# Patient Record
Sex: FEMALE | Race: WHITE | NOT HISPANIC OR LATINO | Employment: PART TIME | URBAN - METROPOLITAN AREA
[De-identification: names, ages, dates, MRNs, and addresses within clinical notes are randomized per-mention and may not be internally consistent; named-entity substitution may affect disease eponyms.]

---

## 2017-02-22 ENCOUNTER — NON-PROVIDER VISIT (OUTPATIENT)
Dept: URGENT CARE | Facility: PHYSICIAN GROUP | Age: 77
End: 2017-02-22

## 2017-02-22 DIAGNOSIS — Z11.1 PPD SCREENING TEST: ICD-10-CM

## 2017-02-22 PROCEDURE — 86580 TB INTRADERMAL TEST: CPT | Performed by: FAMILY MEDICINE

## 2017-02-22 NOTE — NON-PROVIDER
Elle Barksdale is a 77 y.o. female here for a non-provider visit for PPD placement -- Step 1 of 2    Reason for PPD:  work requirement    TB evaluation questionnaire completed? Yes   If any answers marked yes did you contact a provider prior to placing? Not Indicated  Pt notified to return to clinic for reading on: 2/24/17 or 2/25/17  Tuberculosis evaluation questionnaire documentation completed? Yes  LocationTuberculosis evaluation questionnaire filed: yes

## 2017-02-22 NOTE — MR AVS SNAPSHOT
Elle Barksdale   2017 1:30 PM   Non-Provider Visit   MRN: [id number]    Department:  [facility]   Dept Phone:  [phone]    Description:  Female : 1940   Provider:  [facility]           Reason for Visit     PPD Placement PPd placement      Allergies as of 2017     No Known Allergies      You were diagnosed with     PPD screening test   [[id number]]         Vital Signs     Smoking Status                   Former Smoker           Basic Information     Date Of Birth Sex Race Ethnicity Preferred Language    1940 Female White Non- English      Problem List              ICD-10-CM Priority Class Noted - Resolved    Right knee injury S89.91XA   2013 - Present    Family history of colon cancer Z80.0   2013 - Present    Routine adult health maintenance Z00.00   2013 - Present      Health Maintenance        Date Due Completion Dates    IMM DTaP/Tdap/Td Vaccine (1 - Tdap) 1959 ---    PAP SMEAR 1961 ---    MAMMOGRAM 1980 ---    COLONOSCOPY 1990 ---    IMM ZOSTER VACCINE 2000 ---    BONE DENSITY 2005 ---    IMM PNEUMOCOCCAL 65+ (ADULT) LOW/MEDIUM RISK SERIES (1 of 2 - PCV13) 2005 ---    IMM INFLUENZA (1) 2016 ---            Current Immunizations     Tuberculin Skin Test 2017      Below and/or attached are the medications your provider expects you to take. Review all of your home medications and newly ordered medications with your provider and/or pharmacist. Follow medication instructions as directed by your provider and/or pharmacist. Please keep your medication list with you and share with your provider. Update the information when medications are discontinued, doses are changed, or new medications (including over-the-counter products) are added; and carry medication information at all times in the event of emergency situations     Allergies:  No Known Allergies          Medications  Valid as of: 2017  -  2:33 PM    Generic Name Brand Name Tablet Size Instructions for use    .                 Medicines prescribed today were sent to:     None      Medication refill instructions:       If your prescription bottle indicates you have medication refills left, it is not necessary to call your provider’s office. Please contact your pharmacy and they will refill your medication.    If your prescription bottle indicates you do not have any refills left, you may request refills at any time through one of the following ways: The online [employer] system (except Urgent Care), by calling your provider’s office, or by asking your pharmacy to contact your provider’s office with a refill request. Medication refills are processed only during regular business hours and may not be available until the next business day. Your provider may request additional information or to have a follow-up visit with you prior to refilling your medication.   *Please Note: Medication refills are assigned a new Rx number when refilled electronically. Your pharmacy may indicate that no refills were authorized even though a new prescription for the same medication is available at the pharmacy. Please request the medicine by name with the pharmacy before contacting your provider for a refill.           [employer] Status: Patient Declined

## 2017-02-24 ENCOUNTER — NON-PROVIDER VISIT (OUTPATIENT)
Dept: URGENT CARE | Facility: PHYSICIAN GROUP | Age: 77
End: 2017-02-24

## 2017-02-24 LAB — TB WHEAL 3D P 5 TU DIAM: NORMAL MM

## 2017-02-24 NOTE — MR AVS SNAPSHOT
Elle Barksdale   2017 2:00 PM   Non-Provider Visit   MRN: [id number]    Department:  [facility]   Dept Phone:  [phone]    Description:  Female : 1940   Provider:  [facility]           Reason for Visit     PPD Reading           Allergies as of 2017     No Known Allergies      Vital Signs     Smoking Status                   Former Smoker           Basic Information     Date Of Birth Sex Race Ethnicity Preferred Language    1940 Female White Non- English      Problem List              ICD-10-CM Priority Class Noted - Resolved    Right knee injury S89.91XA   2013 - Present    Family history of colon cancer Z80.0   2013 - Present    Routine adult health maintenance Z00.00   2013 - Present      Health Maintenance        Date Due Completion Dates    IMM DTaP/Tdap/Td Vaccine (1 - Tdap) 1959 ---    PAP SMEAR 1961 ---    MAMMOGRAM 1980 ---    COLONOSCOPY 1990 ---    IMM ZOSTER VACCINE 2000 ---    BONE DENSITY 2005 ---    IMM PNEUMOCOCCAL 65+ (ADULT) LOW/MEDIUM RISK SERIES (1 of 2 - PCV13) 2005 ---    IMM INFLUENZA (1) 2016 ---            Current Immunizations     Tuberculin Skin Test 2017      Below and/or attached are the medications your provider expects you to take. Review all of your home medications and newly ordered medications with your provider and/or pharmacist. Follow medication instructions as directed by your provider and/or pharmacist. Please keep your medication list with you and share with your provider. Update the information when medications are discontinued, doses are changed, or new medications (including over-the-counter products) are added; and carry medication information at all times in the event of emergency situations     Allergies:  No Known Allergies          Medications  Valid as of: 2017 -  2:14 PM    Generic Name Brand Name Tablet Size Instructions for use    .                  Medicines prescribed today were sent to:     None      Medication refill instructions:       If your prescription bottle indicates you have medication refills left, it is not necessary to call your provider’s office. Please contact your pharmacy and they will refill your medication.    If your prescription bottle indicates you do not have any refills left, you may request refills at any time through one of the following ways: The online [employer] system (except Urgent Care), by calling your provider’s office, or by asking your pharmacy to contact your provider’s office with a refill request. Medication refills are processed only during regular business hours and may not be available until the next business day. Your provider may request additional information or to have a follow-up visit with you prior to refilling your medication.   *Please Note: Medication refills are assigned a new Rx number when refilled electronically. Your pharmacy may indicate that no refills were authorized even though a new prescription for the same medication is available at the pharmacy. Please request the medicine by name with the pharmacy before contacting your provider for a refill.           MyChart Status: Patient Declined

## 2017-02-24 NOTE — PROGRESS NOTES
Elle Barksdale is a 77 y.o. female here for a non-provider visit for PPD reading -- Step 1 of 2.      Resulted in Epic under original non-provider visit? Yes   TB evaluation questionnaire scanned into chart and original given to pt?No, patient needs two step. Copy given to patient, original packet up front in folder.   If greater than 0 mm, result verified by  (indicate provider who verified) NA    If Step 1 of 2, when is pt returning for second step (delete if N/A): WED 3/1/17    Routed to PCP? No

## 2017-03-01 ENCOUNTER — NON-PROVIDER VISIT (OUTPATIENT)
Dept: URGENT CARE | Facility: PHYSICIAN GROUP | Age: 77
End: 2017-03-01

## 2017-03-01 DIAGNOSIS — Z11.1 PPD SCREENING TEST: ICD-10-CM

## 2017-03-01 PROCEDURE — 86580 TB INTRADERMAL TEST: CPT | Performed by: FAMILY MEDICINE

## 2017-03-04 ENCOUNTER — APPOINTMENT (OUTPATIENT)
Dept: URGENT CARE | Facility: PHYSICIAN GROUP | Age: 77
End: 2017-03-04
Payer: MEDICARE

## 2017-10-26 ENCOUNTER — OFFICE VISIT (OUTPATIENT)
Dept: MEDICAL GROUP | Facility: PHYSICIAN GROUP | Age: 77
End: 2017-10-26
Payer: MEDICARE

## 2017-10-26 ENCOUNTER — HOSPITAL ENCOUNTER (OUTPATIENT)
Dept: RADIOLOGY | Facility: MEDICAL CENTER | Age: 77
End: 2017-10-26
Attending: INTERNAL MEDICINE
Payer: MEDICARE

## 2017-10-26 VITALS
TEMPERATURE: 98.6 F | OXYGEN SATURATION: 97 % | BODY MASS INDEX: 29.77 KG/M2 | SYSTOLIC BLOOD PRESSURE: 114 MMHG | RESPIRATION RATE: 12 BRPM | DIASTOLIC BLOOD PRESSURE: 80 MMHG | HEIGHT: 63 IN | WEIGHT: 168 LBS | HEART RATE: 80 BPM

## 2017-10-26 DIAGNOSIS — M25.562 ACUTE PAIN OF LEFT KNEE: ICD-10-CM

## 2017-10-26 DIAGNOSIS — Z00.00 HEALTHCARE MAINTENANCE: ICD-10-CM

## 2017-10-26 DIAGNOSIS — Z23 NEED FOR VACCINATION WITH 13-POLYVALENT PNEUMOCOCCAL CONJUGATE VACCINE: ICD-10-CM

## 2017-10-26 DIAGNOSIS — M79.89 LEG SWELLING: ICD-10-CM

## 2017-10-26 PROBLEM — M25.569 KNEE PAIN: Status: ACTIVE | Noted: 2017-10-26

## 2017-10-26 PROBLEM — M25.569 KNEE PAIN: Status: RESOLVED | Noted: 2017-10-26 | Resolved: 2017-10-26

## 2017-10-26 PROCEDURE — 73562 X-RAY EXAM OF KNEE 3: CPT | Mod: LT

## 2017-10-26 PROCEDURE — 99204 OFFICE O/P NEW MOD 45 MIN: CPT | Performed by: INTERNAL MEDICINE

## 2017-10-26 PROCEDURE — 90670 PCV13 VACCINE IM: CPT | Performed by: INTERNAL MEDICINE

## 2017-10-26 PROCEDURE — G0009 ADMIN PNEUMOCOCCAL VACCINE: HCPCS | Performed by: INTERNAL MEDICINE

## 2017-10-26 ASSESSMENT — PATIENT HEALTH QUESTIONNAIRE - PHQ9: CLINICAL INTERPRETATION OF PHQ2 SCORE: 0

## 2017-10-26 NOTE — ASSESSMENT & PLAN NOTE
Has been having intermittent left knee pain for the past several months increasing in frequency. Pain is a burning sensation and improves with advil. Climbing stairs make it harder. Denies trauma. Denies tingling/numbness of left lower extremity. Exacerbated by sitting a lot, like plane travel. Likes to travel. Didn't have as much trouble last year on her trip to [place] and with walking but wouldn't be able to attempt such a trip this year.

## 2017-10-26 NOTE — ASSESSMENT & PLAN NOTE
Has bilateral pedal edema in the summers usually but this year it's persisting. Usually elevating her feet helps but that isn't helping as much anymore. In the evenings she will sit on the couch and rest her feet on an ottoman or couch. Notes leg swelling even in the mornings now. Denies chest pain, shortness of breath, palpitations, frothy urine

## 2017-10-26 NOTE — PROGRESS NOTES
PRIMARY CARE CLINIC NEW PATIENT H&P  Chief Complaint   Patient presents with   • Knee Pain     L knee xmonths   • Leg Swelling     with ankle swelling      History of Present Illness     Acute pain of left knee  Has been having intermittent left knee pain for the past several months increasing in frequency. Pain is a burning sensation and improves with advil. Climbing stairs make it harder. Denies trauma. Denies tingling/numbness of left lower extremity. Exacerbated by sitting a lot, like plane travel. Likes to travel. Didn't have as much trouble last year on her trip to [place] and with walking but wouldn't be able to attempt such a trip this year.     Leg swelling  Has bilateral pedal edema in the summers usually but this year it's persisting. Usually elevating her feet helps but that isn't helping as much anymore. In the evenings she will sit on the couch and rest her feet on an ottoman or couch. Notes leg swelling even in the mornings now. Denies chest pain, shortness of breath, palpitations, frothy urine    No current outpatient prescriptions on file.     No current facility-administered medications for this visit.        Past Medical History:   Diagnosis Date   • Acute pain of left knee 10/26/2017   • Leg swelling 10/26/2017   • Right knee injury 4/4/2013     Past Surgical History:   Procedure Laterality Date   • CATARACT PHACO WITH IOL  1/13/2014    Performed by Samuel Vega M.D. at [facility]   • CATARACT PHACO WITH IOL  12/16/2013    Performed by Samuel Vega M.D. at [facility]   • APPENDECTOMY     • TONSILLECTOMY       Social History   Substance Use Topics   • Smoking status: Former Smoker     Packs/day: 1.00     Years: 30.00     Types: Cigarettes     Quit date: 1990   • Smokeless tobacco: Never Used   • Alcohol use Yes      Comment: rarely; 2 glasses of wine/month      Social History     Social History Narrative    Caregiver for clients at [employer]  "History   Problem Relation Age of Onset   • Hypertension Mother    • Cancer Father      colon   • No Known Problems Brother    • Diabetes Neg Hx    • Heart Disease Neg Hx    • Stroke Neg Hx      Family Status   Relation Status   • Mother    • Father    • Brother Alive, age 76y   • Neg Hx      Allergies: Review of patient's allergies indicates no known allergies.    ROS  Constitutional: Negative for fatigue/generalized weakness.   HEENT: Negative for  vision changes, hearing changes    Respiratory: Negative for shortness of breath  Cardiovascular: Negative for chest pain, palpitations  Gastrointestinal: Negative for blood in stool, constipation, diarrhea  Genitourinary: Negative for dysuria, polyuria  Musculoskeletal: positive for left knee pain and bilateral ankle edema   Skin: Negative for rash  Neurological: Negative for numbness, tingling  Psychiatric/Behavioral: Negative for depression, suicidal/homicidal ideation      Objective   Blood pressure 114/80, pulse 80, temperature 37 °C (98.6 °F), resp. rate 12, height 1.6 m (5' 3\"), weight 76.2 kg (168 lb), SpO2 97 %. Body mass index is 29.76 kg/m².    General: Alert, oriented. In no acute distress   HEET: EOMI, PERRL, conjunctiva non-injected, sclera non-icteric.  Nares patent with no significant congestion or drainage.  Cynthia pinnae, external auditory canals, TM pearly gray with normal light reflex bilaterally.Oral mucous membranes pink and moist with no lesions.  Neck: supple with no cervical, subclavicular lymphadenopathy, JVD, palpable thyroid nodules   Lungs: clear to auscultation bilaterally with good excursion.  CV: regular rate and rhythm.  Abdomen soft, non-distended, non-tender with normal bowel sounds. No hepatosplenomegaly, no masses palpated  Skin: no lesions. Warm, dry   Psychiatric: appropriate mood and affect   MSK: crepitus of bilateral knees. Mild swelling of lateral aspect of left knee. Left knee with full extension and flexion. "   Extremities: bilateral non-pitting ankle edema    Assessment and Plan   The following treatment plan was discussed     1. Acute pain of left knee  Most likely OA given symptoms worse with ambulation, improve with NSAIDs. Will get imaging and then most likely plan for possible cortisone injection to avoid systemic side effects of NSAIDs. Also provided a handout on quadriceps strengthening exercises.   - DX-KNEE 3 VIEWS LEFT; Future    2. Leg swelling  Unlike cardiac or renal etiology given negative ROS however will check basic labs. Recommended she elevate legs above level of heart especially while sleeping. Hopefully that will reduce edema enough for her to be able to fit compression stockings on during the day while she's working.     3. Need for vaccination with 13-polyvalent pneumococcal conjugate vaccine  Given today, plan for Pneumovax in 12 months.     4. Healthcare maintenance  Obtain fasting labs. Will call patient with results.   - COMP METABOLIC PANEL; Future  - CBC WITH DIFFERENTIAL; Future  - VITAMIN D,25 HYDROXY; Future  - LIPID PROFILE; Future      Return in about 6 months (around 4/26/2018) for annual wellness .    Health Maintenance      Health Maintenance Due   Topic Date Due   • Annual Wellness Visit  1940   • IMM DTaP/Tdap/Td Vaccine (1 - Tdap) 01/19/1959   • PAP SMEAR  01/19/1961   • MAMMOGRAM  01/19/1980   • IMM ZOSTER VACCINE  01/19/2000   • IMM PNEUMOCOCCAL 65+ (ADULT) LOW/MEDIUM RISK SERIES (1 of 2 - PCV13) 01/19/2005       Laron Gaming MD  Internal Medicine  [facility]

## 2017-10-30 ENCOUNTER — HOSPITAL ENCOUNTER (OUTPATIENT)
Dept: LAB | Facility: MEDICAL CENTER | Age: 77
End: 2017-10-30
Attending: INTERNAL MEDICINE
Payer: MEDICARE

## 2017-10-30 DIAGNOSIS — Z00.00 HEALTHCARE MAINTENANCE: ICD-10-CM

## 2017-10-30 LAB
25(OH)D3 SERPL-MCNC: 14 NG/ML (ref 30–100)
ALBUMIN SERPL BCP-MCNC: 3.8 G/DL (ref 3.2–4.9)
ALBUMIN/GLOB SERPL: 1.5 G/DL
ALP SERPL-CCNC: 61 U/L (ref 30–99)
ALT SERPL-CCNC: 9 U/L (ref 2–50)
ANION GAP SERPL CALC-SCNC: 6 MMOL/L (ref 0–11.9)
AST SERPL-CCNC: 17 U/L (ref 12–45)
BASOPHILS # BLD AUTO: 0.9 % (ref 0–1.8)
BASOPHILS # BLD: 0.05 K/UL (ref 0–0.12)
BILIRUB SERPL-MCNC: 0.8 MG/DL (ref 0.1–1.5)
BUN SERPL-MCNC: 21 MG/DL (ref 8–22)
CALCIUM SERPL-MCNC: 9.7 MG/DL (ref 8.5–10.5)
CHLORIDE SERPL-SCNC: 107 MMOL/L (ref 96–112)
CHOLEST SERPL-MCNC: 202 MG/DL (ref 100–199)
CO2 SERPL-SCNC: 27 MMOL/L (ref 20–33)
CREAT SERPL-MCNC: 0.57 MG/DL (ref 0.5–1.4)
EOSINOPHIL # BLD AUTO: 0.33 K/UL (ref 0–0.51)
EOSINOPHIL NFR BLD: 6.1 % (ref 0–6.9)
ERYTHROCYTE [DISTWIDTH] IN BLOOD BY AUTOMATED COUNT: 41.8 FL (ref 35.9–50)
GFR SERPL CREATININE-BSD FRML MDRD: >60 ML/MIN/1.73 M 2
GLOBULIN SER CALC-MCNC: 2.6 G/DL (ref 1.9–3.5)
GLUCOSE SERPL-MCNC: 97 MG/DL (ref 65–99)
HCT VFR BLD AUTO: 41.8 % (ref 37–47)
HDLC SERPL-MCNC: 46 MG/DL
HGB BLD-MCNC: 13.9 G/DL (ref 12–16)
IMM GRANULOCYTES # BLD AUTO: 0.04 K/UL (ref 0–0.11)
IMM GRANULOCYTES NFR BLD AUTO: 0.7 % (ref 0–0.9)
LDLC SERPL CALC-MCNC: 128 MG/DL
LYMPHOCYTES # BLD AUTO: 1.65 K/UL (ref 1–4.8)
LYMPHOCYTES NFR BLD: 30.6 % (ref 22–41)
MCH RBC QN AUTO: 30.7 PG (ref 27–33)
MCHC RBC AUTO-ENTMCNC: 33.3 G/DL (ref 33.6–35)
MCV RBC AUTO: 92.3 FL (ref 81.4–97.8)
MONOCYTES # BLD AUTO: 0.35 K/UL (ref 0–0.85)
MONOCYTES NFR BLD AUTO: 6.5 % (ref 0–13.4)
NEUTROPHILS # BLD AUTO: 2.98 K/UL (ref 2–7.15)
NEUTROPHILS NFR BLD: 55.2 % (ref 44–72)
NRBC # BLD AUTO: 0 K/UL
NRBC BLD AUTO-RTO: 0 /100 WBC
PLATELET # BLD AUTO: 161 K/UL (ref 164–446)
PMV BLD AUTO: 12.7 FL (ref 9–12.9)
POTASSIUM SERPL-SCNC: 4.8 MMOL/L (ref 3.6–5.5)
PROT SERPL-MCNC: 6.4 G/DL (ref 6–8.2)
RBC # BLD AUTO: 4.53 M/UL (ref 4.2–5.4)
SODIUM SERPL-SCNC: 140 MMOL/L (ref 135–145)
TRIGL SERPL-MCNC: 141 MG/DL (ref 0–149)
WBC # BLD AUTO: 5.4 K/UL (ref 4.8–10.8)

## 2017-10-30 PROCEDURE — 85025 COMPLETE CBC W/AUTO DIFF WBC: CPT

## 2017-10-30 PROCEDURE — 82306 VITAMIN D 25 HYDROXY: CPT

## 2017-10-30 PROCEDURE — 80061 LIPID PANEL: CPT

## 2017-10-30 PROCEDURE — 80053 COMPREHEN METABOLIC PANEL: CPT

## 2017-10-30 PROCEDURE — 36415 COLL VENOUS BLD VENIPUNCTURE: CPT

## 2017-10-31 ENCOUNTER — TELEPHONE (OUTPATIENT)
Dept: MEDICAL GROUP | Facility: PHYSICIAN GROUP | Age: 77
End: 2017-10-31

## 2017-10-31 DIAGNOSIS — M25.562 ACUTE PAIN OF LEFT KNEE: ICD-10-CM

## 2017-10-31 NOTE — TELEPHONE ENCOUNTER
----- Message from Laron Gaming M.D. sent at 10/31/2017  8:15 AM PDT -----  Please let Elle know that her vitamin D is low. She needs to start taking 5000 units of vitamin D3 for at least 3 months and then 1000 - 2000 units daily after that.     Thanks,   Laron

## 2017-10-31 NOTE — TELEPHONE ENCOUNTER
----- Message from Laron Gaming M.D. sent at 10/31/2017  7:58 AM PDT -----  Please let Elle know that her knee x-ray shows arthritis. She has been given a sports medicine referral for further therapy, which may include cortisone injection at their discretion.

## 2017-11-06 ENCOUNTER — OFFICE VISIT (OUTPATIENT)
Dept: MEDICAL GROUP | Facility: CLINIC | Age: 77
End: 2017-11-06
Payer: MEDICARE

## 2017-11-06 ENCOUNTER — APPOINTMENT (OUTPATIENT)
Dept: RADIOLOGY | Facility: IMAGING CENTER | Age: 77
End: 2017-11-06
Attending: FAMILY MEDICINE
Payer: MEDICARE

## 2017-11-06 VITALS
TEMPERATURE: 97.8 F | WEIGHT: 168 LBS | DIASTOLIC BLOOD PRESSURE: 80 MMHG | HEART RATE: 68 BPM | HEIGHT: 63 IN | SYSTOLIC BLOOD PRESSURE: 122 MMHG | OXYGEN SATURATION: 95 % | BODY MASS INDEX: 29.77 KG/M2 | RESPIRATION RATE: 16 BRPM

## 2017-11-06 DIAGNOSIS — M17.12 LOCALIZED OSTEOARTHRITIS OF LEFT KNEE: ICD-10-CM

## 2017-11-06 DIAGNOSIS — M25.552 LEFT HIP PAIN: ICD-10-CM

## 2017-11-06 DIAGNOSIS — M16.12 PRIMARY LOCALIZED OSTEOARTHROSIS OF THE HIP, LEFT: ICD-10-CM

## 2017-11-06 DIAGNOSIS — M76.32 ILIOTIBIAL BAND SYNDROME AFFECTING LEFT LOWER LEG: ICD-10-CM

## 2017-11-06 PROCEDURE — 73502 X-RAY EXAM HIP UNI 2-3 VIEWS: CPT | Mod: TC,LT | Performed by: FAMILY MEDICINE

## 2017-11-06 PROCEDURE — 99203 OFFICE O/P NEW LOW 30 MIN: CPT | Performed by: FAMILY MEDICINE

## 2017-11-06 PROCEDURE — 72170 X-RAY EXAM OF PELVIS: CPT | Mod: TC | Performed by: FAMILY MEDICINE

## 2017-11-06 NOTE — PROGRESS NOTES
"CHIEF COMPLAINT:  Elle Barksdale female presenting at the request of Laron Gaming M.D.  for evaluation of knee pain.     Elle Barksdale is complaining of LEFT knee pain  Began about a year ago without incident after a trip to [place]  Worse for the past 6 months  Pain is at the anterolateral knee  Quality is burning  Pain is radiating to occasional pain along the lateral hip in the distribution of the ITB   Improved with resting, and elevating the leg  Aggravated by stairs, sitting for long periods and getting up after  no prior problems with this area in the past   Prior Treatments: NONE  Prior studies: X-Ray   Medications tried for pain include: ibuprofen (OTC) which is helping  Mechanical Symptom history: No Locking    REVIEW OF SYSTEMS  No Nausea, No Vomiting, No Chest Pain, No Shortness of Breath, No Dizziness, No Headache      PAST MEDICAL HISTORY:   History reviewed. No pertinent past medical history.    PMH:  has a past medical history of Acute pain of left knee (10/26/2017); Leg swelling (10/26/2017); and Right knee injury (4/4/2013).  MEDS: No current outpatient prescriptions on file.  ALLERGIES: No Known Allergies  SURGHX:   Past Surgical History:   Procedure Laterality Date   • CATARACT PHACO WITH IOL  1/13/2014    Performed by Samuel Vega M.D. at [facility]   • CATARACT PHACO WITH IOL  12/16/2013    Performed by Samuel Vega M.D. at [facility]   • APPENDECTOMY     • TONSILLECTOMY       SOCHX:  reports that she quit smoking about 27 years ago. Her smoking use included Cigarettes. She has a 30.00 pack-year smoking history. She has never used smokeless tobacco. She reports that she drinks alcohol. She reports that she does not use drugs.  FH: Family history was reviewed, no pertinent findings to report     PHYSICAL EXAM:  /80   Pulse 68   Temp 36.6 °C (97.8 °F)   Resp 16   Ht 1.6 m (5' 3\")   Wt 76.2 kg (168 lb)   SpO2 95%   BMI 29.76 kg/m²       " slightly overweight in no apparent distress, alert and oriented x 3.  Gait: antalgic     RIGHT Knee:  Slight Varus and No Swelling  Range of Motion Intact  Trace effusion  Patellar No tenderness and no apprehension  Medial Joint Line Non-tender and NEGATIVE Dionna  Lateral Joint Line Non-tender and NEGATIVE Dionna  Trace Laxity with Varus stress  Trace Laxity with Valgus stress  Lachman's testing is Trace  Posterior Drawer Testing is Trace  The leg is otherwise neurovascularly intact    LEFT Knee:  Slight Varus and No Swelling   Range of Motion Intact  Trace effusion  Patellar Medial facet tenderness and Extensor mechanism intact  Medial Joint Line Non-tender and NEGATIVE Dionna  Lateral Joint Line Non-tender and NEGATIVE Dionna  Trace Laxity with Varus stress  Trace Laxity with Valgus stress  Lachman's testing is Trace  Posterior Drawer Testing is Trace  The leg is otherwise neurovascularly intact    HIP EXAM:    Right hip: Range of motion is slightly limited with internal rotation  NEGATIVE pain with internal rotation  jaycob's test is NEGATIVE    Left hip: Range of motion is markedly limited with passive internal rotation  POSITIVE pain with passive internal rotation  jaycob's test is NEGATIVE    Additional Findings: None      1. Primary localized osteoarthrosis of the hip, left  REFERRAL TO PHYSICAL THERAPY Reason for Therapy: Eval/Treat/Report   2. Localized osteoarthritis of left knee  REFERRAL TO PHYSICAL THERAPY Reason for Therapy: Eval/Treat/Report   3. Iliotibial band syndrome affecting left lower leg  REFERRAL TO PHYSICAL THERAPY Reason for Therapy: Eval/Treat/Report   4. Left hip pain  DX-PELVIS-1 OR 2 VIEWS    DX-HIP-COMPLETE - UNILATERAL 2+ LEFT     LEFT hip severe osteoarthritis  LEFT iliotibial band friction syndrome, distal and proximal  Discussed options of treatment of her left hip osteoarthritis  Patient not interested in corticosteroid injection at this time  Referred for formal physical  therapy    Return in about 4 weeks (around 12/4/2017).    11/6/2017 4:33 PM    HISTORY/REASON FOR EXAM:  Atraumatic Pelvic/Hip Pain.  Pain from LEFT groin to knee    TECHNIQUE/EXAM DESCRIPTION AND NUMBER OF VIEWS:  2 views of the LEFT hip.    COMPARISON: None    FINDINGS:  Degenerative change of lower lumbar spine.  Pelvis and sacrum are intact.  Loss of joint space and osteophytes aeration of both hips, more extensive on the LEFT.  Probable subchondral cyst formation in the LEFT femoral head.  LEFT proximal femur is intact and normally located.  No gross soft tissue abnormality.   Impression       1.  No LEFT hip or pelvic fracture.  2.  Severe LEFT and moderate RIGHT hip degenerative changes.     11/6/2017 4:16 PM    HISTORY/REASON FOR EXAM:  Atraumatic Pelvic/Hip Pain.  Left groin and knee pain    TECHNIQUE/EXAM DESCRIPTION AND NUMBER OF VIEWS:  1 view(s) of the pelvis.    COMPARISON:  None.    FINDINGS:  The bony pelvis is intact.    There is severe left hip osteoarthritis.    There is moderate to severe right hip osteoarthritis.    There are no fracture or malalignment.    There is bilateral sacroiliac joint osteoarthritis    There is facet arthropathy of the lower lumbar spine.    The visualized portions of the abdomen are within normal limits.   Impression       1.  Severe left and moderate-severe right hip joint osteoarthritis    2.  Also, osteoarthritis of both sacroiliac joints and multiple lumbar spine facet joint     taken here and reviewed by me    Thank you Laron Gaming M.D. for allowing me to participate in caring for your patient.

## 2017-12-19 ENCOUNTER — PATIENT OUTREACH (OUTPATIENT)
Dept: HEALTH INFORMATION MANAGEMENT | Facility: OTHER | Age: 77
End: 2017-12-19

## 2017-12-19 NOTE — PROGRESS NOTES
Elle called and declined AWV, because she saw her new PCP recently. Pt agree to be contacted next yr to schedule the W[place].  No more info provided.

## 2018-02-14 ENCOUNTER — NON-PROVIDER VISIT (OUTPATIENT)
Dept: URGENT CARE | Facility: PHYSICIAN GROUP | Age: 78
End: 2018-02-14

## 2018-02-14 DIAGNOSIS — Z11.1 PPD SCREENING TEST: ICD-10-CM

## 2018-02-14 PROCEDURE — 86580 TB INTRADERMAL TEST: CPT | Performed by: FAMILY MEDICINE

## 2018-02-15 NOTE — NON-PROVIDER
Elle Barksdale is a 78 y.o. female here for a non-provider visit for PPD placement -- Step 1 of 1    Reason for PPD:  work requirement    1. TB evaluation questionnaire completed by patient? Yes      -  If any answers marked yes did you contact a provider prior to placing?Not indicated  2.  Patient notified to return to clinic for reading on: 2/16 after 1306 or 2/17 before 1306  3.  PPD Placement documentation completed on TB evaluation questionnaire? Yes  4.  Location of TB evaluation questionnaire filed:  file

## 2018-02-16 ENCOUNTER — NON-PROVIDER VISIT (OUTPATIENT)
Dept: URGENT CARE | Facility: PHYSICIAN GROUP | Age: 78
End: 2018-02-16
Payer: MEDICARE

## 2018-02-16 LAB — TB WHEAL 3D P 5 TU DIAM: NORMAL MM

## 2018-02-16 NOTE — NON-PROVIDER
Elle Barksdale is a 78 y.o. female here for a non-provider visit for PPD reading -- Step 1 of 1.      1.  Resulted in Epic under enter/edit results? Yes   2.  TB evaluation questionnaire scanned into chart and original given to patient?Yes      3. Was induration greater than 0 mm? No.      Routed to PCP? No

## 2018-04-20 NOTE — PROGRESS NOTES
Outcome: Left Message    Please transfer to Patient Outreach Team at [phone] when patient returns call.    WebIZ Checked & Epic Updated:  no    HealthConnect Verified: no    Attempt # FINAL/DECLINED LIST

## 2018-05-30 NOTE — PROGRESS NOTES
1. Attempt #: 1    2. HealthConnect Verified: yes    3. Verify PCP: yes    4. Care Team Updated:       •   DME Company (gait device, O2, CPAP, etc.): YES       •   Other Specialists (eye doctor, derm, GYN, cardiology, endo, etc): YES    5.  Reviewed/Updated the following with patient:       •   Communication Preference Obtained? YES       •   Preferred Pharmacy? YES       •   Preferred Lab? YES       •   Family History (document living status of immediate family members and if + hx of cancer, diabetes, hypertension, hyperlipidemia, heart attack, stroke) YES. Was Abstract Encounter opened and chart updated? YES    6. [employer] Activation: declined    7. [employer] Surya: no    8. Annual Wellness Visit Scheduling  Scheduling Status:Scheduled      9. Care Gap Scheduling (Attempt to Schedule EACH Overdue Care Gap!)     There are no preventive care reminders to display for this patient.     Scheduled patient for Annual Wellness Visit    10. Patient was advised: “This is a free wellness visit. The provider will screen for medical conditions to help you stay healthy. If you have other concerns to address you may be asked to discuss these at a separate visit or there may be an additional fee.”     11. Patient was informed to arrive 15 min prior to their scheduled appointment and bring in their medication bottles.

## 2018-06-05 ENCOUNTER — TELEPHONE (OUTPATIENT)
Dept: MEDICAL GROUP | Facility: PHYSICIAN GROUP | Age: 78
End: 2018-06-05

## 2018-06-05 NOTE — TELEPHONE ENCOUNTER
Future Appointments       Provider Department Center    6/11/2018 3:15 PM Laron Gaming M.D.; [facility]        ANNUAL WELLNESS VISIT PRE-VISIT PLANNING WITH OUTREACH    1.  Immunizations were updated in Epic using WebIZ?:Yes       •  WebIZ Recommendations: FLU, TDAP and SHINGRIX (Shingles)       •  Is patient due for Tdap? YES. Patient was notified of copay/out of pocket cost.       •  Is patient due for Shingles?YES. Patient was notified of copay/out of pocket cost.    2.  MDX printed for Provider? YES     3.  Reviewed note from last office visit with PCP: YES 10/26/2017    4.  If any orders were placed at last visit, do we have Results/Consult Notes?        •  Labs - Labs ordered, completed on 10/30/2017 and results are in chart.       •  Imaging - Imaging ordered, completed and results are in chart.       •  Referrals - Referral ordered, patient was seen and consult notes are in chart. Care Teams updated  YES.      5.  Patient is due for the following Health Maintenance Topics:   There are no preventive care reminders to display for this patient.      7.  Patient has the following Care Path diagnoses on Problem List:  NONE

## 2018-06-11 ENCOUNTER — OFFICE VISIT (OUTPATIENT)
Dept: MEDICAL GROUP | Facility: PHYSICIAN GROUP | Age: 78
End: 2018-06-11
Payer: MEDICARE

## 2018-06-11 VITALS
RESPIRATION RATE: 16 BRPM | HEART RATE: 75 BPM | SYSTOLIC BLOOD PRESSURE: 132 MMHG | DIASTOLIC BLOOD PRESSURE: 88 MMHG | TEMPERATURE: 97.9 F | BODY MASS INDEX: 29.77 KG/M2 | OXYGEN SATURATION: 95 % | HEIGHT: 63 IN | WEIGHT: 168 LBS

## 2018-06-11 DIAGNOSIS — M19.90 OSTEOARTHRITIS, UNSPECIFIED OSTEOARTHRITIS TYPE, UNSPECIFIED SITE: ICD-10-CM

## 2018-06-11 PROCEDURE — G0439 PPPS, SUBSEQ VISIT: HCPCS | Performed by: INTERNAL MEDICINE

## 2018-06-11 RX ORDER — CHOLECALCIFEROL (VITAMIN D3) 125 MCG
CAPSULE ORAL
COMMUNITY
End: 2020-01-01

## 2018-06-11 ASSESSMENT — ACTIVITIES OF DAILY LIVING (ADL): BATHING_REQUIRES_ASSISTANCE: 0

## 2018-06-11 ASSESSMENT — PATIENT HEALTH QUESTIONNAIRE - PHQ9: CLINICAL INTERPRETATION OF PHQ2 SCORE: 0

## 2018-06-11 ASSESSMENT — ENCOUNTER SYMPTOMS: GENERAL WELL-BEING: GOOD

## 2018-06-11 NOTE — ASSESSMENT & PLAN NOTE
She completed 12 sessions of formal physical therapy. She is also keeping up with the home exercises but it hasn't helped much. Her chiropractor has helped more since he is manipulating the muscles which helps more.

## 2018-06-11 NOTE — PROGRESS NOTES
Chief Complaint   Patient presents with   • Annual Wellness Visit         HPI:  Elle is a 78 y.o. here for Medicare Annual Wellness Visit        Patient Active Problem List    Diagnosis Date Noted   • Osteoarthritis 06/11/2018       Current Outpatient Prescriptions   Medication Sig Dispense Refill   • Cholecalciferol (VITAMIN D3) 2000 units Tab Take  by mouth.       No current facility-administered medications for this visit.         Patient is taking medications as noted in medication list.  Current supplements as per medication list.     Allergies: Patient has no known allergies.    Current social contact/activities: Pt is currently working as a caregiver.       Is patient current with immunizations? Yes.    She  reports that she quit smoking about 28 years ago. Her smoking use included Cigarettes. She has a 30.00 pack-year smoking history. She has never used smokeless tobacco. She reports that she drinks alcohol. She reports that she does not use drugs.  Counseling given: Not Answered        DPA/Advanced directive: Patient does not have an Advanced Directive.  A packet and workshop information was given on Advanced Directives.    ROS:    Gait: Uses no assistive device   Ostomy: no   Other tubes: no   Amputations: no   Chronic oxygen use no   Last eye exam 2018   Wears hearing aids: no   : Denies any urinary leakage during the last 6 months      Screening:        Depression Screening    Little interest or pleasure in doing things?  0 - not at all  Feeling down, depressed, or hopeless? 0 - not at all  Patient Health Questionnaire Score: 0    If depressive symptoms identified deferred to follow up visit unless specifically addressed in assessment and plan.    Interpretation of PHQ-9 Total Score   Score Severity   1-4 No Depression   5-9 Mild Depression   10-14 Moderate Depression   15-19 Moderately Severe Depression   20-27 Severe Depression    Screening for Cognitive Impairment    Three Minute Recall (leader,  season, table)  3/3    Yariel clock face with all 12 numbers and set the hands to show 10 past 11.  Yes    If cognitive concerns identified, deferred for follow up unless specifically addressed in assessment and plan.    Fall Risk Assessment    Has the patient had two or more falls in the last year or any fall with injury in the last year?  No  If fall risk identified, deferred for follow up unless specifically addressed in assessment and plan.    Safety Assessment    Throw rugs on floor.  No  Handrails on all stairs.  Yes  Good lighting in all hallways.  Yes  Difficulty hearing.  No  Patient counseled about all safety risks that were identified.    Functional Assessment ADLs    Are there any barriers preventing you from cooking for yourself or meeting nutritional needs?  No.    Are there any barriers preventing you from driving safely or obtaining transportation?  No.    Are there any barriers preventing you from using a telephone or calling for help?  No.    Are there any barriers preventing you from shopping?  No.    Are there any barriers preventing you from taking care of your own finances?  No.    Are there any barriers preventing you from managing your medications?  No.    Are there any barriers preventing you from showering, bathing or dressing yourself?  No.    Are you currently engaging in any exercise or physical activity?   .  Working as a caregiver.   What is your perception of your health?  Good.    Health Maintenance Summary                MAMMOGRAM Postponed 10/26/2018 Originally 1/19/1980. Patient Refused    Annual Wellness Visit Postponed 10/26/2018 Originally 1940. Patient Refused    COLONOSCOPY Postponed 10/26/2018 Originally 1/19/1990. Patient Refused    IMM INFLUENZA Postponed 10/26/2018 Originally 9/1/2018. Patient Refused    IMM DTaP/Tdap/Td Vaccine Postponed 10/26/2018 Originally 1/19/1959. Patient Refused    IMM PNEUMOCOCCAL 65+ (ADULT) LOW/MEDIUM RISK SERIES Next Due 10/26/2018       "Done 10/26/2017 Imm Admin: Pneumococcal Conjugate Vaccine (Prevnar/PCV-13)    BONE DENSITY Next Due 10/26/2022      Done 10/26/2017           Patient Care Team:  Laron Gaming M.D. as PCP - General (Internal Medicine)    Social History   Substance Use Topics   • Smoking status: Former Smoker     Packs/day: 1.00     Years: 30.00     Types: Cigarettes     Quit date: 1990   • Smokeless tobacco: Never Used   • Alcohol use Yes      Comment: rarely; 2 glasses of wine/month      Family History   Problem Relation Age of Onset   • Hypertension Mother    • Cancer Father      colon   • No Known Problems Brother    • Diabetes Neg Hx    • Heart Disease Neg Hx    • Stroke Neg Hx      She  has a past medical history of Acute pain of left knee (10/26/2017); Leg swelling (10/26/2017); Osteoarthritis (6/11/2018); and Right knee injury (4/4/2013).   Past Surgical History:   Procedure Laterality Date   • CATARACT PHACO WITH IOL  1/13/2014    Performed by Samuel Vega M.D. at [facility]   • CATARACT PHACO WITH IOL  12/16/2013    Performed by Samuel Vega M.D. at [facility]   • APPENDECTOMY     • TONSILLECTOMY             Exam:     Blood pressure 132/88, pulse 75, temperature 36.6 °C (97.9 °F), resp. rate 16, height 1.6 m (5' 3\"), weight 76.2 kg (168 lb), SpO2 95 %. Body mass index is 29.76 kg/m².    Hearing good.    Dentition good  Alert, oriented in no acute distress.  Eye contact is good, speech goal directed, affect calm      Assessment and Plan. The following treatment and monitoring plan is recommended:    Osteoarthritis  She completed 12 sessions of formal physical therapy. She is also keeping up with the home exercises but it hasn't helped much. Her chiropractor has helped more since he is manipulating the muscles which helps more. Advised her to return to Dr. Lora for possible corticosteroid injection.     Services suggested: No services needed at this time  Health Care Screening " recommendations as per orders if indicated.  Referrals offered: PT/OT/Nutrition counseling/Behavioral Health/Smoking cessation as per orders if indicated.    Discussion today about general wellness and lifestyle habits:    · Prevent falls and reduce trip hazards; Cautioned about securing or removing rugs.  · Have a working fire alarm and carbon monoxide detector;   · Engage in regular physical activity and social activities       Follow-up: Return in about 1 year (around 6/11/2019).     Laron Gaming M.D.

## 2019-01-01 ENCOUNTER — OFFICE VISIT (OUTPATIENT)
Dept: MEDICAL GROUP | Facility: PHYSICIAN GROUP | Age: 79
End: 2019-01-01
Payer: MEDICARE

## 2019-01-01 ENCOUNTER — OFFICE VISIT (OUTPATIENT)
Dept: URGENT CARE | Facility: PHYSICIAN GROUP | Age: 79
End: 2019-01-01
Payer: MEDICARE

## 2019-01-01 ENCOUNTER — HOSPITAL ENCOUNTER (OUTPATIENT)
Dept: LAB | Facility: MEDICAL CENTER | Age: 79
End: 2019-12-23
Attending: NURSE PRACTITIONER
Payer: MEDICARE

## 2019-01-01 ENCOUNTER — HOSPITAL ENCOUNTER (OUTPATIENT)
Facility: MEDICAL CENTER | Age: 79
End: 2019-09-15
Attending: INTERNAL MEDICINE
Payer: MEDICARE

## 2019-01-01 VITALS
RESPIRATION RATE: 14 BRPM | OXYGEN SATURATION: 94 % | SYSTOLIC BLOOD PRESSURE: 142 MMHG | WEIGHT: 173 LBS | TEMPERATURE: 98.5 F | DIASTOLIC BLOOD PRESSURE: 84 MMHG | BODY MASS INDEX: 30.65 KG/M2 | HEART RATE: 76 BPM | HEIGHT: 63 IN

## 2019-01-01 VITALS
SYSTOLIC BLOOD PRESSURE: 144 MMHG | BODY MASS INDEX: 29.77 KG/M2 | HEART RATE: 72 BPM | TEMPERATURE: 98.2 F | DIASTOLIC BLOOD PRESSURE: 98 MMHG | WEIGHT: 168 LBS | RESPIRATION RATE: 12 BRPM | OXYGEN SATURATION: 95 % | HEIGHT: 63 IN

## 2019-01-01 DIAGNOSIS — T14.8XXA HEMATOMA: ICD-10-CM

## 2019-01-01 DIAGNOSIS — K62.5 BLOOD PER RECTUM: ICD-10-CM

## 2019-01-01 DIAGNOSIS — R19.5 POSITIVE FIT (FECAL IMMUNOCHEMICAL TEST): ICD-10-CM

## 2019-01-01 LAB
BASOPHILS # BLD AUTO: 0.8 % (ref 0–1.8)
BASOPHILS # BLD: 0.05 K/UL (ref 0–0.12)
EOSINOPHIL # BLD AUTO: 0.23 K/UL (ref 0–0.51)
EOSINOPHIL NFR BLD: 3.7 % (ref 0–6.9)
ERYTHROCYTE [DISTWIDTH] IN BLOOD BY AUTOMATED COUNT: 42.3 FL (ref 35.9–50)
HCT VFR BLD AUTO: 41.5 % (ref 37–47)
HEMOCCULT STL QL IA: POSITIVE
HGB BLD-MCNC: 13.8 G/DL (ref 12–16)
IMM GRANULOCYTES # BLD AUTO: 0.01 K/UL (ref 0–0.11)
IMM GRANULOCYTES NFR BLD AUTO: 0.2 % (ref 0–0.9)
LYMPHOCYTES # BLD AUTO: 2.1 K/UL (ref 1–4.8)
LYMPHOCYTES NFR BLD: 34 % (ref 22–41)
MCH RBC QN AUTO: 30.7 PG (ref 27–33)
MCHC RBC AUTO-ENTMCNC: 33.3 G/DL (ref 33.6–35)
MCV RBC AUTO: 92.4 FL (ref 81.4–97.8)
MONOCYTES # BLD AUTO: 0.33 K/UL (ref 0–0.85)
MONOCYTES NFR BLD AUTO: 5.3 % (ref 0–13.4)
NEUTROPHILS # BLD AUTO: 3.46 K/UL (ref 2–7.15)
NEUTROPHILS NFR BLD: 56 % (ref 44–72)
NRBC # BLD AUTO: 0 K/UL
NRBC BLD-RTO: 0 /100 WBC
PLATELET # BLD AUTO: 179 K/UL (ref 164–446)
PMV BLD AUTO: 12.3 FL (ref 9–12.9)
RBC # BLD AUTO: 4.49 M/UL (ref 4.2–5.4)
WBC # BLD AUTO: 6.2 K/UL (ref 4.8–10.8)

## 2019-01-01 PROCEDURE — 36415 COLL VENOUS BLD VENIPUNCTURE: CPT

## 2019-01-01 PROCEDURE — 99214 OFFICE O/P EST MOD 30 MIN: CPT | Performed by: INTERNAL MEDICINE

## 2019-01-01 PROCEDURE — 85025 COMPLETE CBC W/AUTO DIFF WBC: CPT

## 2019-01-01 PROCEDURE — 99203 OFFICE O/P NEW LOW 30 MIN: CPT | Performed by: FAMILY MEDICINE

## 2019-01-01 PROCEDURE — 82274 ASSAY TEST FOR BLOOD FECAL: CPT

## 2019-01-01 ASSESSMENT — ENCOUNTER SYMPTOMS
VOMITING: 0
DISORIENTATION: 0
MEMORY LOSS: 0
WEAKNESS: 0
NUMBNESS: 0
HEADACHES: 0
BLURRED VISION: 0

## 2019-02-11 ENCOUNTER — NON-PROVIDER VISIT (OUTPATIENT)
Dept: URGENT CARE | Facility: PHYSICIAN GROUP | Age: 79
End: 2019-02-11

## 2019-02-11 DIAGNOSIS — Z11.1 PPD SCREENING TEST: ICD-10-CM

## 2019-02-11 PROCEDURE — 86580 TB INTRADERMAL TEST: CPT | Performed by: EMERGENCY MEDICINE

## 2019-02-13 ENCOUNTER — NON-PROVIDER VISIT (OUTPATIENT)
Dept: URGENT CARE | Facility: PHYSICIAN GROUP | Age: 79
End: 2019-02-13

## 2019-02-13 LAB — TB WHEAL 3D P 5 TU DIAM: NORMAL MM

## 2019-02-27 ENCOUNTER — TELEPHONE (OUTPATIENT)
Dept: MEDICAL GROUP | Facility: PHYSICIAN GROUP | Age: 79
End: 2019-02-27

## 2019-02-27 NOTE — TELEPHONE ENCOUNTER
Future Appointments       Provider Department Center    3/1/2019 2:55 PM Laron Gaming M.D. [facility]        ESTABLISHED PATIENT PRE-VISIT PLANNING     Patient was NOT contacted to complete PVP.       1.  Reviewed notes from the last few office visits within the medical group: Yes    2.  If any orders were placed at last visit or intended to be done for this visit (i.e. 6 mos follow-up), do we have Results/Consult Notes?        •  Labs - Labs were not ordered at last office visit.       •  Imaging - Imaging was not ordered at last office visit.       •  Referrals - No referrals were ordered at last office visit.    3. Is this appointment scheduled as a Hospital Follow-Up? No    4.  Immunizations were updated in [facility] using WebIZ?: Yes       •  Web Iz Recommendations: FLU, PNEUMOVAX (PPSV23), TDAP and SHINGRIX (Shingles)    5.  Patient is due for the following Health Maintenance Topics:   Health Maintenance Due   Topic Date Due   • IMM DTaP/Tdap/Td Vaccine (1 - Tdap) 01/19/1959   • MAMMOGRAM  01/19/1980   • COLONOSCOPY  01/19/1990   • IMM ZOSTER VACCINES (1 of 2) 01/19/1990   • IMM INFLUENZA (1) 09/01/2018   • IMM PNEUMOCOCCAL 65+ (ADULT) LOW/MEDIUM RISK SERIES (2 of 2 - PPSV23) 10/26/2018     6. Orders for overdue Health Maintenance topics pended in Pre-Charting? YES    7.  AHA (MDX) form printed for Provider? YES    8.  Patient was NOT informed to arrive 15 min prior to their scheduled appointment and bring in their medication bottles.

## 2019-03-01 ENCOUNTER — OFFICE VISIT (OUTPATIENT)
Dept: MEDICAL GROUP | Facility: PHYSICIAN GROUP | Age: 79
End: 2019-03-01
Payer: MEDICARE

## 2019-03-01 VITALS
TEMPERATURE: 98.4 F | OXYGEN SATURATION: 94 % | DIASTOLIC BLOOD PRESSURE: 82 MMHG | WEIGHT: 170 LBS | SYSTOLIC BLOOD PRESSURE: 134 MMHG | HEIGHT: 63 IN | RESPIRATION RATE: 12 BRPM | HEART RATE: 74 BPM | BODY MASS INDEX: 30.12 KG/M2

## 2019-03-01 DIAGNOSIS — E78.5 HYPERLIPIDEMIA, UNSPECIFIED HYPERLIPIDEMIA TYPE: ICD-10-CM

## 2019-03-01 DIAGNOSIS — Z00.00 ANNUAL PHYSICAL EXAM: ICD-10-CM

## 2019-03-01 DIAGNOSIS — M19.90 OSTEOARTHRITIS, UNSPECIFIED OSTEOARTHRITIS TYPE, UNSPECIFIED SITE: ICD-10-CM

## 2019-03-01 DIAGNOSIS — Z12.11 SCREENING FOR COLORECTAL CANCER: ICD-10-CM

## 2019-03-01 DIAGNOSIS — R79.89 LOW VITAMIN D LEVEL: ICD-10-CM

## 2019-03-01 DIAGNOSIS — Z12.12 SCREENING FOR COLORECTAL CANCER: ICD-10-CM

## 2019-03-01 DIAGNOSIS — Z23 NEED FOR VACCINATION: ICD-10-CM

## 2019-03-01 PROCEDURE — G0009 ADMIN PNEUMOCOCCAL VACCINE: HCPCS | Performed by: INTERNAL MEDICINE

## 2019-03-01 PROCEDURE — G0439 PPPS, SUBSEQ VISIT: HCPCS | Performed by: INTERNAL MEDICINE

## 2019-03-01 PROCEDURE — 8041 PR SCP AHA: Performed by: INTERNAL MEDICINE

## 2019-03-01 PROCEDURE — 90732 PPSV23 VACC 2 YRS+ SUBQ/IM: CPT | Performed by: INTERNAL MEDICINE

## 2019-03-01 ASSESSMENT — ENCOUNTER SYMPTOMS: GENERAL WELL-BEING: GOOD

## 2019-03-01 ASSESSMENT — PATIENT HEALTH QUESTIONNAIRE - PHQ9: CLINICAL INTERPRETATION OF PHQ2 SCORE: 0

## 2019-03-01 ASSESSMENT — ACTIVITIES OF DAILY LIVING (ADL): BATHING_REQUIRES_ASSISTANCE: 0

## 2019-03-01 NOTE — ASSESSMENT & PLAN NOTE
Continues to have OA issues with her left hip. She completed PT after she saw sports medicine with no relief. She will consider returning to sports medicine for a cortisone injection of her left knee.

## 2019-03-01 NOTE — PROGRESS NOTES
PRIMARY CARE CLINIC ANNUAL EXAM  Chief Complaint   Patient presents with   • Annual Wellness Visit     History of Present Illness     Osteoarthritis  Continues to have OA issues with her left hip. She completed PT after she saw sports medicine with no relief. She will consider returning to sports medicine for a cortisone injection of her left knee.     Annual physical exam  Elle is here for her annual physical. She is feeling well except for the persistent OA of her left hip. She will consider returning to sports medicine for a cortisone injection of her left knee. She wishes to discontinue with breast and colon cancer screening at this age.     Current Outpatient Prescriptions   Medication Sig Dispense Refill   • Cholecalciferol (VITAMIN D3) 2000 units Tab Take  by mouth.       No current facility-administered medications for this visit.      Past Medical History:   Diagnosis Date   • Acute pain of left knee 10/26/2017   • Leg swelling 10/26/2017   • Osteoarthritis 6/11/2018   • Right knee injury 4/4/2013     Past Surgical History:   Procedure Laterality Date   • CATARACT PHACO WITH IOL  1/13/2014    Performed by Samuel Vega M.D. at [facility]   • CATARACT PHACO WITH IOL  12/16/2013    Performed by Samuel Vega M.D. at [facility]   • APPENDECTOMY     • TONSILLECTOMY       Social History   Substance Use Topics   • Smoking status: Former Smoker     Packs/day: 1.00     Years: 30.00     Types: Cigarettes     Quit date: 1990   • Smokeless tobacco: Never Used   • Alcohol use Yes      Comment: rarely; 2 glasses of wine/month      Social History     Social History Narrative    Caregiver for clients at [employer]     Family History   Problem Relation Age of Onset   • Hypertension Mother    • Cancer Father         colon   • No Known Problems Brother    • No Known Problems Daughter    • No Known Problems Daughter    • No Known Problems Daughter    • Diabetes Neg Hx    • Heart Disease  "Neg Hx    • Stroke Neg Hx      Family Status   Relation Status   • Mo    • Fa    • Bro Alive, age 77y   • MGMo    • MGFa    • PGMo    • PGFa    • Josseline Alive   • Josseline Alive   • Josseline Alive   • Neg Hx (Not Specified)     Allergies: Patient has no known allergies.    ROS  Constitutional: Negative for fatigue/generalized weakness.   HEENT: Negative for  vision changes, hearing changes    Respiratory: Negative for shortness of breath  Cardiovascular: Negative for chest pain, palpitations  Gastrointestinal: Negative for blood in stool, constipation, diarrhea  Genitourinary: Negative for dysuria, polyuria  Musculoskeletal: Positive for left hip and knee pain as per HPI  Skin: Negative for rash  Neurological: Negative for numbness, tingling  Psychiatric/Behavioral: Negative for depression, anxiety       Objective   Blood pressure 134/82, pulse 74, temperature 36.9 °C (98.4 °F), temperature source Temporal, resp. rate 12, height 1.6 m (5' 3\"), weight 77.1 kg (170 lb), SpO2 94 %. Body mass index is 30.11 kg/m².    General: Alert, oriented. In no acute distress   HEET: EOMI, PERRL, conjunctiva non-injected, sclera non-icteric.  Nares patent with no significant congestion or drainage.  Cynthia pinnae, external auditory canals, TM pearly gray with normal light reflex bilaterally.Oral mucous membranes pink and moist with no lesions.  Neck: supple with no cervical, subclavicular lymphadenopathy, JVD, palpable thyroid nodules   Lungs: clear to auscultation bilaterally with good excursion.  CV: regular rate and rhythm.  Abdomen soft, non-distended, non-tender with normal bowel sounds. No hepatosplenomegaly, no masses palpated  Skin: no lesions. Warm, dry   Psychiatric: appropriate mood and affect     Assessment and Plan   The following treatment plan was discussed     1. Need for vaccination  - PneumoVax PPV23 =>1yo  - Subsequent Annual Wellness Visit - Includes PPPS ()    2. " Hyperlipidemia, unspecified hyperlipidemia type  - Comp Metabolic Panel; Future  - CBC WITH DIFFERENTIAL; Future  - Lipid Profile; Future  - Subsequent Annual Wellness Visit - Includes PPPS ()    3. Low vitamin D level  - VITAMIN D,25 HYDROXY; Future  - Subsequent Annual Wellness Visit - Includes PPPS ()    4. Screening for colorectal cancer  - Subsequent Annual Wellness Visit - Includes PPPS ()    5. Osteoarthritis, unspecified osteoarthritis type, unspecified site  - Subsequent Annual Wellness Visit - Includes PPPS ()    6. Annual physical exam  - Subsequent Annual Wellness Visit - Includes PPPS ()    Return in about 1 year (around 3/1/2020).    Health Maintenance      Health Maintenance Due   Topic Date Due   • IMM DTaP/Tdap/Td Vaccine (1 - Tdap) 01/19/1959   • IMM INFLUENZA (1) 09/01/2018   • IMM PNEUMOCOCCAL 65+ (ADULT) LOW/MEDIUM RISK SERIES (2 of 2 - PPSV23) 10/26/2018     Laron Gaming MD  Internal Medicine  [facility]

## 2019-03-01 NOTE — ASSESSMENT & PLAN NOTE
Elle is here for her annual physical. She is feeling well except for the persistent OA of her left hip. She will consider returning to sports medicine for a cortisone injection of her left knee. She wishes to discontinue with breast and colon cancer screening at this age.

## 2019-03-01 NOTE — PROGRESS NOTES
Chief Complaint   Patient presents with   • Annual Wellness Visit         HPI:  Elle is a 79 y.o. here for Medicare Annual Wellness Visit        Patient Active Problem List    Diagnosis Date Noted   • Osteoarthritis 06/11/2018       Current Outpatient Prescriptions   Medication Sig Dispense Refill   • Cholecalciferol (VITAMIN D3) 2000 units Tab Take  by mouth.       No current facility-administered medications for this visit.         Patient is taking medications as noted in medication list.  Current supplements as per medication list.     Allergies: Patient has no known allergies.    Current social contact/activities: Visiting with friends and family, working part time, dinner with friends,    Is patient current with immunizations? No, due for FLU, PNEUMOVAX (PPSV23) and TDAP. Patient is interested in receiving PNEUMOVAX (PPSV23) and TDAP today.    She  reports that she quit smoking about 29 years ago. Her smoking use included Cigarettes. She has a 30.00 pack-year smoking history. She has never used smokeless tobacco. She reports that she drinks alcohol. She reports that she does not use drugs.  Counseling given: Not Answered        DPA/Advanced directive: Patient does not have an Advanced Directive.  A packet and workshop information was given on Advanced Directives.    ROS:    Gait: Uses no assistive device   Ostomy: No   Other tubes: No   Amputations: No   Chronic oxygen use No   Last eye exam 1 year ago  Wears hearing aids: No   : Denies any urinary leakage during the last 6 months  Annual Health Assessment Questions:    1.  Are you currently engaging in any exercise or physical activity? Yes    2.  How would you describe your mood or emotional well-being today? good    3.  Have you had any falls in the last year? No    4.  Have you noticed any problems with your balance or had difficulty walking? No    5.  In the last six months have you experienced any leakage of urine? No    6. DPA/Advanced Directive:  Patient does not have an Advanced Directive.  A packet and workshop information was given on Advanced Directives.    Screening:        Depression Screening    Little interest or pleasure in doing things?  0 - not at all  Feeling down, depressed, or hopeless? 0 - not at all  Patient Health Questionnaire Score: 0    If depressive symptoms identified deferred to follow up visit unless specifically addressed in assessment and plan.    Interpretation of PHQ-9 Total Score   Score Severity   1-4 No Depression   5-9 Mild Depression   10-14 Moderate Depression   15-19 Moderately Severe Depression   20-27 Severe Depression    Screening for Cognitive Impairment    Three Minute Recall (leader, season, table)  3/3 Leader, season, table  Yariel clock face with all 12 numbers and set the hands to show 10 past 11.  Yes 11:10 5/5  If cognitive concerns identified, deferred for follow up unless specifically addressed in assessment and plan.    Fall Risk Assessment    Has the patient had two or more falls in the last year or any fall with injury in the last year?  No  If fall risk identified, deferred for follow up unless specifically addressed in assessment and plan.    Safety Assessment    Throw rugs on floor.  Yes  Handrails on all stairs.  Yes  Good lighting in all hallways.  Yes  Difficulty hearing.  No  Patient counseled about all safety risks that were identified.    Functional Assessment ADLs    Are there any barriers preventing you from cooking for yourself or meeting nutritional needs?  No.    Are there any barriers preventing you from driving safely or obtaining transportation?  No.    Are there any barriers preventing you from using a telephone or calling for help?  No.    Are there any barriers preventing you from shopping?  No.    Are there any barriers preventing you from taking care of your own finances?  No.    Are there any barriers preventing you from managing your medications?  No.    Are there any barriers  preventing you from showering, bathing or dressing yourself?  No.    Are you currently engaging in any exercise or physical activity?  Yes.  Working part time, gym,  What is your perception of your health?  Good.    Health Maintenance Summary                IMM DTaP/Tdap/Td Vaccine Overdue 1/19/1959     MAMMOGRAM Overdue 1/19/1980     COLONOSCOPY Overdue 1/19/1990     IMM INFLUENZA Overdue 9/1/2018     IMM PNEUMOCOCCAL 65+ (ADULT) LOW/MEDIUM RISK SERIES Overdue 10/26/2018      Done 10/26/2017 Imm Admin: Pneumococcal Conjugate Vaccine (Prevnar/PCV-13)    IMM ZOSTER VACCINES Postponed 6/1/2019 Originally 1/19/1990. System: vaccine not available, other system reasons    Annual Wellness Visit Next Due 6/12/2019      Done 6/11/2018     BONE DENSITY Next Due 10/26/2022      Done 10/26/2017           Patient Care Team:  Laron Gaming M.D. as PCP - General (Internal Medicine)  Peter Booker D.C. as Consulting Physician (Chiropractic)    Social History   Substance Use Topics   • Smoking status: Former Smoker     Packs/day: 1.00     Years: 30.00     Types: Cigarettes     Quit date: 1990   • Smokeless tobacco: Never Used   • Alcohol use Yes      Comment: rarely; 2 glasses of wine/month      Family History   Problem Relation Age of Onset   • Hypertension Mother    • Cancer Father         colon   • No Known Problems Brother    • No Known Problems Daughter    • No Known Problems Daughter    • No Known Problems Daughter    • Diabetes Neg Hx    • Heart Disease Neg Hx    • Stroke Neg Hx      She  has a past medical history of Acute pain of left knee (10/26/2017); Leg swelling (10/26/2017); Osteoarthritis (6/11/2018); and Right knee injury (4/4/2013).   Past Surgical History:   Procedure Laterality Date   • CATARACT PHACO WITH IOL  1/13/2014    Performed by Samuel Vega M.D. at [facility]   • CATARACT PHACO WITH IOL  12/16/2013    Performed by Samuel Vega M.D. at [facility]   • APPENDECTOMY    "  • TONSILLECTOMY             Exam:     Blood pressure 134/82, pulse 74, temperature 36.9 °C (98.4 °F), temperature source Temporal, resp. rate 12, height 1.6 m (5' 3\"), weight 77.1 kg (170 lb), SpO2 94 %. Body mass index is 30.11 kg/m².    Hearing good.    Dentition good  Alert, oriented in no acute distress.  Eye contact is good, speech goal directed, affect calm      Assessment and Plan. The following treatment and monitoring plan is recommended:    Osteoarthritis  Continues to have OA issues with her left hip. She completed PT after she saw sports medicine with no relief. She will consider returning to sports medicine for a cortisone injection of her left knee.     Annual physical exam  Elle is here for her annual physical. She is feeling well except for the persistent OA of her left hip. She will consider returning to sports medicine for a cortisone injection of her left knee. She wishes to discontinue with breast and colon cancer screening at this age.       Services suggested: No services needed at this time  Health Care Screening recommendations as per orders if indicated.  Referrals offered: PT/OT/Nutrition counseling/Behavioral Health/Smoking cessation as per orders if indicated.    Discussion today about general wellness and lifestyle habits:    · Prevent falls and reduce trip hazards; Cautioned about securing or removing rugs.  · Have a working fire alarm and carbon monoxide detector;   · Engage in regular physical activity and social activities       Follow-up: Return in about 1 year (around 3/1/2020).    "

## 2019-03-08 ENCOUNTER — HOSPITAL ENCOUNTER (OUTPATIENT)
Dept: LAB | Facility: MEDICAL CENTER | Age: 79
End: 2019-03-08
Attending: INTERNAL MEDICINE
Payer: MEDICARE

## 2019-03-08 DIAGNOSIS — E78.5 HYPERLIPIDEMIA, UNSPECIFIED HYPERLIPIDEMIA TYPE: ICD-10-CM

## 2019-03-08 DIAGNOSIS — R79.89 LOW VITAMIN D LEVEL: ICD-10-CM

## 2019-03-08 LAB
25(OH)D3 SERPL-MCNC: 42 NG/ML (ref 30–100)
ALBUMIN SERPL BCP-MCNC: 4.2 G/DL (ref 3.2–4.9)
ALBUMIN/GLOB SERPL: 1.8 G/DL
ALP SERPL-CCNC: 51 U/L (ref 30–99)
ALT SERPL-CCNC: 12 U/L (ref 2–50)
ANION GAP SERPL CALC-SCNC: 5 MMOL/L (ref 0–11.9)
AST SERPL-CCNC: 17 U/L (ref 12–45)
BASOPHILS # BLD AUTO: 0.8 % (ref 0–1.8)
BASOPHILS # BLD: 0.04 K/UL (ref 0–0.12)
BILIRUB SERPL-MCNC: 0.7 MG/DL (ref 0.1–1.5)
BUN SERPL-MCNC: 19 MG/DL (ref 8–22)
CALCIUM SERPL-MCNC: 9.6 MG/DL (ref 8.5–10.5)
CHLORIDE SERPL-SCNC: 107 MMOL/L (ref 96–112)
CHOLEST SERPL-MCNC: 211 MG/DL (ref 100–199)
CO2 SERPL-SCNC: 26 MMOL/L (ref 20–33)
CREAT SERPL-MCNC: 0.57 MG/DL (ref 0.5–1.4)
EOSINOPHIL # BLD AUTO: 0.23 K/UL (ref 0–0.51)
EOSINOPHIL NFR BLD: 4.4 % (ref 0–6.9)
ERYTHROCYTE [DISTWIDTH] IN BLOOD BY AUTOMATED COUNT: 41.7 FL (ref 35.9–50)
FASTING STATUS PATIENT QL REPORTED: NORMAL
GLOBULIN SER CALC-MCNC: 2.3 G/DL (ref 1.9–3.5)
GLUCOSE SERPL-MCNC: 93 MG/DL (ref 65–99)
HCT VFR BLD AUTO: 45.1 % (ref 37–47)
HDLC SERPL-MCNC: 53 MG/DL
HGB BLD-MCNC: 14.5 G/DL (ref 12–16)
IMM GRANULOCYTES # BLD AUTO: 0.02 K/UL (ref 0–0.11)
IMM GRANULOCYTES NFR BLD AUTO: 0.4 % (ref 0–0.9)
LDLC SERPL CALC-MCNC: 131 MG/DL
LYMPHOCYTES # BLD AUTO: 1.69 K/UL (ref 1–4.8)
LYMPHOCYTES NFR BLD: 32.4 % (ref 22–41)
MCH RBC QN AUTO: 29.8 PG (ref 27–33)
MCHC RBC AUTO-ENTMCNC: 32.2 G/DL (ref 33.6–35)
MCV RBC AUTO: 92.6 FL (ref 81.4–97.8)
MONOCYTES # BLD AUTO: 0.37 K/UL (ref 0–0.85)
MONOCYTES NFR BLD AUTO: 7.1 % (ref 0–13.4)
NEUTROPHILS # BLD AUTO: 2.87 K/UL (ref 2–7.15)
NEUTROPHILS NFR BLD: 54.9 % (ref 44–72)
NRBC # BLD AUTO: 0 K/UL
NRBC BLD-RTO: 0 /100 WBC
PLATELET # BLD AUTO: 181 K/UL (ref 164–446)
PMV BLD AUTO: 12 FL (ref 9–12.9)
POTASSIUM SERPL-SCNC: 4.3 MMOL/L (ref 3.6–5.5)
PROT SERPL-MCNC: 6.5 G/DL (ref 6–8.2)
RBC # BLD AUTO: 4.87 M/UL (ref 4.2–5.4)
SODIUM SERPL-SCNC: 138 MMOL/L (ref 135–145)
TRIGL SERPL-MCNC: 137 MG/DL (ref 0–149)
WBC # BLD AUTO: 5.2 K/UL (ref 4.8–10.8)

## 2019-03-08 PROCEDURE — 36415 COLL VENOUS BLD VENIPUNCTURE: CPT

## 2019-03-08 PROCEDURE — 85025 COMPLETE CBC W/AUTO DIFF WBC: CPT

## 2019-03-08 PROCEDURE — 80053 COMPREHEN METABOLIC PANEL: CPT

## 2019-03-08 PROCEDURE — 80061 LIPID PANEL: CPT

## 2019-03-08 PROCEDURE — 82306 VITAMIN D 25 HYDROXY: CPT

## 2019-09-04 NOTE — PATIENT INSTRUCTIONS
Hematoma  A hematoma is a collection of blood under the skin, in an organ, in a body space, in a joint space, or in other tissue. The blood can clot to form a lump that you can see and feel. The lump is often firm and may sometimes become sore and tender. Most hematomas get better in a few days to weeks. However, some hematomas may be serious and require medical care. Hematomas can range in size from very small to very large.  What are the causes?  A hematoma can be caused by a blunt or penetrating injury. It can also be caused by spontaneous leakage from a blood vessel under the skin. Spontaneous leakage from a blood vessel is more likely to occur in older people, especially those taking blood thinners. Sometimes, a hematoma can develop after certain medical procedures.  What are the signs or symptoms?  · A firm lump on the body.  · Possible pain and tenderness in the area.  · Bruising. Blue, dark blue, purple-red, or yellowish skin may appear at the site of the hematoma if the hematoma is close to the surface of the skin.  For hematomas in deeper tissues or body spaces, the signs and symptoms may be subtle. For example, an intra-abdominal hematoma may cause abdominal pain, weakness, fainting, and shortness of breath. An intracranial hematoma may cause a headache or symptoms such as weakness, trouble speaking, or a change in consciousness.  How is this diagnosed?  A hematoma can usually be diagnosed based on your medical history and a physical exam. Imaging tests may be needed if your health care provider suspects a hematoma in deeper tissues or body spaces, such as the abdomen, head, or chest. These tests may include ultrasonography or a CT scan.  How is this treated?  Hematomas usually go away on their own over time. Rarely does the blood need to be drained out of the body. Large hematomas or those that may affect vital organs will sometimes need surgical drainage or monitoring.  Follow these instructions at  home:  · Apply ice to the injured area:  ¨ Put ice in a plastic bag.  ¨ Place a towel between your skin and the bag.  ¨ Leave the ice on for 20 minutes, 2-3 times a day for the first 1 to 2 days.  · After the first 2 days, switch to using warm compresses on the hematoma.  · Elevate the injured area to help decrease pain and swelling. Wrapping the area with an elastic bandage may also be helpful. Compression helps to reduce swelling and promotes shrinking of the hematoma. Make sure the bandage is not wrapped too tight.  · If your hematoma is on a lower extremity and is painful, crutches may be helpful for a couple days.  · Only take over-the-counter or prescription medicines as directed by your health care provider.  Get help right away if:  · You have increasing pain, or your pain is not controlled with medicine.  · You have a fever.  · You have worsening swelling or discoloration.  · Your skin over the hematoma breaks or starts bleeding.  · Your hematoma is in your chest or abdomen and you have weakness, shortness of breath, or a change in consciousness.  · Your hematoma is on your scalp (caused by a fall or injury) and you have a worsening headache or a change in alertness or consciousness.  This information is not intended to replace advice given to you by your health care provider. Make sure you discuss any questions you have with your health care provider.  Document Released: 08/01/2005 Document Revised: 05/25/2017 Document Reviewed: 05/28/2014  [employer] Patient Education © 2017 [employer]

## 2019-09-11 NOTE — PROGRESS NOTES
"Subjective:     Elle Barksdale is a 79 y.o. female who presents for Head Injury (Pt fell, hit back of head, open abrasion)       Head Injury    The incident occurred 1 to 3 hours ago. The injury mechanism was a fall. There was no loss of consciousness. The volume of blood lost was minimal. The quality of the pain is described as aching and dull. The pain is mild. The pain has been constant since the injury. Pertinent negatives include no blurred vision, disorientation, headaches, memory loss, numbness, tinnitus, vomiting or weakness.     Review of Systems   HENT: Negative for tinnitus.    Eyes: Negative for blurred vision.   Gastrointestinal: Negative for vomiting.   Neurological: Negative for weakness, numbness and headaches.   Psychiatric/Behavioral: Negative for memory loss.     No Known Allergies   Objective:   /98 (BP Location: Left arm, Patient Position: Sitting, BP Cuff Size: Adult)   Pulse 72   Temp 36.8 °C (98.2 °F) (Temporal)   Resp 12   Ht 1.6 m (5' 3\")   Wt 76.2 kg (168 lb)   SpO2 95%   BMI 29.76 kg/m²   Physical Exam   Constitutional: She is oriented to person, place, and time. She appears well-developed and well-nourished. No distress.   HENT:   Head: Normocephalic. Head is with abrasion and with contusion.   Eyes: Pupils are equal, round, and reactive to light. Conjunctivae and EOM are normal.   Cardiovascular: Normal rate and regular rhythm.   No murmur heard.  Pulmonary/Chest: Effort normal and breath sounds normal. No respiratory distress.   Abdominal: Soft. She exhibits no distension. There is no tenderness.   Neurological: She is alert and oriented to person, place, and time. She has normal reflexes. No sensory deficit.   Skin: Skin is warm and dry.   Psychiatric: She has a normal mood and affect.        Assessment/Plan:   1. Hematoma    Differential diagnosis, natural history, supportive care, and indications for immediate follow-up discussed.  Avoid ASA. Tylenol PRN for " pain.

## 2019-09-12 PROBLEM — K62.5 BLOOD PER RECTUM: Status: ACTIVE | Noted: 2019-01-01

## 2019-09-12 NOTE — ASSESSMENT & PLAN NOTE
Noted a bright red clot in the toilet bowel about 2 days ago. Has been constipated a bit for a while. About once a week she will have hard stool which requires her to strain more. Denies pain with the bowel movement. Wasn't able to visualize the stool well. Has had a small bowel movement since but no blood with that. Denies dizziness, lightheadedness, nausea, vomiting, abdominal pain, previous episodes, itchiness/pain around rectum, black/marroon stools.

## 2019-09-12 NOTE — PROGRESS NOTES
PRIMARY CARE CLINIC FOLLOW UP VISIT  Chief Complaint   Patient presents with   • Bloody Stools     9/10/19     History of Present Illness     Blood per rectum  Noted a bright red clot in the toilet bowel about 2 days ago. Has been constipated a bit for a while. About once a week she will have hard stool which requires her to strain more. Denies pain with the bowel movement. Wasn't able to visualize the stool well. Has had a small bowel movement since but no blood with that. Denies dizziness, lightheadedness, nausea, vomiting, abdominal pain, previous episodes, itchiness/pain around rectum, black/marroon stools.     Current Outpatient Medications   Medication Sig Dispense Refill   • Cholecalciferol (VITAMIN D3) 2000 units Tab Take  by mouth.       No current facility-administered medications for this visit.      Past Medical History:   Diagnosis Date   • Acute pain of left knee 10/26/2017   • Leg swelling 10/26/2017   • Osteoarthritis 2018   • Right knee injury 2013     Past Surgical History:   Procedure Laterality Date   • CATARACT PHACO WITH IOL  2014    Performed by Samuel Vega M.D. at [facility]   • CATARACT PHACO WITH IOL  2013    Performed by Samuel Vega M.D. at [facility]   • APPENDECTOMY     • TONSILLECTOMY       Social History     Tobacco Use   • Smoking status: Former Smoker     Packs/day: 1.00     Years: 30.00     Pack years: 30.00     Types: Cigarettes     Last attempt to quit: 1990     Years since quittin.7   • Smokeless tobacco: Never Used   Substance Use Topics   • Alcohol use: Yes     Comment: rarely; 2 glasses of wine/month    • Drug use: No     Social History     Social History Narrative    Caregiver for clients at [employer]     Family History   Problem Relation Age of Onset   • Hypertension Mother    • Cancer Father         colon   • No Known Problems Brother    • No Known Problems Daughter    • No Known Problems Daughter    • No Known  "Problems Daughter    • Diabetes Neg Hx    • Heart Disease Neg Hx    • Stroke Neg Hx      Family Status   Relation Name Status   • Mo 93    • Fa 87    • Bro 77 Alive, age 77y   • MGMo     • MGFa     • PGMo     • PGFa     • Josseline 57 Alive   • Josseline 55 Alive   • Josseline 51 Alive   • Neg Hx  (Not Specified)     Allergies: Patient has no known allergies.    ROS  As per HPI above. All other systems reviewed and negative.        Objective   /84   Pulse 76   Temp 36.9 °C (98.5 °F)   Resp 14   Ht 1.6 m (5' 3\")   Wt 78.5 kg (173 lb)   SpO2 94%  Body mass index is 30.65 kg/m².    General: alert and oriented, pleasant, cooperative  HEENT: Normocephalic, atraumatic.   Psychiatric: appropriate mood and affect. Good insight and appropriate judgment       Assessment and Plan   The following treatment plan was discussed     1. Blood per rectum  She declined rectal exam for today. Discussed high fiber diet and she may have hemorrhoids that bled under strain. Check FIT test. If symptoms recur discussed signs for ER assessment versus returning for office assessment.   - OCCULT BLOOD FECES IMMUNOASSAY; Future      Healthcare maintenance     Health Maintenance Due   Topic Date Due   • IMM DTaP/Tdap/Td Vaccine (1 - Tdap) 1959       Return if symptoms worsen or fail to improve.    Laron Gaming MD  Internal Medicine  [facility]                   "

## 2020-01-01 ENCOUNTER — HOSPITAL ENCOUNTER (OUTPATIENT)
Dept: RADIATION ONCOLOGY | Facility: MEDICAL CENTER | Age: 80
End: 2020-03-11
Payer: MEDICARE

## 2020-01-01 ENCOUNTER — HOSPITAL ENCOUNTER (OUTPATIENT)
Dept: RADIATION ONCOLOGY | Facility: MEDICAL CENTER | Age: 80
End: 2020-02-27
Payer: MEDICARE

## 2020-01-01 ENCOUNTER — HOSPITAL ENCOUNTER (OUTPATIENT)
Dept: RADIATION ONCOLOGY | Facility: MEDICAL CENTER | Age: 80
End: 2020-03-04
Payer: MEDICARE

## 2020-01-01 ENCOUNTER — HOSPITAL ENCOUNTER (OUTPATIENT)
Dept: RADIATION ONCOLOGY | Facility: MEDICAL CENTER | Age: 80
End: 2020-03-31
Attending: RADIOLOGY
Payer: MEDICARE

## 2020-01-01 ENCOUNTER — HOME CARE VISIT (OUTPATIENT)
Dept: HOSPICE | Facility: HOSPICE | Age: 80
End: 2020-01-01
Payer: MEDICARE

## 2020-01-01 ENCOUNTER — HOSPITAL ENCOUNTER (OUTPATIENT)
Dept: RADIATION ONCOLOGY | Facility: MEDICAL CENTER | Age: 80
End: 2020-03-02
Payer: MEDICARE

## 2020-01-01 ENCOUNTER — HOSPITAL ENCOUNTER (OUTPATIENT)
Dept: RADIATION ONCOLOGY | Facility: MEDICAL CENTER | Age: 80
End: 2020-02-19
Payer: MEDICARE

## 2020-01-01 ENCOUNTER — HOSPITAL ENCOUNTER (OUTPATIENT)
Dept: RADIATION ONCOLOGY | Facility: MEDICAL CENTER | Age: 80
End: 2020-01-31
Attending: RADIOLOGY
Payer: MEDICARE

## 2020-01-01 ENCOUNTER — HOSPITAL ENCOUNTER (OUTPATIENT)
Dept: RADIATION ONCOLOGY | Facility: MEDICAL CENTER | Age: 80
End: 2020-02-29
Attending: RADIOLOGY
Payer: MEDICARE

## 2020-01-01 ENCOUNTER — HOSPITAL ENCOUNTER (INPATIENT)
Facility: REHABILITATION | Age: 80
End: 2020-01-01
Attending: PHYSICAL MEDICINE & REHABILITATION | Admitting: PHYSICAL MEDICINE & REHABILITATION
Payer: MEDICARE

## 2020-01-01 ENCOUNTER — APPOINTMENT (OUTPATIENT)
Dept: RADIOLOGY | Facility: MEDICAL CENTER | Age: 80
DRG: 853 | End: 2020-01-01
Attending: HOSPITALIST
Payer: MEDICARE

## 2020-01-01 ENCOUNTER — APPOINTMENT (OUTPATIENT)
Dept: RADIOLOGY | Facility: MEDICAL CENTER | Age: 80
End: 2020-01-01
Attending: RADIOLOGY
Payer: MEDICARE

## 2020-01-01 ENCOUNTER — HOSPITAL ENCOUNTER (OUTPATIENT)
Dept: RADIATION ONCOLOGY | Facility: MEDICAL CENTER | Age: 80
End: 2020-02-25
Payer: MEDICARE

## 2020-01-01 ENCOUNTER — HOSPITAL ENCOUNTER (OUTPATIENT)
Dept: LAB | Facility: MEDICAL CENTER | Age: 80
End: 2020-02-20
Attending: NURSE PRACTITIONER
Payer: MEDICARE

## 2020-01-01 ENCOUNTER — HOSPITAL ENCOUNTER (OUTPATIENT)
Dept: RADIOLOGY | Facility: MEDICAL CENTER | Age: 80
End: 2020-01-09
Attending: INTERNAL MEDICINE
Payer: MEDICARE

## 2020-01-01 ENCOUNTER — DOCUMENTATION (OUTPATIENT)
Dept: NUTRITION | Facility: MEDICAL CENTER | Age: 80
End: 2020-01-01

## 2020-01-01 ENCOUNTER — HOSPITAL ENCOUNTER (OUTPATIENT)
Dept: RADIATION ONCOLOGY | Facility: MEDICAL CENTER | Age: 80
End: 2020-02-21
Payer: MEDICARE

## 2020-01-01 ENCOUNTER — APPOINTMENT (OUTPATIENT)
Dept: RADIOLOGY | Facility: MEDICAL CENTER | Age: 80
DRG: 853 | End: 2020-01-01
Attending: EMERGENCY MEDICINE
Payer: MEDICARE

## 2020-01-01 ENCOUNTER — HOSPITAL ENCOUNTER (OUTPATIENT)
Dept: RADIOLOGY | Facility: MEDICAL CENTER | Age: 80
End: 2020-03-17
Attending: INTERNAL MEDICINE
Payer: MEDICARE

## 2020-01-01 ENCOUNTER — HOSPITAL ENCOUNTER (OUTPATIENT)
Dept: RADIATION ONCOLOGY | Facility: MEDICAL CENTER | Age: 80
End: 2020-02-24
Payer: MEDICARE

## 2020-01-01 ENCOUNTER — ANESTHESIA (OUTPATIENT)
Dept: SURGERY | Facility: MEDICAL CENTER | Age: 80
DRG: 853 | End: 2020-01-01
Payer: MEDICARE

## 2020-01-01 ENCOUNTER — HOSPITAL ENCOUNTER (OUTPATIENT)
Dept: RADIATION ONCOLOGY | Facility: MEDICAL CENTER | Age: 80
End: 2020-03-13
Payer: MEDICARE

## 2020-01-01 ENCOUNTER — HOSPITAL ENCOUNTER (INPATIENT)
Facility: MEDICAL CENTER | Age: 80
LOS: 1 days | DRG: 951 | End: 2020-04-09
Attending: INTERNAL MEDICINE | Admitting: INTERNAL MEDICINE
Payer: COMMERCIAL

## 2020-01-01 ENCOUNTER — ANESTHESIA EVENT (OUTPATIENT)
Dept: SURGERY | Facility: MEDICAL CENTER | Age: 80
DRG: 853 | End: 2020-01-01
Payer: MEDICARE

## 2020-01-01 ENCOUNTER — HOSPITAL ENCOUNTER (OUTPATIENT)
Dept: RADIATION ONCOLOGY | Facility: MEDICAL CENTER | Age: 80
End: 2020-03-19
Payer: MEDICARE

## 2020-01-01 ENCOUNTER — HOSPITAL ENCOUNTER (OUTPATIENT)
Dept: LAB | Facility: MEDICAL CENTER | Age: 80
End: 2020-02-25
Attending: INTERNAL MEDICINE
Payer: MEDICARE

## 2020-01-01 ENCOUNTER — PATIENT OUTREACH (OUTPATIENT)
Dept: OTHER | Facility: MEDICAL CENTER | Age: 80
End: 2020-01-01

## 2020-01-01 ENCOUNTER — HOSPICE ADMISSION (OUTPATIENT)
Dept: HOSPICE | Facility: HOSPICE | Age: 80
End: 2020-01-01
Payer: MEDICARE

## 2020-01-01 ENCOUNTER — HOSPITAL ENCOUNTER (OUTPATIENT)
Dept: RADIATION ONCOLOGY | Facility: MEDICAL CENTER | Age: 80
End: 2020-03-06
Payer: MEDICARE

## 2020-01-01 ENCOUNTER — HOSPITAL ENCOUNTER (OUTPATIENT)
Dept: RADIATION ONCOLOGY | Facility: MEDICAL CENTER | Age: 80
End: 2020-03-17
Payer: MEDICARE

## 2020-01-01 ENCOUNTER — HOSPITAL ENCOUNTER (INPATIENT)
Facility: MEDICAL CENTER | Age: 80
LOS: 18 days | DRG: 853 | End: 2020-04-08
Attending: EMERGENCY MEDICINE | Admitting: FAMILY MEDICINE
Payer: MEDICARE

## 2020-01-01 ENCOUNTER — HOSPITAL ENCOUNTER (OUTPATIENT)
Dept: RADIATION ONCOLOGY | Facility: MEDICAL CENTER | Age: 80
End: 2020-03-05
Payer: MEDICARE

## 2020-01-01 ENCOUNTER — APPOINTMENT (OUTPATIENT)
Dept: RADIOLOGY | Facility: MEDICAL CENTER | Age: 80
DRG: 853 | End: 2020-01-01
Attending: SURGERY
Payer: MEDICARE

## 2020-01-01 ENCOUNTER — HOSPITAL ENCOUNTER (OUTPATIENT)
Dept: RADIATION ONCOLOGY | Facility: MEDICAL CENTER | Age: 80
End: 2020-02-11
Payer: MEDICARE

## 2020-01-01 ENCOUNTER — HOSPITAL ENCOUNTER (OUTPATIENT)
Dept: LAB | Facility: MEDICAL CENTER | Age: 80
End: 2020-03-10
Attending: INTERNAL MEDICINE
Payer: MEDICARE

## 2020-01-01 ENCOUNTER — OUTPATIENT INFUSION SERVICES (OUTPATIENT)
Dept: ONCOLOGY | Facility: MEDICAL CENTER | Age: 80
End: 2020-01-01
Attending: NURSE PRACTITIONER
Payer: MEDICARE

## 2020-01-01 ENCOUNTER — HOSPITAL ENCOUNTER (OUTPATIENT)
Dept: RADIATION ONCOLOGY | Facility: MEDICAL CENTER | Age: 80
End: 2020-02-20
Payer: MEDICARE

## 2020-01-01 ENCOUNTER — HOSPITAL ENCOUNTER (OUTPATIENT)
Dept: RADIATION ONCOLOGY | Facility: MEDICAL CENTER | Age: 80
End: 2020-02-28
Payer: MEDICARE

## 2020-01-01 ENCOUNTER — HOSPITAL ENCOUNTER (OUTPATIENT)
Dept: RADIATION ONCOLOGY | Facility: MEDICAL CENTER | Age: 80
End: 2020-02-12
Payer: MEDICARE

## 2020-01-01 ENCOUNTER — HOSPITAL ENCOUNTER (OUTPATIENT)
Dept: RADIATION ONCOLOGY | Facility: MEDICAL CENTER | Age: 80
End: 2020-02-03

## 2020-01-01 ENCOUNTER — HOSPITAL ENCOUNTER (OUTPATIENT)
Dept: RADIATION ONCOLOGY | Facility: MEDICAL CENTER | Age: 80
End: 2020-02-26
Payer: MEDICARE

## 2020-01-01 ENCOUNTER — APPOINTMENT (OUTPATIENT)
Dept: RADIATION ONCOLOGY | Facility: MEDICAL CENTER | Age: 80
DRG: 853 | End: 2020-01-01
Attending: EMERGENCY MEDICINE
Payer: MEDICARE

## 2020-01-01 ENCOUNTER — HOSPITAL ENCOUNTER (OUTPATIENT)
Dept: RADIATION ONCOLOGY | Facility: MEDICAL CENTER | Age: 80
End: 2020-03-18
Payer: MEDICARE

## 2020-01-01 ENCOUNTER — OUTPATIENT INFUSION SERVICES (OUTPATIENT)
Dept: ONCOLOGY | Facility: MEDICAL CENTER | Age: 80
End: 2020-01-01
Attending: INTERNAL MEDICINE
Payer: MEDICARE

## 2020-01-01 ENCOUNTER — HOSPITAL ENCOUNTER (OUTPATIENT)
Dept: RADIATION ONCOLOGY | Facility: MEDICAL CENTER | Age: 80
End: 2020-03-09
Payer: MEDICARE

## 2020-01-01 ENCOUNTER — HOSPITAL ENCOUNTER (OUTPATIENT)
Dept: RADIATION ONCOLOGY | Facility: MEDICAL CENTER | Age: 80
End: 2020-03-03
Payer: MEDICARE

## 2020-01-01 ENCOUNTER — HOSPITAL ENCOUNTER (OUTPATIENT)
Dept: RADIATION ONCOLOGY | Facility: MEDICAL CENTER | Age: 80
End: 2020-03-12
Payer: MEDICARE

## 2020-01-01 ENCOUNTER — HOSPITAL ENCOUNTER (OUTPATIENT)
Dept: LAB | Facility: MEDICAL CENTER | Age: 80
End: 2020-01-09
Attending: INTERNAL MEDICINE
Payer: MEDICARE

## 2020-01-01 ENCOUNTER — HOSPITAL ENCOUNTER (OUTPATIENT)
Dept: RADIATION ONCOLOGY | Facility: MEDICAL CENTER | Age: 80
End: 2020-02-13
Payer: MEDICARE

## 2020-01-01 ENCOUNTER — HOSPITAL ENCOUNTER (OUTPATIENT)
Dept: RADIOLOGY | Facility: MEDICAL CENTER | Age: 80
End: 2020-01-23
Attending: SURGERY
Payer: MEDICARE

## 2020-01-01 ENCOUNTER — HOSPITAL ENCOUNTER (OUTPATIENT)
Dept: RADIATION ONCOLOGY | Facility: MEDICAL CENTER | Age: 80
End: 2020-02-14
Payer: MEDICARE

## 2020-01-01 ENCOUNTER — HOSPITAL ENCOUNTER (OUTPATIENT)
Dept: RADIATION ONCOLOGY | Facility: MEDICAL CENTER | Age: 80
End: 2020-03-20
Payer: MEDICARE

## 2020-01-01 ENCOUNTER — HOSPITAL ENCOUNTER (OUTPATIENT)
Dept: RADIATION ONCOLOGY | Facility: MEDICAL CENTER | Age: 80
End: 2020-03-10
Payer: MEDICARE

## 2020-01-01 ENCOUNTER — HOSPITAL ENCOUNTER (OUTPATIENT)
Dept: RADIATION ONCOLOGY | Facility: MEDICAL CENTER | Age: 80
End: 2020-02-18
Payer: MEDICARE

## 2020-01-01 ENCOUNTER — HOSPITAL ENCOUNTER (OUTPATIENT)
Dept: RADIATION ONCOLOGY | Facility: MEDICAL CENTER | Age: 80
End: 2020-03-16
Payer: MEDICARE

## 2020-01-01 VITALS
BODY MASS INDEX: 28.36 KG/M2 | RESPIRATION RATE: 18 BRPM | HEIGHT: 62 IN | DIASTOLIC BLOOD PRESSURE: 66 MMHG | SYSTOLIC BLOOD PRESSURE: 103 MMHG | TEMPERATURE: 97.2 F | HEART RATE: 95 BPM | OXYGEN SATURATION: 98 % | WEIGHT: 154.1 LBS

## 2020-01-01 VITALS
BODY MASS INDEX: 29.65 KG/M2 | DIASTOLIC BLOOD PRESSURE: 91 MMHG | WEIGHT: 167.4 LBS | TEMPERATURE: 96.6 F | SYSTOLIC BLOOD PRESSURE: 152 MMHG | OXYGEN SATURATION: 96 % | HEART RATE: 82 BPM

## 2020-01-01 VITALS
HEIGHT: 63 IN | TEMPERATURE: 97.2 F | SYSTOLIC BLOOD PRESSURE: 151 MMHG | HEART RATE: 71 BPM | WEIGHT: 168.9 LBS | BODY MASS INDEX: 29.93 KG/M2 | DIASTOLIC BLOOD PRESSURE: 82 MMHG | OXYGEN SATURATION: 96 %

## 2020-01-01 VITALS
DIASTOLIC BLOOD PRESSURE: 57 MMHG | SYSTOLIC BLOOD PRESSURE: 107 MMHG | HEART RATE: 99 BPM | RESPIRATION RATE: 18 BRPM | TEMPERATURE: 97.1 F | HEIGHT: 61 IN | OXYGEN SATURATION: 93 % | BODY MASS INDEX: 29.3 KG/M2 | WEIGHT: 155.2 LBS

## 2020-01-01 VITALS
HEART RATE: 57 BPM | BODY MASS INDEX: 28.76 KG/M2 | RESPIRATION RATE: 18 BRPM | TEMPERATURE: 97.1 F | HEIGHT: 61 IN | SYSTOLIC BLOOD PRESSURE: 94 MMHG | DIASTOLIC BLOOD PRESSURE: 64 MMHG | WEIGHT: 152.34 LBS | OXYGEN SATURATION: 91 %

## 2020-01-01 VITALS
BODY MASS INDEX: 29.95 KG/M2 | DIASTOLIC BLOOD PRESSURE: 55 MMHG | TEMPERATURE: 98 F | HEIGHT: 63 IN | RESPIRATION RATE: 10 BRPM | WEIGHT: 169 LBS | HEART RATE: 108 BPM | OXYGEN SATURATION: 86 % | SYSTOLIC BLOOD PRESSURE: 98 MMHG

## 2020-01-01 VITALS
DIASTOLIC BLOOD PRESSURE: 54 MMHG | SYSTOLIC BLOOD PRESSURE: 104 MMHG | HEART RATE: 79 BPM | OXYGEN SATURATION: 99 % | TEMPERATURE: 97.4 F

## 2020-01-01 VITALS
OXYGEN SATURATION: 97 % | BODY MASS INDEX: 29.72 KG/M2 | DIASTOLIC BLOOD PRESSURE: 70 MMHG | SYSTOLIC BLOOD PRESSURE: 126 MMHG | TEMPERATURE: 98.6 F | HEART RATE: 87 BPM | WEIGHT: 167.8 LBS

## 2020-01-01 VITALS
OXYGEN SATURATION: 95 % | HEIGHT: 63 IN | SYSTOLIC BLOOD PRESSURE: 101 MMHG | DIASTOLIC BLOOD PRESSURE: 65 MMHG | HEART RATE: 101 BPM | BODY MASS INDEX: 30 KG/M2 | WEIGHT: 169.31 LBS | TEMPERATURE: 97.3 F | RESPIRATION RATE: 19 BRPM

## 2020-01-01 VITALS
BODY MASS INDEX: 29.92 KG/M2 | HEART RATE: 81 BPM | DIASTOLIC BLOOD PRESSURE: 89 MMHG | OXYGEN SATURATION: 95 % | WEIGHT: 168.9 LBS | SYSTOLIC BLOOD PRESSURE: 169 MMHG | TEMPERATURE: 96.9 F

## 2020-01-01 VITALS
SYSTOLIC BLOOD PRESSURE: 135 MMHG | HEART RATE: 85 BPM | BODY MASS INDEX: 30.2 KG/M2 | WEIGHT: 170.5 LBS | OXYGEN SATURATION: 96 % | TEMPERATURE: 97.5 F | DIASTOLIC BLOOD PRESSURE: 78 MMHG

## 2020-01-01 VITALS
OXYGEN SATURATION: 93 % | HEART RATE: 105 BPM | TEMPERATURE: 97.8 F | SYSTOLIC BLOOD PRESSURE: 99 MMHG | DIASTOLIC BLOOD PRESSURE: 67 MMHG

## 2020-01-01 VITALS — RESPIRATION RATE: 18 BRPM

## 2020-01-01 DIAGNOSIS — R65.21 SEPTIC SHOCK (HCC): ICD-10-CM

## 2020-01-01 DIAGNOSIS — K63.1 PERFORATION OF INTESTINE (HCC): ICD-10-CM

## 2020-01-01 DIAGNOSIS — C20 RECTAL CANCER (HCC): ICD-10-CM

## 2020-01-01 DIAGNOSIS — J95.821 RESPIRATORY FAILURE FOLLOWING TRAUMA AND SURGERY (HCC): ICD-10-CM

## 2020-01-01 DIAGNOSIS — C20 MALIGNANT NEOPLASM OF RECTUM (HCC): ICD-10-CM

## 2020-01-01 DIAGNOSIS — E87.6 HYPOKALEMIA: ICD-10-CM

## 2020-01-01 DIAGNOSIS — E83.39 HYPOPHOSPHATEMIA: ICD-10-CM

## 2020-01-01 DIAGNOSIS — R16.0 LIVER MASS, LEFT LOBE: ICD-10-CM

## 2020-01-01 DIAGNOSIS — Z65.8 PSYCHOSOCIAL DISTRESS: Primary | ICD-10-CM

## 2020-01-01 DIAGNOSIS — R60.0 LOCALIZED EDEMA: ICD-10-CM

## 2020-01-01 DIAGNOSIS — D61.818 PANCYTOPENIA (HCC): ICD-10-CM

## 2020-01-01 DIAGNOSIS — B96.20 BACTEREMIA DUE TO ESCHERICHIA COLI: ICD-10-CM

## 2020-01-01 DIAGNOSIS — D12.5 ADENOMATOUS POLYP OF SIGMOID COLON: ICD-10-CM

## 2020-01-01 DIAGNOSIS — J93.9 PNEUMOTHORAX, UNSPECIFIED TYPE: ICD-10-CM

## 2020-01-01 DIAGNOSIS — E87.0 HYPERNATREMIA: ICD-10-CM

## 2020-01-01 DIAGNOSIS — R78.81 BACTEREMIA DUE TO ESCHERICHIA COLI: ICD-10-CM

## 2020-01-01 DIAGNOSIS — E43 SEVERE PROTEIN-CALORIE MALNUTRITION (HCC): ICD-10-CM

## 2020-01-01 DIAGNOSIS — A41.9 SEPTIC SHOCK (HCC): ICD-10-CM

## 2020-01-01 DIAGNOSIS — N17.9 ACUTE KIDNEY INJURY (HCC): ICD-10-CM

## 2020-01-01 DIAGNOSIS — S31.000A WOUND OF SACRAL REGION, INITIAL ENCOUNTER: ICD-10-CM

## 2020-01-01 LAB
ABO + RH BLD: NORMAL
ABO GROUP BLD: NORMAL
ACTION RANGE TRIGGERED IACRT: YES
ALBUMIN SERPL BCP-MCNC: 1.4 G/DL (ref 3.2–4.9)
ALBUMIN SERPL BCP-MCNC: 1.4 G/DL (ref 3.2–4.9)
ALBUMIN SERPL BCP-MCNC: 1.5 G/DL (ref 3.2–4.9)
ALBUMIN SERPL BCP-MCNC: 1.6 G/DL (ref 3.2–4.9)
ALBUMIN SERPL BCP-MCNC: 1.9 G/DL (ref 3.2–4.9)
ALBUMIN SERPL BCP-MCNC: 2.2 G/DL (ref 3.2–4.9)
ALBUMIN SERPL BCP-MCNC: 2.7 G/DL (ref 3.2–4.9)
ALBUMIN SERPL BCP-MCNC: 3.4 G/DL (ref 3.2–4.9)
ALBUMIN SERPL BCP-MCNC: 3.5 G/DL (ref 3.2–4.9)
ALBUMIN SERPL BCP-MCNC: 4.5 G/DL (ref 3.2–4.9)
ALBUMIN/GLOB SERPL: 0.6 G/DL
ALBUMIN/GLOB SERPL: 0.7 G/DL
ALBUMIN/GLOB SERPL: 0.7 G/DL
ALBUMIN/GLOB SERPL: 0.8 G/DL
ALBUMIN/GLOB SERPL: 0.8 G/DL
ALBUMIN/GLOB SERPL: 1.4 G/DL
ALBUMIN/GLOB SERPL: 2 G/DL
ALBUMIN/GLOB SERPL: 2 G/DL
ALBUMIN/GLOB SERPL: 2.5 G/DL
ALBUMIN/GLOB SERPL: ABNORMAL G/DL
ALBUMIN/GLOB SERPL: ABNORMAL G/DL
ALP SERPL-CCNC: 102 U/L (ref 30–99)
ALP SERPL-CCNC: 116 U/L (ref 30–99)
ALP SERPL-CCNC: 41 U/L (ref 30–99)
ALP SERPL-CCNC: 41 U/L (ref 30–99)
ALP SERPL-CCNC: 57 U/L (ref 30–99)
ALP SERPL-CCNC: 60 U/L (ref 30–99)
ALP SERPL-CCNC: 71 U/L (ref 30–99)
ALP SERPL-CCNC: 73 U/L (ref 30–99)
ALP SERPL-CCNC: 74 U/L (ref 30–99)
ALP SERPL-CCNC: 74 U/L (ref 30–99)
ALP SERPL-CCNC: 78 U/L (ref 30–99)
ALP SERPL-CCNC: 79 U/L (ref 30–99)
ALP SERPL-CCNC: 83 U/L (ref 30–99)
ALP SERPL-CCNC: 85 U/L (ref 30–99)
ALP SERPL-CCNC: 91 U/L (ref 30–99)
ALT SERPL-CCNC: 11 U/L (ref 2–50)
ALT SERPL-CCNC: 12 U/L (ref 2–50)
ALT SERPL-CCNC: 13 U/L (ref 2–50)
ALT SERPL-CCNC: 13 U/L (ref 2–50)
ALT SERPL-CCNC: 14 U/L (ref 2–50)
ALT SERPL-CCNC: 15 U/L (ref 2–50)
ALT SERPL-CCNC: 16 U/L (ref 2–50)
ALT SERPL-CCNC: 16 U/L (ref 2–50)
ALT SERPL-CCNC: 25 U/L (ref 2–50)
ALT SERPL-CCNC: 26 U/L (ref 2–50)
ANION GAP SERPL CALC-SCNC: 10 MMOL/L (ref 7–16)
ANION GAP SERPL CALC-SCNC: 10 MMOL/L (ref 7–16)
ANION GAP SERPL CALC-SCNC: 11 MMOL/L (ref 7–16)
ANION GAP SERPL CALC-SCNC: 12 MMOL/L (ref 7–16)
ANION GAP SERPL CALC-SCNC: 13 MMOL/L (ref 0–11.9)
ANION GAP SERPL CALC-SCNC: 13 MMOL/L (ref 0–11.9)
ANION GAP SERPL CALC-SCNC: 13 MMOL/L (ref 7–16)
ANION GAP SERPL CALC-SCNC: 16 MMOL/L (ref 7–16)
ANION GAP SERPL CALC-SCNC: 19 MMOL/L (ref 7–16)
ANION GAP SERPL CALC-SCNC: 20 MMOL/L (ref 7–16)
ANION GAP SERPL CALC-SCNC: 28 MMOL/L (ref 7–16)
ANION GAP SERPL CALC-SCNC: 5 MMOL/L (ref 7–16)
ANION GAP SERPL CALC-SCNC: 6 MMOL/L (ref 0–11.9)
ANION GAP SERPL CALC-SCNC: 6 MMOL/L (ref 7–16)
ANION GAP SERPL CALC-SCNC: 7 MMOL/L (ref 7–16)
ANION GAP SERPL CALC-SCNC: 8 MMOL/L (ref 7–16)
ANION GAP SERPL CALC-SCNC: 9 MMOL/L (ref 0–11.9)
ANION GAP SERPL CALC-SCNC: 9 MMOL/L (ref 7–16)
ANISOCYTOSIS BLD QL SMEAR: ABNORMAL
APPEARANCE UR: ABNORMAL
APTT PPP: 26.1 SEC (ref 24.7–36)
AST SERPL-CCNC: 10 U/L (ref 12–45)
AST SERPL-CCNC: 12 U/L (ref 12–45)
AST SERPL-CCNC: 13 U/L (ref 12–45)
AST SERPL-CCNC: 13 U/L (ref 12–45)
AST SERPL-CCNC: 14 U/L (ref 12–45)
AST SERPL-CCNC: 16 U/L (ref 12–45)
AST SERPL-CCNC: 17 U/L (ref 12–45)
AST SERPL-CCNC: 19 U/L (ref 12–45)
AST SERPL-CCNC: 20 U/L (ref 12–45)
AST SERPL-CCNC: 22 U/L (ref 12–45)
AST SERPL-CCNC: 26 U/L (ref 12–45)
AST SERPL-CCNC: 27 U/L (ref 12–45)
AST SERPL-CCNC: 30 U/L (ref 12–45)
BACTERIA #/AREA URNS HPF: ABNORMAL /HPF
BACTERIA BLD CULT: ABNORMAL
BACTERIA SPEC ANAEROBE CULT: NORMAL
BACTERIA UR CULT: ABNORMAL
BACTERIA UR CULT: ABNORMAL
BACTERIA WND AEROBE CULT: NORMAL
BARCODED ABORH UBTYP: 6200
BARCODED PRD CODE UBPRD: NORMAL
BARCODED UNIT NUM UBUNT: NORMAL
BASE EXCESS BLDA CALC-SCNC: 0 MMOL/L (ref -4–3)
BASOPHILS # BLD AUTO: 0 % (ref 0–1.8)
BASOPHILS # BLD AUTO: 0.5 % (ref 0–1.8)
BASOPHILS # BLD AUTO: 7.7 % (ref 0–1.8)
BASOPHILS # BLD: 0 K/UL (ref 0–0.12)
BASOPHILS # BLD: 0.03 K/UL (ref 0–0.12)
BASOPHILS # BLD: 0.72 K/UL (ref 0–0.12)
BILIRUB SERPL-MCNC: 0.4 MG/DL (ref 0.1–1.5)
BILIRUB SERPL-MCNC: 0.5 MG/DL (ref 0.1–1.5)
BILIRUB SERPL-MCNC: 0.7 MG/DL (ref 0.1–1.5)
BILIRUB SERPL-MCNC: 0.8 MG/DL (ref 0.1–1.5)
BILIRUB SERPL-MCNC: 0.8 MG/DL (ref 0.1–1.5)
BILIRUB SERPL-MCNC: 1 MG/DL (ref 0.1–1.5)
BILIRUB SERPL-MCNC: 1.1 MG/DL (ref 0.1–1.5)
BILIRUB SERPL-MCNC: 1.3 MG/DL (ref 0.1–1.5)
BILIRUB UR QL STRIP.AUTO: ABNORMAL
BLD GP AB SCN SERPL QL: NORMAL
BODY TEMPERATURE: ABNORMAL DEGREES
BUN SERPL-MCNC: 14 MG/DL (ref 8–22)
BUN SERPL-MCNC: 16 MG/DL (ref 8–22)
BUN SERPL-MCNC: 17 MG/DL (ref 8–22)
BUN SERPL-MCNC: 18 MG/DL (ref 8–22)
BUN SERPL-MCNC: 19 MG/DL (ref 8–22)
BUN SERPL-MCNC: 20 MG/DL (ref 8–22)
BUN SERPL-MCNC: 22 MG/DL (ref 8–22)
BUN SERPL-MCNC: 22 MG/DL (ref 8–22)
BUN SERPL-MCNC: 25 MG/DL (ref 8–22)
BUN SERPL-MCNC: 28 MG/DL (ref 8–22)
BUN SERPL-MCNC: 29 MG/DL (ref 8–22)
BUN SERPL-MCNC: 29 MG/DL (ref 8–22)
BUN SERPL-MCNC: 30 MG/DL (ref 8–22)
BUN SERPL-MCNC: 30 MG/DL (ref 8–22)
BUN SERPL-MCNC: 32 MG/DL (ref 8–22)
BUN SERPL-MCNC: 40 MG/DL (ref 8–22)
BUN SERPL-MCNC: 42 MG/DL (ref 8–22)
BUN SERPL-MCNC: 48 MG/DL (ref 8–22)
BUN SERPL-MCNC: 60 MG/DL (ref 8–22)
BUN SERPL-MCNC: 63 MG/DL (ref 8–22)
BUN SERPL-MCNC: 66 MG/DL (ref 8–22)
BUN SERPL-MCNC: 68 MG/DL (ref 8–22)
BUN SERPL-MCNC: 72 MG/DL (ref 8–22)
BURR CELLS BLD QL SMEAR: NORMAL
C DIFF DNA SPEC QL NAA+PROBE: NEGATIVE
C DIFF TOX GENS STL QL NAA+PROBE: NEGATIVE
CA-I BLD ISE-SCNC: 0.91 MMOL/L (ref 1.1–1.3)
CA-I SERPL-SCNC: 1 MMOL/L (ref 1.1–1.3)
CALCIUM SERPL-MCNC: 6.4 MG/DL (ref 8.5–10.5)
CALCIUM SERPL-MCNC: 6.5 MG/DL (ref 8.5–10.5)
CALCIUM SERPL-MCNC: 6.5 MG/DL (ref 8.5–10.5)
CALCIUM SERPL-MCNC: 6.7 MG/DL (ref 8.5–10.5)
CALCIUM SERPL-MCNC: 6.8 MG/DL (ref 8.5–10.5)
CALCIUM SERPL-MCNC: 6.9 MG/DL (ref 8.5–10.5)
CALCIUM SERPL-MCNC: 7 MG/DL (ref 8.5–10.5)
CALCIUM SERPL-MCNC: 7.1 MG/DL (ref 8.5–10.5)
CALCIUM SERPL-MCNC: 7.2 MG/DL (ref 8.5–10.5)
CALCIUM SERPL-MCNC: 7.7 MG/DL (ref 8.5–10.5)
CALCIUM SERPL-MCNC: 8 MG/DL (ref 8.5–10.5)
CALCIUM SERPL-MCNC: 8.2 MG/DL (ref 8.5–10.5)
CALCIUM SERPL-MCNC: 8.3 MG/DL (ref 8.5–10.5)
CALCIUM SERPL-MCNC: 8.6 MG/DL (ref 8.5–10.5)
CALCIUM SERPL-MCNC: 9 MG/DL (ref 8.5–10.5)
CALCIUM SERPL-MCNC: 9.6 MG/DL (ref 8.4–10.2)
CEA SERPL-MCNC: 1.7 NG/ML (ref 0–3)
CHEMOTHERAPY INFUSION START DATE: NORMAL
CHEMOTHERAPY INFUSION STOP DATE: NORMAL
CHEMOTHERAPY RECORDS: 1.8
CHEMOTHERAPY RECORDS: 4500
CHEMOTHERAPY RECORDS: 540
CHEMOTHERAPY RECORDS: NORMAL
CHEMOTHERAPY RX CANCER: NORMAL
CHLORIDE SERPL-SCNC: 103 MMOL/L (ref 96–112)
CHLORIDE SERPL-SCNC: 105 MMOL/L (ref 96–112)
CHLORIDE SERPL-SCNC: 106 MMOL/L (ref 96–112)
CHLORIDE SERPL-SCNC: 108 MMOL/L (ref 96–112)
CHLORIDE SERPL-SCNC: 108 MMOL/L (ref 96–112)
CHLORIDE SERPL-SCNC: 111 MMOL/L (ref 96–112)
CHLORIDE SERPL-SCNC: 113 MMOL/L (ref 96–112)
CHLORIDE SERPL-SCNC: 115 MMOL/L (ref 96–112)
CHLORIDE SERPL-SCNC: 116 MMOL/L (ref 96–112)
CHLORIDE SERPL-SCNC: 117 MMOL/L (ref 96–112)
CHLORIDE SERPL-SCNC: 118 MMOL/L (ref 96–112)
CHLORIDE SERPL-SCNC: 119 MMOL/L (ref 96–112)
CHLORIDE SERPL-SCNC: 120 MMOL/L (ref 96–112)
CHLORIDE SERPL-SCNC: 92 MMOL/L (ref 96–112)
CHLORIDE SERPL-SCNC: 95 MMOL/L (ref 96–112)
CHLORIDE SERPL-SCNC: 97 MMOL/L (ref 96–112)
CHLORIDE SERPL-SCNC: 99 MMOL/L (ref 96–112)
CO2 BLDA-SCNC: 25 MMOL/L (ref 20–33)
CO2 SERPL-SCNC: 18 MMOL/L (ref 20–33)
CO2 SERPL-SCNC: 21 MMOL/L (ref 20–33)
CO2 SERPL-SCNC: 22 MMOL/L (ref 20–33)
CO2 SERPL-SCNC: 23 MMOL/L (ref 20–33)
CO2 SERPL-SCNC: 24 MMOL/L (ref 20–33)
CO2 SERPL-SCNC: 25 MMOL/L (ref 20–33)
CO2 SERPL-SCNC: 26 MMOL/L (ref 20–33)
CO2 SERPL-SCNC: 26 MMOL/L (ref 20–33)
CO2 SERPL-SCNC: 27 MMOL/L (ref 20–33)
CO2 SERPL-SCNC: 27 MMOL/L (ref 20–33)
CO2 SERPL-SCNC: 28 MMOL/L (ref 20–33)
CO2 SERPL-SCNC: 28 MMOL/L (ref 20–33)
CO2 SERPL-SCNC: 29 MMOL/L (ref 20–33)
CO2 SERPL-SCNC: 30 MMOL/L (ref 20–33)
CO2 SERPL-SCNC: 31 MMOL/L (ref 20–33)
CO2 SERPL-SCNC: 32 MMOL/L (ref 20–33)
CO2 SERPL-SCNC: 33 MMOL/L (ref 20–33)
COLOR UR: YELLOW
COMPONENT R 8504R: NORMAL
CREAT SERPL-MCNC: 0.29 MG/DL (ref 0.5–1.4)
CREAT SERPL-MCNC: 0.29 MG/DL (ref 0.5–1.4)
CREAT SERPL-MCNC: 0.3 MG/DL (ref 0.5–1.4)
CREAT SERPL-MCNC: 0.34 MG/DL (ref 0.5–1.4)
CREAT SERPL-MCNC: 0.36 MG/DL (ref 0.5–1.4)
CREAT SERPL-MCNC: 0.39 MG/DL (ref 0.5–1.4)
CREAT SERPL-MCNC: 0.39 MG/DL (ref 0.5–1.4)
CREAT SERPL-MCNC: 0.4 MG/DL (ref 0.5–1.4)
CREAT SERPL-MCNC: 0.4 MG/DL (ref 0.5–1.4)
CREAT SERPL-MCNC: 0.49 MG/DL (ref 0.5–1.4)
CREAT SERPL-MCNC: 0.5 MG/DL (ref 0.5–1.4)
CREAT SERPL-MCNC: 0.5 MG/DL (ref 0.5–1.4)
CREAT SERPL-MCNC: 0.53 MG/DL (ref 0.5–1.4)
CREAT SERPL-MCNC: 0.54 MG/DL (ref 0.5–1.4)
CREAT SERPL-MCNC: 0.55 MG/DL (ref 0.5–1.4)
CREAT SERPL-MCNC: 0.59 MG/DL (ref 0.5–1.4)
CREAT SERPL-MCNC: 0.59 MG/DL (ref 0.5–1.4)
CREAT SERPL-MCNC: 0.66 MG/DL (ref 0.5–1.4)
CREAT SERPL-MCNC: 0.67 MG/DL (ref 0.5–1.4)
CREAT SERPL-MCNC: 0.69 MG/DL (ref 0.5–1.4)
CREAT SERPL-MCNC: 0.74 MG/DL (ref 0.5–1.4)
CREAT SERPL-MCNC: 0.76 MG/DL (ref 0.5–1.4)
CREAT SERPL-MCNC: 0.77 MG/DL (ref 0.5–1.4)
CREAT SERPL-MCNC: 0.81 MG/DL (ref 0.5–1.4)
CREAT SERPL-MCNC: 0.82 MG/DL (ref 0.5–1.4)
CREAT SERPL-MCNC: 0.9 MG/DL (ref 0.5–1.4)
CREAT SERPL-MCNC: 1.19 MG/DL (ref 0.5–1.4)
CREAT SERPL-MCNC: 1.27 MG/DL (ref 0.5–1.4)
CREAT SERPL-MCNC: 1.29 MG/DL (ref 0.5–1.4)
CREAT SERPL-MCNC: 1.42 MG/DL (ref 0.5–1.4)
CREAT SERPL-MCNC: 1.71 MG/DL (ref 0.5–1.4)
CREAT SERPL-MCNC: 1.98 MG/DL (ref 0.5–1.4)
CRP SERPL HS-MCNC: 14.08 MG/DL (ref 0–0.75)
DATE 1ST CHEMO CANCER: NORMAL
DOHLE BOD BLD QL SMEAR: NORMAL
EKG IMPRESSION: NORMAL
EOSINOPHIL # BLD AUTO: 0 K/UL (ref 0–0.51)
EOSINOPHIL # BLD AUTO: 0.02 K/UL (ref 0–0.51)
EOSINOPHIL # BLD AUTO: 0.03 K/UL (ref 0–0.51)
EOSINOPHIL # BLD AUTO: 0.05 K/UL (ref 0–0.51)
EOSINOPHIL # BLD AUTO: 0.08 K/UL (ref 0–0.51)
EOSINOPHIL # BLD AUTO: 0.2 K/UL (ref 0–0.51)
EOSINOPHIL NFR BLD: 0 % (ref 0–6.9)
EOSINOPHIL NFR BLD: 0.6 % (ref 0–6.9)
EOSINOPHIL NFR BLD: 0.8 % (ref 0–6.9)
EOSINOPHIL NFR BLD: 0.9 % (ref 0–6.9)
EOSINOPHIL NFR BLD: 1 % (ref 0–6.9)
EOSINOPHIL NFR BLD: 1.7 % (ref 0–6.9)
EOSINOPHIL NFR BLD: 2.6 % (ref 0–6.9)
EOSINOPHIL NFR BLD: 3.4 % (ref 0–6.9)
EPI CELLS #/AREA URNS HPF: ABNORMAL /HPF
ERYTHROCYTE [DISTWIDTH] IN BLOOD BY AUTOMATED COUNT: 41.1 FL (ref 35.9–50)
ERYTHROCYTE [DISTWIDTH] IN BLOOD BY AUTOMATED COUNT: 57.5 FL (ref 35.9–50)
ERYTHROCYTE [DISTWIDTH] IN BLOOD BY AUTOMATED COUNT: 59.4 FL (ref 35.9–50)
ERYTHROCYTE [DISTWIDTH] IN BLOOD BY AUTOMATED COUNT: 59.8 FL (ref 35.9–50)
ERYTHROCYTE [DISTWIDTH] IN BLOOD BY AUTOMATED COUNT: 60.1 FL (ref 35.9–50)
ERYTHROCYTE [DISTWIDTH] IN BLOOD BY AUTOMATED COUNT: 63.1 FL (ref 35.9–50)
ERYTHROCYTE [DISTWIDTH] IN BLOOD BY AUTOMATED COUNT: 63.7 FL (ref 35.9–50)
ERYTHROCYTE [DISTWIDTH] IN BLOOD BY AUTOMATED COUNT: 63.9 FL (ref 35.9–50)
ERYTHROCYTE [DISTWIDTH] IN BLOOD BY AUTOMATED COUNT: 64.4 FL (ref 35.9–50)
ERYTHROCYTE [DISTWIDTH] IN BLOOD BY AUTOMATED COUNT: 64.5 FL (ref 35.9–50)
ERYTHROCYTE [DISTWIDTH] IN BLOOD BY AUTOMATED COUNT: 65 FL (ref 35.9–50)
ERYTHROCYTE [DISTWIDTH] IN BLOOD BY AUTOMATED COUNT: 65.1 FL (ref 35.9–50)
ERYTHROCYTE [DISTWIDTH] IN BLOOD BY AUTOMATED COUNT: 66.2 FL (ref 35.9–50)
ERYTHROCYTE [DISTWIDTH] IN BLOOD BY AUTOMATED COUNT: 66.4 FL (ref 35.9–50)
ERYTHROCYTE [DISTWIDTH] IN BLOOD BY AUTOMATED COUNT: 67 FL (ref 35.9–50)
ERYTHROCYTE [DISTWIDTH] IN BLOOD BY AUTOMATED COUNT: 67.1 FL (ref 35.9–50)
ERYTHROCYTE [DISTWIDTH] IN BLOOD BY AUTOMATED COUNT: 67.7 FL (ref 35.9–50)
ERYTHROCYTE [DISTWIDTH] IN BLOOD BY AUTOMATED COUNT: 68 FL (ref 35.9–50)
ERYTHROCYTE [DISTWIDTH] IN BLOOD BY AUTOMATED COUNT: 70.9 FL (ref 35.9–50)
FASTING STATUS PATIENT QL REPORTED: NORMAL
FOLATE SERPL-MCNC: 9.6 NG/ML
GIANT PLATELETS BLD QL SMEAR: NORMAL
GLOBULIN SER CALC-MCNC: 1.4 G/DL (ref 1.9–3.5)
GLOBULIN SER CALC-MCNC: 1.7 G/DL (ref 1.9–3.5)
GLOBULIN SER CALC-MCNC: 1.9 G/DL (ref 1.9–3.5)
GLOBULIN SER CALC-MCNC: 1.9 G/DL (ref 1.9–3.5)
GLOBULIN SER CALC-MCNC: 2.1 G/DL (ref 1.9–3.5)
GLOBULIN SER CALC-MCNC: 2.2 G/DL (ref 1.9–3.5)
GLOBULIN SER CALC-MCNC: 2.4 G/DL (ref 1.9–3.5)
GLOBULIN SER CALC-MCNC: 2.5 G/DL (ref 1.9–3.5)
GLOBULIN SER CALC-MCNC: 2.6 G/DL (ref 1.9–3.5)
GLOBULIN SER CALC-MCNC: 2.6 G/DL (ref 1.9–3.5)
GLOBULIN SER CALC-MCNC: 2.7 G/DL (ref 1.9–3.5)
GLOBULIN SER CALC-MCNC: 2.8 G/DL (ref 1.9–3.5)
GLOBULIN SER CALC-MCNC: 3.6 G/DL (ref 1.9–3.5)
GLOBULIN SER CALC-MCNC: ABNORMAL G/DL (ref 1.9–3.5)
GLOBULIN SER CALC-MCNC: ABNORMAL G/DL (ref 1.9–3.5)
GLUCOSE BLD-MCNC: 103 MG/DL (ref 65–99)
GLUCOSE SERPL-MCNC: 100 MG/DL (ref 65–99)
GLUCOSE SERPL-MCNC: 101 MG/DL (ref 65–99)
GLUCOSE SERPL-MCNC: 105 MG/DL (ref 65–99)
GLUCOSE SERPL-MCNC: 107 MG/DL (ref 65–99)
GLUCOSE SERPL-MCNC: 109 MG/DL (ref 65–99)
GLUCOSE SERPL-MCNC: 110 MG/DL (ref 65–99)
GLUCOSE SERPL-MCNC: 111 MG/DL (ref 65–99)
GLUCOSE SERPL-MCNC: 112 MG/DL (ref 65–99)
GLUCOSE SERPL-MCNC: 113 MG/DL (ref 65–99)
GLUCOSE SERPL-MCNC: 115 MG/DL (ref 65–99)
GLUCOSE SERPL-MCNC: 119 MG/DL (ref 65–99)
GLUCOSE SERPL-MCNC: 120 MG/DL (ref 65–99)
GLUCOSE SERPL-MCNC: 122 MG/DL (ref 65–99)
GLUCOSE SERPL-MCNC: 125 MG/DL (ref 65–99)
GLUCOSE SERPL-MCNC: 127 MG/DL (ref 65–99)
GLUCOSE SERPL-MCNC: 133 MG/DL (ref 65–99)
GLUCOSE SERPL-MCNC: 134 MG/DL (ref 65–99)
GLUCOSE SERPL-MCNC: 134 MG/DL (ref 65–99)
GLUCOSE SERPL-MCNC: 140 MG/DL (ref 65–99)
GLUCOSE SERPL-MCNC: 142 MG/DL (ref 65–99)
GLUCOSE SERPL-MCNC: 143 MG/DL (ref 65–99)
GLUCOSE SERPL-MCNC: 156 MG/DL (ref 65–99)
GLUCOSE SERPL-MCNC: 156 MG/DL (ref 65–99)
GLUCOSE SERPL-MCNC: 157 MG/DL (ref 65–99)
GLUCOSE SERPL-MCNC: 163 MG/DL (ref 65–99)
GLUCOSE SERPL-MCNC: 167 MG/DL (ref 65–99)
GLUCOSE SERPL-MCNC: 178 MG/DL (ref 65–99)
GLUCOSE SERPL-MCNC: 183 MG/DL (ref 65–99)
GLUCOSE SERPL-MCNC: 94 MG/DL (ref 65–99)
GLUCOSE SERPL-MCNC: 98 MG/DL (ref 65–99)
GLUCOSE UR STRIP.AUTO-MCNC: NEGATIVE MG/DL
GRAM STN SPEC: NORMAL
GRAM STN SPEC: NORMAL
HCO3 BLDA-SCNC: 24.1 MMOL/L (ref 17–25)
HCT VFR BLD AUTO: 20.3 % (ref 37–47)
HCT VFR BLD AUTO: 22 % (ref 37–47)
HCT VFR BLD AUTO: 22.4 % (ref 37–47)
HCT VFR BLD AUTO: 22.5 % (ref 37–47)
HCT VFR BLD AUTO: 23 % (ref 37–47)
HCT VFR BLD AUTO: 23.1 % (ref 37–47)
HCT VFR BLD AUTO: 23.4 % (ref 37–47)
HCT VFR BLD AUTO: 23.4 % (ref 37–47)
HCT VFR BLD AUTO: 23.7 % (ref 37–47)
HCT VFR BLD AUTO: 25.6 % (ref 37–47)
HCT VFR BLD AUTO: 25.6 % (ref 37–47)
HCT VFR BLD AUTO: 25.8 % (ref 37–47)
HCT VFR BLD AUTO: 26.1 % (ref 37–47)
HCT VFR BLD AUTO: 26.1 % (ref 37–47)
HCT VFR BLD AUTO: 27 % (ref 37–47)
HCT VFR BLD AUTO: 27.2 % (ref 37–47)
HCT VFR BLD AUTO: 28 % (ref 37–47)
HCT VFR BLD AUTO: 37.5 % (ref 37–47)
HCT VFR BLD AUTO: 44 % (ref 37–47)
HCT VFR BLD CALC: 21 % (ref 37–47)
HGB BLD-MCNC: 12.1 G/DL (ref 12–16)
HGB BLD-MCNC: 14.5 G/DL (ref 12–16)
HGB BLD-MCNC: 6.2 G/DL (ref 12–16)
HGB BLD-MCNC: 6.7 G/DL (ref 12–16)
HGB BLD-MCNC: 6.8 G/DL (ref 12–16)
HGB BLD-MCNC: 7 G/DL (ref 12–16)
HGB BLD-MCNC: 7.1 G/DL (ref 12–16)
HGB BLD-MCNC: 7.1 G/DL (ref 12–16)
HGB BLD-MCNC: 7.2 G/DL (ref 12–16)
HGB BLD-MCNC: 7.3 G/DL (ref 12–16)
HGB BLD-MCNC: 7.5 G/DL (ref 12–16)
HGB BLD-MCNC: 7.8 G/DL (ref 12–16)
HGB BLD-MCNC: 7.9 G/DL (ref 12–16)
HGB BLD-MCNC: 8.1 G/DL (ref 12–16)
HGB BLD-MCNC: 8.1 G/DL (ref 12–16)
HGB BLD-MCNC: 8.4 G/DL (ref 12–16)
HGB BLD-MCNC: 8.5 G/DL (ref 12–16)
HGB BLD-MCNC: 9 G/DL (ref 12–16)
HGB RETIC QN AUTO: 24.8 PG/CELL (ref 29–35)
HGB RETIC QN AUTO: 31 PG/CELL (ref 29–35)
HOROWITZ INDEX BLDA+IHG-RTO: 245 MM[HG]
HYALINE CASTS #/AREA URNS LPF: ABNORMAL /LPF
HYPOCHROMIA BLD QL SMEAR: ABNORMAL
IMM GRANULOCYTES # BLD AUTO: 0.01 K/UL (ref 0–0.11)
IMM GRANULOCYTES NFR BLD AUTO: 0.2 % (ref 0–0.9)
IMM RETICS NFR: 13.8 % (ref 9.3–17.4)
IMM RETICS NFR: 17.6 % (ref 9.3–17.4)
INST. QUALIFIED PATIENT IIQPT: YES
IRON SATN MFR SERPL: ABNORMAL % (ref 15–55)
IRON SERPL-MCNC: 36 UG/DL (ref 40–170)
KETONES UR STRIP.AUTO-MCNC: ABNORMAL MG/DL
LACTATE BLD-SCNC: 1.3 MMOL/L (ref 0.5–2)
LACTATE BLD-SCNC: 1.5 MMOL/L (ref 0.5–2)
LACTATE BLD-SCNC: 1.7 MMOL/L (ref 0.5–2)
LACTATE BLD-SCNC: 10.9 MMOL/L (ref 0.5–2)
LACTATE BLD-SCNC: 5.3 MMOL/L (ref 0.5–2)
LEUKOCYTE ESTERASE UR QL STRIP.AUTO: ABNORMAL
LG PLATELETS BLD QL SMEAR: NORMAL
LIPASE SERPL-CCNC: 10 U/L (ref 11–82)
LYMPHOCYTES # BLD AUTO: 0 K/UL (ref 1–4.8)
LYMPHOCYTES # BLD AUTO: 0.03 K/UL (ref 1–4.8)
LYMPHOCYTES # BLD AUTO: 0.05 K/UL (ref 1–4.8)
LYMPHOCYTES # BLD AUTO: 0.09 K/UL (ref 1–4.8)
LYMPHOCYTES # BLD AUTO: 0.11 K/UL (ref 1–4.8)
LYMPHOCYTES # BLD AUTO: 0.12 K/UL (ref 1–4.8)
LYMPHOCYTES # BLD AUTO: 0.13 K/UL (ref 1–4.8)
LYMPHOCYTES # BLD AUTO: 0.14 K/UL (ref 1–4.8)
LYMPHOCYTES # BLD AUTO: 0.16 K/UL (ref 1–4.8)
LYMPHOCYTES # BLD AUTO: 0.17 K/UL (ref 1–4.8)
LYMPHOCYTES # BLD AUTO: 0.18 K/UL (ref 1–4.8)
LYMPHOCYTES # BLD AUTO: 0.19 K/UL (ref 1–4.8)
LYMPHOCYTES # BLD AUTO: 0.21 K/UL (ref 1–4.8)
LYMPHOCYTES # BLD AUTO: 0.24 K/UL (ref 1–4.8)
LYMPHOCYTES # BLD AUTO: 0.24 K/UL (ref 1–4.8)
LYMPHOCYTES # BLD AUTO: 0.31 K/UL (ref 1–4.8)
LYMPHOCYTES # BLD AUTO: 0.39 K/UL (ref 1–4.8)
LYMPHOCYTES # BLD AUTO: 1.7 K/UL (ref 1–4.8)
LYMPHOCYTES NFR BLD: 0 % (ref 22–41)
LYMPHOCYTES NFR BLD: 0.9 % (ref 22–41)
LYMPHOCYTES NFR BLD: 1.1 % (ref 22–41)
LYMPHOCYTES NFR BLD: 10.4 % (ref 22–41)
LYMPHOCYTES NFR BLD: 12.3 % (ref 22–41)
LYMPHOCYTES NFR BLD: 13.8 % (ref 22–41)
LYMPHOCYTES NFR BLD: 2 % (ref 22–41)
LYMPHOCYTES NFR BLD: 2.6 % (ref 22–41)
LYMPHOCYTES NFR BLD: 28.9 % (ref 22–41)
LYMPHOCYTES NFR BLD: 3.5 % (ref 22–41)
LYMPHOCYTES NFR BLD: 4.4 % (ref 22–41)
LYMPHOCYTES NFR BLD: 5.2 % (ref 22–41)
LYMPHOCYTES NFR BLD: 5.3 % (ref 22–41)
LYMPHOCYTES NFR BLD: 6 % (ref 22–41)
LYMPHOCYTES NFR BLD: 7 % (ref 22–41)
LYMPHOCYTES NFR BLD: 7.1 % (ref 22–41)
LYMPHOCYTES NFR BLD: 8 % (ref 22–41)
LYMPHOCYTES NFR BLD: 8.6 % (ref 22–41)
MACROCYTES BLD QL SMEAR: ABNORMAL
MAGNESIUM SERPL-MCNC: 1.6 MG/DL (ref 1.5–2.5)
MAGNESIUM SERPL-MCNC: 1.6 MG/DL (ref 1.5–2.5)
MAGNESIUM SERPL-MCNC: 1.8 MG/DL (ref 1.5–2.5)
MAGNESIUM SERPL-MCNC: 1.8 MG/DL (ref 1.5–2.5)
MAGNESIUM SERPL-MCNC: 1.9 MG/DL (ref 1.5–2.5)
MAGNESIUM SERPL-MCNC: 2.1 MG/DL (ref 1.5–2.5)
MAGNESIUM SERPL-MCNC: 2.2 MG/DL (ref 1.5–2.5)
MAGNESIUM SERPL-MCNC: 2.2 MG/DL (ref 1.5–2.5)
MAGNESIUM SERPL-MCNC: 2.3 MG/DL (ref 1.5–2.5)
MANUAL DIFF BLD: ABNORMAL
MANUAL DIFF BLD: NORMAL
MCH RBC QN AUTO: 30 PG (ref 27–33)
MCH RBC QN AUTO: 30.4 PG (ref 27–33)
MCH RBC QN AUTO: 30.5 PG (ref 27–33)
MCH RBC QN AUTO: 30.6 PG (ref 27–33)
MCH RBC QN AUTO: 30.7 PG (ref 27–33)
MCH RBC QN AUTO: 30.8 PG (ref 27–33)
MCH RBC QN AUTO: 30.8 PG (ref 27–33)
MCH RBC QN AUTO: 30.9 PG (ref 27–33)
MCH RBC QN AUTO: 30.9 PG (ref 27–33)
MCH RBC QN AUTO: 31 PG (ref 27–33)
MCH RBC QN AUTO: 31 PG (ref 27–33)
MCH RBC QN AUTO: 31.1 PG (ref 27–33)
MCH RBC QN AUTO: 31.3 PG (ref 27–33)
MCH RBC QN AUTO: 31.3 PG (ref 27–33)
MCH RBC QN AUTO: 31.5 PG (ref 27–33)
MCH RBC QN AUTO: 31.6 PG (ref 27–33)
MCH RBC QN AUTO: 31.9 PG (ref 27–33)
MCHC RBC AUTO-ENTMCNC: 28.7 G/DL (ref 33.6–35)
MCHC RBC AUTO-ENTMCNC: 29 G/DL (ref 33.6–35)
MCHC RBC AUTO-ENTMCNC: 29.8 G/DL (ref 33.6–35)
MCHC RBC AUTO-ENTMCNC: 29.9 G/DL (ref 33.6–35)
MCHC RBC AUTO-ENTMCNC: 29.9 G/DL (ref 33.6–35)
MCHC RBC AUTO-ENTMCNC: 30.1 G/DL (ref 33.6–35)
MCHC RBC AUTO-ENTMCNC: 30.2 G/DL (ref 33.6–35)
MCHC RBC AUTO-ENTMCNC: 30.3 G/DL (ref 33.6–35)
MCHC RBC AUTO-ENTMCNC: 30.4 G/DL (ref 33.6–35)
MCHC RBC AUTO-ENTMCNC: 30.5 G/DL (ref 33.6–35)
MCHC RBC AUTO-ENTMCNC: 30.5 G/DL (ref 33.6–35)
MCHC RBC AUTO-ENTMCNC: 31.1 G/DL (ref 33.6–35)
MCHC RBC AUTO-ENTMCNC: 31.2 G/DL (ref 33.6–35)
MCHC RBC AUTO-ENTMCNC: 31.6 G/DL (ref 33.6–35)
MCHC RBC AUTO-ENTMCNC: 31.6 G/DL (ref 33.6–35)
MCHC RBC AUTO-ENTMCNC: 32.1 G/DL (ref 33.6–35)
MCHC RBC AUTO-ENTMCNC: 32.3 G/DL (ref 33.6–35)
MCHC RBC AUTO-ENTMCNC: 33 G/DL (ref 33.6–35)
MCHC RBC AUTO-ENTMCNC: 33.7 G/DL (ref 33.6–35)
MCV RBC AUTO: 100 FL (ref 81.4–97.8)
MCV RBC AUTO: 100.8 FL (ref 81.4–97.8)
MCV RBC AUTO: 101.2 FL (ref 81.4–97.8)
MCV RBC AUTO: 101.5 FL (ref 81.4–97.8)
MCV RBC AUTO: 102.6 FL (ref 81.4–97.8)
MCV RBC AUTO: 103.3 FL (ref 81.4–97.8)
MCV RBC AUTO: 104.5 FL (ref 81.4–97.8)
MCV RBC AUTO: 104.8 FL (ref 81.4–97.8)
MCV RBC AUTO: 105.2 FL (ref 81.4–97.8)
MCV RBC AUTO: 106.9 FL (ref 81.4–97.8)
MCV RBC AUTO: 107.4 FL (ref 81.4–97.8)
MCV RBC AUTO: 90.9 FL (ref 81.4–97.8)
MCV RBC AUTO: 93.7 FL (ref 81.4–97.8)
MCV RBC AUTO: 95.9 FL (ref 81.4–97.8)
MCV RBC AUTO: 96.4 FL (ref 81.4–97.8)
MCV RBC AUTO: 98.3 FL (ref 81.4–97.8)
MCV RBC AUTO: 98.8 FL (ref 81.4–97.8)
MCV RBC AUTO: 99.3 FL (ref 81.4–97.8)
MCV RBC AUTO: 99.6 FL (ref 81.4–97.8)
METAMYELOCYTES NFR BLD MANUAL: 0.9 %
METAMYELOCYTES NFR BLD MANUAL: 0.9 %
METAMYELOCYTES NFR BLD MANUAL: 1.6 %
METAMYELOCYTES NFR BLD MANUAL: 3.4 %
METAMYELOCYTES NFR BLD MANUAL: 4 %
METAMYELOCYTES NFR BLD MANUAL: 6.1 %
MICRO URNS: ABNORMAL
MICROCYTES BLD QL SMEAR: ABNORMAL
MONOCYTES # BLD AUTO: 0 K/UL (ref 0–0.85)
MONOCYTES # BLD AUTO: 0 K/UL (ref 0–0.85)
MONOCYTES # BLD AUTO: 0.02 K/UL (ref 0–0.85)
MONOCYTES # BLD AUTO: 0.02 K/UL (ref 0–0.85)
MONOCYTES # BLD AUTO: 0.03 K/UL (ref 0–0.85)
MONOCYTES # BLD AUTO: 0.03 K/UL (ref 0–0.85)
MONOCYTES # BLD AUTO: 0.07 K/UL (ref 0–0.85)
MONOCYTES # BLD AUTO: 0.08 K/UL (ref 0–0.85)
MONOCYTES # BLD AUTO: 0.09 K/UL (ref 0–0.85)
MONOCYTES # BLD AUTO: 0.1 K/UL (ref 0–0.85)
MONOCYTES # BLD AUTO: 0.11 K/UL (ref 0–0.85)
MONOCYTES # BLD AUTO: 0.11 K/UL (ref 0–0.85)
MONOCYTES # BLD AUTO: 0.12 K/UL (ref 0–0.85)
MONOCYTES # BLD AUTO: 0.16 K/UL (ref 0–0.85)
MONOCYTES # BLD AUTO: 0.34 K/UL (ref 0–0.85)
MONOCYTES # BLD AUTO: 0.35 K/UL (ref 0–0.85)
MONOCYTES NFR BLD AUTO: 0 % (ref 0–13.4)
MONOCYTES NFR BLD AUTO: 0 % (ref 0–13.4)
MONOCYTES NFR BLD AUTO: 0.9 % (ref 0–13.4)
MONOCYTES NFR BLD AUTO: 1.7 % (ref 0–13.4)
MONOCYTES NFR BLD AUTO: 2.6 % (ref 0–13.4)
MONOCYTES NFR BLD AUTO: 2.7 % (ref 0–13.4)
MONOCYTES NFR BLD AUTO: 3.4 % (ref 0–13.4)
MONOCYTES NFR BLD AUTO: 3.5 % (ref 0–13.4)
MONOCYTES NFR BLD AUTO: 4 % (ref 0–13.4)
MONOCYTES NFR BLD AUTO: 4 % (ref 0–13.4)
MONOCYTES NFR BLD AUTO: 4.3 % (ref 0–13.4)
MONOCYTES NFR BLD AUTO: 4.4 % (ref 0–13.4)
MONOCYTES NFR BLD AUTO: 5.9 % (ref 0–13.4)
MONOCYTES NFR BLD AUTO: 7 % (ref 0–13.4)
MORPHOLOGY BLD-IMP: NORMAL
MYELOCYTES NFR BLD MANUAL: 0.5 %
MYELOCYTES NFR BLD MANUAL: 0.9 %
MYELOCYTES NFR BLD MANUAL: 2 %
MYELOCYTES NFR BLD MANUAL: 4.3 %
MYELOCYTES NFR BLD MANUAL: 8.8 %
NEUTROPHILS # BLD AUTO: 1.9 K/UL (ref 2–7.15)
NEUTROPHILS # BLD AUTO: 1.9 K/UL (ref 2–7.15)
NEUTROPHILS # BLD AUTO: 2.08 K/UL (ref 2–7.15)
NEUTROPHILS # BLD AUTO: 2.16 K/UL (ref 2–7.15)
NEUTROPHILS # BLD AUTO: 2.17 K/UL (ref 2–7.15)
NEUTROPHILS # BLD AUTO: 2.32 K/UL (ref 2–7.15)
NEUTROPHILS # BLD AUTO: 2.38 K/UL (ref 2–7.15)
NEUTROPHILS # BLD AUTO: 2.62 K/UL (ref 2–7.15)
NEUTROPHILS # BLD AUTO: 2.66 K/UL (ref 2–7.15)
NEUTROPHILS # BLD AUTO: 2.67 K/UL (ref 2–7.15)
NEUTROPHILS # BLD AUTO: 2.76 K/UL (ref 2–7.15)
NEUTROPHILS # BLD AUTO: 2.77 K/UL (ref 2–7.15)
NEUTROPHILS # BLD AUTO: 2.82 K/UL (ref 2–7.15)
NEUTROPHILS # BLD AUTO: 3.35 K/UL (ref 2–7.15)
NEUTROPHILS # BLD AUTO: 3.6 K/UL (ref 2–7.15)
NEUTROPHILS # BLD AUTO: 4.21 K/UL (ref 2–7.15)
NEUTROPHILS # BLD AUTO: 8.27 K/UL (ref 2–7.15)
NEUTROPHILS # BLD AUTO: 8.77 K/UL (ref 2–7.15)
NEUTROPHILS NFR BLD: 61.1 % (ref 44–72)
NEUTROPHILS NFR BLD: 76.3 % (ref 44–72)
NEUTROPHILS NFR BLD: 76.5 % (ref 44–72)
NEUTROPHILS NFR BLD: 78.6 % (ref 44–72)
NEUTROPHILS NFR BLD: 79 % (ref 44–72)
NEUTROPHILS NFR BLD: 80.9 % (ref 44–72)
NEUTROPHILS NFR BLD: 81.9 % (ref 44–72)
NEUTROPHILS NFR BLD: 83.3 % (ref 44–72)
NEUTROPHILS NFR BLD: 84.2 % (ref 44–72)
NEUTROPHILS NFR BLD: 86 % (ref 44–72)
NEUTROPHILS NFR BLD: 86.7 % (ref 44–72)
NEUTROPHILS NFR BLD: 87.7 % (ref 44–72)
NEUTROPHILS NFR BLD: 88.6 % (ref 44–72)
NEUTROPHILS NFR BLD: 92 % (ref 44–72)
NEUTROPHILS NFR BLD: 93 % (ref 44–72)
NEUTROPHILS NFR BLD: 93.5 % (ref 44–72)
NEUTROPHILS NFR BLD: 93.9 % (ref 44–72)
NEUTROPHILS NFR BLD: 95.7 % (ref 44–72)
NEUTS BAND NFR BLD MANUAL: 0.9 % (ref 0–10)
NEUTS BAND NFR BLD MANUAL: 0.9 % (ref 0–10)
NEUTS BAND NFR BLD MANUAL: 1.7 % (ref 0–10)
NEUTS BAND NFR BLD MANUAL: 1.8 % (ref 0–10)
NEUTS BAND NFR BLD MANUAL: 13.9 % (ref 0–10)
NEUTS BAND NFR BLD MANUAL: 2.6 % (ref 0–10)
NEUTS BAND NFR BLD MANUAL: 2.9 % (ref 0–10)
NEUTS BAND NFR BLD MANUAL: 3 % (ref 0–10)
NEUTS BAND NFR BLD MANUAL: 5.3 % (ref 0–10)
NEUTS BAND NFR BLD MANUAL: 9 % (ref 0–10)
NEUTS BAND NFR BLD MANUAL: 9.4 % (ref 0–10)
NITRITE UR QL STRIP.AUTO: NEGATIVE
NRBC # BLD AUTO: 0 K/UL
NRBC # BLD AUTO: 0.02 K/UL
NRBC # BLD AUTO: 0.02 K/UL
NRBC # BLD AUTO: 0.04 K/UL
NRBC BLD-RTO: 0 /100 WBC
NRBC BLD-RTO: 0.2 /100 WBC
NRBC BLD-RTO: 0.4 /100 WBC
NRBC BLD-RTO: 0.5 /100 WBC
O2/TOTAL GAS SETTING VFR VENT: 58 %
OVALOCYTES BLD QL SMEAR: NORMAL
PCO2 BLDA: 37.9 MMHG (ref 26–37)
PCO2 TEMP ADJ BLDA: 37.9 MMHG (ref 26–37)
PH BLDA: 7.41 [PH] (ref 7.4–7.5)
PH TEMP ADJ BLDA: 7.41 [PH] (ref 7.4–7.5)
PH UR STRIP.AUTO: 6.5 [PH] (ref 5–8)
PHOSPHATE SERPL-MCNC: 1.8 MG/DL (ref 2.5–4.5)
PHOSPHATE SERPL-MCNC: 2.1 MG/DL (ref 2.5–4.5)
PHOSPHATE SERPL-MCNC: 2.2 MG/DL (ref 2.5–4.5)
PHOSPHATE SERPL-MCNC: 2.4 MG/DL (ref 2.5–4.5)
PHOSPHATE SERPL-MCNC: 2.5 MG/DL (ref 2.5–4.5)
PHOSPHATE SERPL-MCNC: 2.6 MG/DL (ref 2.5–4.5)
PHOSPHATE SERPL-MCNC: 2.7 MG/DL (ref 2.5–4.5)
PHOSPHATE SERPL-MCNC: 2.8 MG/DL (ref 2.5–4.5)
PHOSPHATE SERPL-MCNC: 3 MG/DL (ref 2.5–4.5)
PHOSPHATE SERPL-MCNC: 3.2 MG/DL (ref 2.5–4.5)
PHOSPHATE SERPL-MCNC: 4.3 MG/DL (ref 2.5–4.5)
PLATELET # BLD AUTO: 105 K/UL (ref 164–446)
PLATELET # BLD AUTO: 119 K/UL (ref 164–446)
PLATELET # BLD AUTO: 132 K/UL (ref 164–446)
PLATELET # BLD AUTO: 145 K/UL (ref 164–446)
PLATELET # BLD AUTO: 152 K/UL (ref 164–446)
PLATELET # BLD AUTO: 160 K/UL (ref 164–446)
PLATELET # BLD AUTO: 162 K/UL (ref 164–446)
PLATELET # BLD AUTO: 164 K/UL (ref 164–446)
PLATELET # BLD AUTO: 166 K/UL (ref 164–446)
PLATELET # BLD AUTO: 209 K/UL (ref 164–446)
PLATELET # BLD AUTO: 62 K/UL (ref 164–446)
PLATELET # BLD AUTO: 63 K/UL (ref 164–446)
PLATELET # BLD AUTO: 63 K/UL (ref 164–446)
PLATELET # BLD AUTO: 70 K/UL (ref 164–446)
PLATELET # BLD AUTO: 73 K/UL (ref 164–446)
PLATELET # BLD AUTO: 76 K/UL (ref 164–446)
PLATELET # BLD AUTO: 80 K/UL (ref 164–446)
PLATELET # BLD AUTO: 81 K/UL (ref 164–446)
PLATELET # BLD AUTO: 85 K/UL (ref 164–446)
PLATELET BLD QL SMEAR: NORMAL
PMV BLD AUTO: 10.7 FL (ref 9–12.9)
PMV BLD AUTO: 10.7 FL (ref 9–12.9)
PMV BLD AUTO: 10.8 FL (ref 9–12.9)
PMV BLD AUTO: 11 FL (ref 9–12.9)
PMV BLD AUTO: 11 FL (ref 9–12.9)
PMV BLD AUTO: 11.1 FL (ref 9–12.9)
PMV BLD AUTO: 11.2 FL (ref 9–12.9)
PMV BLD AUTO: 11.3 FL (ref 9–12.9)
PMV BLD AUTO: 11.3 FL (ref 9–12.9)
PMV BLD AUTO: 11.5 FL (ref 9–12.9)
PMV BLD AUTO: 11.6 FL (ref 9–12.9)
PMV BLD AUTO: 11.7 FL (ref 9–12.9)
PMV BLD AUTO: 11.8 FL (ref 9–12.9)
PMV BLD AUTO: 12 FL (ref 9–12.9)
PMV BLD AUTO: 12.1 FL (ref 9–12.9)
PMV BLD AUTO: 12.1 FL (ref 9–12.9)
PO2 BLDA: 142 MMHG (ref 64–87)
PO2 TEMP ADJ BLDA: 142 MMHG (ref 64–87)
POIKILOCYTOSIS BLD QL SMEAR: NORMAL
POLYCHROMASIA BLD QL SMEAR: NORMAL
POTASSIUM BLD-SCNC: 2.5 MMOL/L (ref 3.6–5.5)
POTASSIUM SERPL-SCNC: 2.8 MMOL/L (ref 3.6–5.5)
POTASSIUM SERPL-SCNC: 2.9 MMOL/L (ref 3.6–5.5)
POTASSIUM SERPL-SCNC: 3 MMOL/L (ref 3.6–5.5)
POTASSIUM SERPL-SCNC: 3 MMOL/L (ref 3.6–5.5)
POTASSIUM SERPL-SCNC: 3.1 MMOL/L (ref 3.6–5.5)
POTASSIUM SERPL-SCNC: 3.2 MMOL/L (ref 3.6–5.5)
POTASSIUM SERPL-SCNC: 3.3 MMOL/L (ref 3.6–5.5)
POTASSIUM SERPL-SCNC: 3.3 MMOL/L (ref 3.6–5.5)
POTASSIUM SERPL-SCNC: 3.4 MMOL/L (ref 3.6–5.5)
POTASSIUM SERPL-SCNC: 3.4 MMOL/L (ref 3.6–5.5)
POTASSIUM SERPL-SCNC: 3.5 MMOL/L (ref 3.6–5.5)
POTASSIUM SERPL-SCNC: 3.6 MMOL/L (ref 3.6–5.5)
POTASSIUM SERPL-SCNC: 3.7 MMOL/L (ref 3.6–5.5)
POTASSIUM SERPL-SCNC: 3.8 MMOL/L (ref 3.6–5.5)
POTASSIUM SERPL-SCNC: 3.9 MMOL/L (ref 3.6–5.5)
POTASSIUM SERPL-SCNC: 4 MMOL/L (ref 3.6–5.5)
POTASSIUM SERPL-SCNC: 4.1 MMOL/L (ref 3.6–5.5)
POTASSIUM SERPL-SCNC: 4.1 MMOL/L (ref 3.6–5.5)
POTASSIUM SERPL-SCNC: 4.2 MMOL/L (ref 3.6–5.5)
POTASSIUM SERPL-SCNC: 4.2 MMOL/L (ref 3.6–5.5)
PREALB SERPL-MCNC: 6.3 MG/DL (ref 18–38)
PROCALCITONIN SERPL-MCNC: 1.29 NG/ML
PRODUCT TYPE UPROD: NORMAL
PROT SERPL-MCNC: 3.4 G/DL (ref 6–8.2)
PROT SERPL-MCNC: 3.7 G/DL (ref 6–8.2)
PROT SERPL-MCNC: 3.9 G/DL (ref 6–8.2)
PROT SERPL-MCNC: 4 G/DL (ref 6–8.2)
PROT SERPL-MCNC: 4.1 G/DL (ref 6–8.2)
PROT SERPL-MCNC: 4.2 G/DL (ref 6–8.2)
PROT SERPL-MCNC: 4.3 G/DL (ref 6–8.2)
PROT SERPL-MCNC: 4.6 G/DL (ref 6–8.2)
PROT SERPL-MCNC: 4.7 G/DL (ref 6–8.2)
PROT SERPL-MCNC: 4.9 G/DL (ref 6–8.2)
PROT SERPL-MCNC: 5.1 G/DL (ref 6–8.2)
PROT SERPL-MCNC: 5.8 G/DL (ref 6–8.2)
PROT SERPL-MCNC: 6.7 G/DL (ref 6–8.2)
PROT UR QL STRIP: 100 MG/DL
RAD ONC ARIA COURSE LAST TREATMENT DATE: NORMAL
RAD ONC ARIA COURSE TREATMENT ELAPSED DAYS: NORMAL
RAD ONC ARIA REFERENCE POINT DOSAGE GIVEN TO DATE: 1.8
RAD ONC ARIA REFERENCE POINT DOSAGE GIVEN TO DATE: 1.8
RAD ONC ARIA REFERENCE POINT DOSAGE GIVEN TO DATE: 1.87
RAD ONC ARIA REFERENCE POINT DOSAGE GIVEN TO DATE: 1.89
RAD ONC ARIA REFERENCE POINT DOSAGE GIVEN TO DATE: 10.8
RAD ONC ARIA REFERENCE POINT DOSAGE GIVEN TO DATE: 11.22
RAD ONC ARIA REFERENCE POINT DOSAGE GIVEN TO DATE: 12.6
RAD ONC ARIA REFERENCE POINT DOSAGE GIVEN TO DATE: 13.09
RAD ONC ARIA REFERENCE POINT DOSAGE GIVEN TO DATE: 14.4
RAD ONC ARIA REFERENCE POINT DOSAGE GIVEN TO DATE: 14.96
RAD ONC ARIA REFERENCE POINT DOSAGE GIVEN TO DATE: 16.2
RAD ONC ARIA REFERENCE POINT DOSAGE GIVEN TO DATE: 16.84
RAD ONC ARIA REFERENCE POINT DOSAGE GIVEN TO DATE: 18
RAD ONC ARIA REFERENCE POINT DOSAGE GIVEN TO DATE: 18.71
RAD ONC ARIA REFERENCE POINT DOSAGE GIVEN TO DATE: 19.8
RAD ONC ARIA REFERENCE POINT DOSAGE GIVEN TO DATE: 20.58
RAD ONC ARIA REFERENCE POINT DOSAGE GIVEN TO DATE: 21.6
RAD ONC ARIA REFERENCE POINT DOSAGE GIVEN TO DATE: 22.45
RAD ONC ARIA REFERENCE POINT DOSAGE GIVEN TO DATE: 23.4
RAD ONC ARIA REFERENCE POINT DOSAGE GIVEN TO DATE: 24.32
RAD ONC ARIA REFERENCE POINT DOSAGE GIVEN TO DATE: 25.2
RAD ONC ARIA REFERENCE POINT DOSAGE GIVEN TO DATE: 26.19
RAD ONC ARIA REFERENCE POINT DOSAGE GIVEN TO DATE: 27
RAD ONC ARIA REFERENCE POINT DOSAGE GIVEN TO DATE: 28.06
RAD ONC ARIA REFERENCE POINT DOSAGE GIVEN TO DATE: 28.8
RAD ONC ARIA REFERENCE POINT DOSAGE GIVEN TO DATE: 29.93
RAD ONC ARIA REFERENCE POINT DOSAGE GIVEN TO DATE: 3.6
RAD ONC ARIA REFERENCE POINT DOSAGE GIVEN TO DATE: 3.6
RAD ONC ARIA REFERENCE POINT DOSAGE GIVEN TO DATE: 3.74
RAD ONC ARIA REFERENCE POINT DOSAGE GIVEN TO DATE: 3.79
RAD ONC ARIA REFERENCE POINT DOSAGE GIVEN TO DATE: 30.6
RAD ONC ARIA REFERENCE POINT DOSAGE GIVEN TO DATE: 31.8
RAD ONC ARIA REFERENCE POINT DOSAGE GIVEN TO DATE: 32.4
RAD ONC ARIA REFERENCE POINT DOSAGE GIVEN TO DATE: 33.67
RAD ONC ARIA REFERENCE POINT DOSAGE GIVEN TO DATE: 34.2
RAD ONC ARIA REFERENCE POINT DOSAGE GIVEN TO DATE: 35.54
RAD ONC ARIA REFERENCE POINT DOSAGE GIVEN TO DATE: 36
RAD ONC ARIA REFERENCE POINT DOSAGE GIVEN TO DATE: 37.41
RAD ONC ARIA REFERENCE POINT DOSAGE GIVEN TO DATE: 37.8
RAD ONC ARIA REFERENCE POINT DOSAGE GIVEN TO DATE: 39.28
RAD ONC ARIA REFERENCE POINT DOSAGE GIVEN TO DATE: 39.6
RAD ONC ARIA REFERENCE POINT DOSAGE GIVEN TO DATE: 41.15
RAD ONC ARIA REFERENCE POINT DOSAGE GIVEN TO DATE: 41.4
RAD ONC ARIA REFERENCE POINT DOSAGE GIVEN TO DATE: 43.02
RAD ONC ARIA REFERENCE POINT DOSAGE GIVEN TO DATE: 43.2
RAD ONC ARIA REFERENCE POINT DOSAGE GIVEN TO DATE: 44.89
RAD ONC ARIA REFERENCE POINT DOSAGE GIVEN TO DATE: 45
RAD ONC ARIA REFERENCE POINT DOSAGE GIVEN TO DATE: 45
RAD ONC ARIA REFERENCE POINT DOSAGE GIVEN TO DATE: 46.76
RAD ONC ARIA REFERENCE POINT DOSAGE GIVEN TO DATE: 46.76
RAD ONC ARIA REFERENCE POINT DOSAGE GIVEN TO DATE: 5.4
RAD ONC ARIA REFERENCE POINT DOSAGE GIVEN TO DATE: 5.61
RAD ONC ARIA REFERENCE POINT DOSAGE GIVEN TO DATE: 5.68
RAD ONC ARIA REFERENCE POINT DOSAGE GIVEN TO DATE: 5.68
RAD ONC ARIA REFERENCE POINT DOSAGE GIVEN TO DATE: 7.2
RAD ONC ARIA REFERENCE POINT DOSAGE GIVEN TO DATE: 7.48
RAD ONC ARIA REFERENCE POINT DOSAGE GIVEN TO DATE: 9
RAD ONC ARIA REFERENCE POINT DOSAGE GIVEN TO DATE: 9.35
RAD ONC ARIA REFERENCE POINT ID: NORMAL
RAD ONC ARIA REFERENCE POINT SESSION DOSAGE GIVEN: 1.8
RAD ONC ARIA REFERENCE POINT SESSION DOSAGE GIVEN: 1.87
RAD ONC ARIA REFERENCE POINT SESSION DOSAGE GIVEN: 1.89
RBC # BLD AUTO: 1.99 M/UL (ref 4.2–5.4)
RBC # BLD AUTO: 2.1 M/UL (ref 4.2–5.4)
RBC # BLD AUTO: 2.12 M/UL (ref 4.2–5.4)
RBC # BLD AUTO: 2.13 M/UL (ref 4.2–5.4)
RBC # BLD AUTO: 2.16 M/UL (ref 4.2–5.4)
RBC # BLD AUTO: 2.24 M/UL (ref 4.2–5.4)
RBC # BLD AUTO: 2.29 M/UL (ref 4.2–5.4)
RBC # BLD AUTO: 2.35 M/UL (ref 4.2–5.4)
RBC # BLD AUTO: 2.37 M/UL (ref 4.2–5.4)
RBC # BLD AUTO: 2.43 M/UL (ref 4.2–5.4)
RBC # BLD AUTO: 2.53 M/UL (ref 4.2–5.4)
RBC # BLD AUTO: 2.59 M/UL (ref 4.2–5.4)
RBC # BLD AUTO: 2.59 M/UL (ref 4.2–5.4)
RBC # BLD AUTO: 2.65 M/UL (ref 4.2–5.4)
RBC # BLD AUTO: 2.69 M/UL (ref 4.2–5.4)
RBC # BLD AUTO: 2.72 M/UL (ref 4.2–5.4)
RBC # BLD AUTO: 2.92 M/UL (ref 4.2–5.4)
RBC # BLD AUTO: 3.89 M/UL (ref 4.2–5.4)
RBC # BLD AUTO: 4.84 M/UL (ref 4.2–5.4)
RBC # URNS HPF: ABNORMAL /HPF
RBC BLD AUTO: PRESENT
RBC UR QL AUTO: ABNORMAL
RENAL EPI CELLS #/AREA URNS HPF: ABNORMAL /HPF
RETICS # AUTO: 0.05 M/UL (ref 0.04–0.06)
RETICS # AUTO: 0.1 M/UL (ref 0.04–0.06)
RETICS/RBC NFR: 1.9 % (ref 0.8–2.1)
RETICS/RBC NFR: 4 % (ref 0.8–2.1)
RH BLD: NORMAL
ROULEAUX BLD QL SMEAR: SLIGHT
ROULEAUX BLD QL SMEAR: SLIGHT
SAO2 % BLDA: 99 % (ref 93–99)
SCHISTOCYTES BLD QL SMEAR: NORMAL
SIGNIFICANT IND 70042: ABNORMAL
SIGNIFICANT IND 70042: NORMAL
SITE SITE: ABNORMAL
SITE SITE: NORMAL
SMUDGE CELLS BLD QL SMEAR: NORMAL
SODIUM BLD-SCNC: 141 MMOL/L (ref 135–145)
SODIUM SERPL-SCNC: 134 MMOL/L (ref 135–145)
SODIUM SERPL-SCNC: 135 MMOL/L (ref 135–145)
SODIUM SERPL-SCNC: 136 MMOL/L (ref 135–145)
SODIUM SERPL-SCNC: 137 MMOL/L (ref 135–145)
SODIUM SERPL-SCNC: 137 MMOL/L (ref 135–145)
SODIUM SERPL-SCNC: 141 MMOL/L (ref 135–145)
SODIUM SERPL-SCNC: 141 MMOL/L (ref 135–145)
SODIUM SERPL-SCNC: 142 MMOL/L (ref 135–145)
SODIUM SERPL-SCNC: 142 MMOL/L (ref 135–145)
SODIUM SERPL-SCNC: 143 MMOL/L (ref 135–145)
SODIUM SERPL-SCNC: 144 MMOL/L (ref 135–145)
SODIUM SERPL-SCNC: 146 MMOL/L (ref 135–145)
SODIUM SERPL-SCNC: 146 MMOL/L (ref 135–145)
SODIUM SERPL-SCNC: 147 MMOL/L (ref 135–145)
SODIUM SERPL-SCNC: 147 MMOL/L (ref 135–145)
SODIUM SERPL-SCNC: 148 MMOL/L (ref 135–145)
SODIUM SERPL-SCNC: 148 MMOL/L (ref 135–145)
SODIUM SERPL-SCNC: 150 MMOL/L (ref 135–145)
SODIUM SERPL-SCNC: 150 MMOL/L (ref 135–145)
SODIUM SERPL-SCNC: 151 MMOL/L (ref 135–145)
SODIUM SERPL-SCNC: 151 MMOL/L (ref 135–145)
SODIUM SERPL-SCNC: 152 MMOL/L (ref 135–145)
SODIUM SERPL-SCNC: 152 MMOL/L (ref 135–145)
SODIUM SERPL-SCNC: 153 MMOL/L (ref 135–145)
SODIUM SERPL-SCNC: 154 MMOL/L (ref 135–145)
SODIUM SERPL-SCNC: 155 MMOL/L (ref 135–145)
SODIUM SERPL-SCNC: 156 MMOL/L (ref 135–145)
SODIUM SERPL-SCNC: 157 MMOL/L (ref 135–145)
SODIUM SERPL-SCNC: 157 MMOL/L (ref 135–145)
SODIUM SERPL-SCNC: 158 MMOL/L (ref 135–145)
SODIUM SERPL-SCNC: 158 MMOL/L (ref 135–145)
SODIUM SERPL-SCNC: 159 MMOL/L (ref 135–145)
SODIUM SERPL-SCNC: 160 MMOL/L (ref 135–145)
SOURCE SOURCE: ABNORMAL
SOURCE SOURCE: NORMAL
SP GR UR STRIP.AUTO: 1.02
SPECIMEN DRAWN FROM PATIENT: ABNORMAL
SPHEROCYTES BLD QL SMEAR: NORMAL
TIBC SERPL-MCNC: 84 UG/DL (ref 250–450)
TOXIC GRANULES BLD QL SMEAR: NORMAL
TOXIC GRANULES BLD QL SMEAR: SLIGHT
TRIGL SERPL-MCNC: 85 MG/DL (ref 0–149)
TROPONIN T SERPL-MCNC: 24 NG/L (ref 6–19)
UIBC SERPL-MCNC: 48 UG/DL (ref 110–370)
UNIT STATUS USTAT: NORMAL
UROBILINOGEN UR STRIP.AUTO-MCNC: 1 MG/DL
VARIANT LYMPHS BLD QL SMEAR: NORMAL
VIT B12 SERPL-MCNC: 3314 PG/ML (ref 211–911)
WBC # BLD AUTO: 2.2 K/UL (ref 4.8–10.8)
WBC # BLD AUTO: 2.3 K/UL (ref 4.8–10.8)
WBC # BLD AUTO: 2.3 K/UL (ref 4.8–10.8)
WBC # BLD AUTO: 2.4 K/UL (ref 4.8–10.8)
WBC # BLD AUTO: 2.5 K/UL (ref 4.8–10.8)
WBC # BLD AUTO: 2.7 K/UL (ref 4.8–10.8)
WBC # BLD AUTO: 2.8 K/UL (ref 4.8–10.8)
WBC # BLD AUTO: 2.8 K/UL (ref 4.8–10.8)
WBC # BLD AUTO: 3 K/UL (ref 4.8–10.8)
WBC # BLD AUTO: 3.1 K/UL (ref 4.8–10.8)
WBC # BLD AUTO: 3.1 K/UL (ref 4.8–10.8)
WBC # BLD AUTO: 4.1 K/UL (ref 4.8–10.8)
WBC # BLD AUTO: 4.4 K/UL (ref 4.8–10.8)
WBC # BLD AUTO: 5.9 K/UL (ref 4.8–10.8)
WBC # BLD AUTO: 9.4 K/UL (ref 4.8–10.8)
WBC # BLD AUTO: 9.7 K/UL (ref 4.8–10.8)
WBC #/AREA URNS HPF: ABNORMAL /HPF

## 2020-01-01 PROCEDURE — 700105 HCHG RX REV CODE 258: Performed by: NURSE PRACTITIONER

## 2020-01-01 PROCEDURE — C1751 CATH, INF, PER/CENT/MIDLINE: HCPCS

## 2020-01-01 PROCEDURE — 99233 SBSQ HOSP IP/OBS HIGH 50: CPT | Performed by: INTERNAL MEDICINE

## 2020-01-01 PROCEDURE — 302129 PCA PLUS: Performed by: INTERNAL MEDICINE

## 2020-01-01 PROCEDURE — 72197 MRI PELVIS W/O & W/DYE: CPT

## 2020-01-01 PROCEDURE — 77412 RADIATION TX DELIVERY LVL 3: CPT | Performed by: RADIOLOGY

## 2020-01-01 PROCEDURE — 700102 HCHG RX REV CODE 250 W/ 637 OVERRIDE(OP): Performed by: SURGERY

## 2020-01-01 PROCEDURE — 77300 RADIATION THERAPY DOSE PLAN: CPT | Mod: 26 | Performed by: RADIOLOGY

## 2020-01-01 PROCEDURE — 501433 HCHG STAPLER, GIA MULTIFIRE 60/80: Performed by: SURGERY

## 2020-01-01 PROCEDURE — 93970 EXTREMITY STUDY: CPT | Mod: 26 | Performed by: INTERNAL MEDICINE

## 2020-01-01 PROCEDURE — 302146: Performed by: SURGERY

## 2020-01-01 PROCEDURE — 700105 HCHG RX REV CODE 258: Performed by: INTERNAL MEDICINE

## 2020-01-01 PROCEDURE — 302098 PASTE RING (FLAT): Performed by: SURGERY

## 2020-01-01 PROCEDURE — 83735 ASSAY OF MAGNESIUM: CPT

## 2020-01-01 PROCEDURE — 93005 ELECTROCARDIOGRAM TRACING: CPT | Performed by: SURGERY

## 2020-01-01 PROCEDURE — 93970 EXTREMITY STUDY: CPT

## 2020-01-01 PROCEDURE — 700111 HCHG RX REV CODE 636 W/ 250 OVERRIDE (IP): Performed by: INTERNAL MEDICINE

## 2020-01-01 PROCEDURE — 770022 HCHG ROOM/CARE - ICU (200)

## 2020-01-01 PROCEDURE — 77387 GUIDANCE FOR RADJ TX DLVR: CPT | Performed by: RADIOLOGY

## 2020-01-01 PROCEDURE — 700111 HCHG RX REV CODE 636 W/ 250 OVERRIDE (IP): Performed by: HOSPITALIST

## 2020-01-01 PROCEDURE — 0DQA0ZZ REPAIR JEJUNUM, OPEN APPROACH: ICD-10-PCS | Performed by: SURGERY

## 2020-01-01 PROCEDURE — 96361 HYDRATE IV INFUSION ADD-ON: CPT

## 2020-01-01 PROCEDURE — 71045 X-RAY EXAM CHEST 1 VIEW: CPT

## 2020-01-01 PROCEDURE — 84132 ASSAY OF SERUM POTASSIUM: CPT

## 2020-01-01 PROCEDURE — 99291 CRITICAL CARE FIRST HOUR: CPT | Performed by: SURGERY

## 2020-01-01 PROCEDURE — 99231 SBSQ HOSP IP/OBS SF/LOW 25: CPT | Performed by: HOSPITALIST

## 2020-01-01 PROCEDURE — 80048 BASIC METABOLIC PNL TOTAL CA: CPT

## 2020-01-01 PROCEDURE — 77014 PR CT GUIDANCE PLACEMENT RAD THERAPY FIELDS: CPT | Mod: 26 | Performed by: RADIOLOGY

## 2020-01-01 PROCEDURE — 700111 HCHG RX REV CODE 636 W/ 250 OVERRIDE (IP): Performed by: SURGERY

## 2020-01-01 PROCEDURE — 77334 RADIATION TREATMENT AID(S): CPT | Performed by: RADIOLOGY

## 2020-01-01 PROCEDURE — 501448 HCHG STAPLER, TA 45 4.8: Performed by: SURGERY

## 2020-01-01 PROCEDURE — 94760 N-INVAS EAR/PLS OXIMETRY 1: CPT

## 2020-01-01 PROCEDURE — 306802 SYSTEM FECAL MANAGEMENT CATHETER KIT FLEXI - SEAL: Performed by: SURGERY

## 2020-01-01 PROCEDURE — 82607 VITAMIN B-12: CPT

## 2020-01-01 PROCEDURE — 99232 SBSQ HOSP IP/OBS MODERATE 35: CPT | Performed by: RADIOLOGY

## 2020-01-01 PROCEDURE — 99205 OFFICE O/P NEW HI 60 MIN: CPT | Performed by: RADIOLOGY

## 2020-01-01 PROCEDURE — B548ZZA ULTRASONOGRAPHY OF SUPERIOR VENA CAVA, GUIDANCE: ICD-10-PCS | Performed by: EMERGENCY MEDICINE

## 2020-01-01 PROCEDURE — 700111 HCHG RX REV CODE 636 W/ 250 OVERRIDE (IP): Performed by: FAMILY MEDICINE

## 2020-01-01 PROCEDURE — A9270 NON-COVERED ITEM OR SERVICE: HCPCS | Performed by: INTERNAL MEDICINE

## 2020-01-01 PROCEDURE — 80053 COMPREHEN METABOLIC PANEL: CPT

## 2020-01-01 PROCEDURE — 93005 ELECTROCARDIOGRAM TRACING: CPT | Performed by: EMERGENCY MEDICINE

## 2020-01-01 PROCEDURE — C9113 INJ PANTOPRAZOLE SODIUM, VIA: HCPCS | Performed by: SURGERY

## 2020-01-01 PROCEDURE — 700101 HCHG RX REV CODE 250: Performed by: INTERNAL MEDICINE

## 2020-01-01 PROCEDURE — 97530 THERAPEUTIC ACTIVITIES: CPT

## 2020-01-01 PROCEDURE — A9270 NON-COVERED ITEM OR SERVICE: HCPCS | Performed by: HOSPITALIST

## 2020-01-01 PROCEDURE — 700101 HCHG RX REV CODE 250: Performed by: HOSPITALIST

## 2020-01-01 PROCEDURE — 85007 BL SMEAR W/DIFF WBC COUNT: CPT

## 2020-01-01 PROCEDURE — T2045 HOSPICE GENERAL CARE: HCPCS

## 2020-01-01 PROCEDURE — 770001 HCHG ROOM/CARE - MED/SURG/GYN PRIV*

## 2020-01-01 PROCEDURE — A9576 INJ PROHANCE MULTIPACK: HCPCS | Performed by: SURGERY

## 2020-01-01 PROCEDURE — 700111 HCHG RX REV CODE 636 W/ 250 OVERRIDE (IP): Performed by: EMERGENCY MEDICINE

## 2020-01-01 PROCEDURE — 83605 ASSAY OF LACTIC ACID: CPT

## 2020-01-01 PROCEDURE — 85027 COMPLETE CBC AUTOMATED: CPT

## 2020-01-01 PROCEDURE — 160029 HCHG SURGERY MINUTES - 1ST 30 MINS LEVEL 4: Performed by: SURGERY

## 2020-01-01 PROCEDURE — 160041 HCHG SURGERY MINUTES - EA ADDL 1 MIN LEVEL 4: Performed by: SURGERY

## 2020-01-01 PROCEDURE — 94150 VITAL CAPACITY TEST: CPT

## 2020-01-01 PROCEDURE — 77336 RADIATION PHYSICS CONSULT: CPT | Performed by: RADIOLOGY

## 2020-01-01 PROCEDURE — 700101 HCHG RX REV CODE 250: Performed by: NURSE PRACTITIONER

## 2020-01-01 PROCEDURE — 700105 HCHG RX REV CODE 258: Performed by: SURGERY

## 2020-01-01 PROCEDURE — 71260 CT THORAX DX C+: CPT

## 2020-01-01 PROCEDURE — 84100 ASSAY OF PHOSPHORUS: CPT

## 2020-01-01 PROCEDURE — 77300 RADIATION THERAPY DOSE PLAN: CPT | Performed by: RADIOLOGY

## 2020-01-01 PROCEDURE — 77470 SPECIAL RADIATION TREATMENT: CPT | Mod: 26 | Performed by: RADIOLOGY

## 2020-01-01 PROCEDURE — 84478 ASSAY OF TRIGLYCERIDES: CPT

## 2020-01-01 PROCEDURE — 700101 HCHG RX REV CODE 250: Performed by: ANESTHESIOLOGY

## 2020-01-01 PROCEDURE — 94669 MECHANICAL CHEST WALL OSCILL: CPT

## 2020-01-01 PROCEDURE — 74176 CT ABD & PELVIS W/O CONTRAST: CPT

## 2020-01-01 PROCEDURE — 86901 BLOOD TYPING SEROLOGIC RH(D): CPT

## 2020-01-01 PROCEDURE — 77280 THER RAD SIMULAJ FIELD SMPL: CPT | Mod: 26 | Performed by: RADIOLOGY

## 2020-01-01 PROCEDURE — 87205 SMEAR GRAM STAIN: CPT

## 2020-01-01 PROCEDURE — 99232 SBSQ HOSP IP/OBS MODERATE 35: CPT | Performed by: HOSPITALIST

## 2020-01-01 PROCEDURE — 700102 HCHG RX REV CODE 250 W/ 637 OVERRIDE(OP): Performed by: HOSPITALIST

## 2020-01-01 PROCEDURE — 700105 HCHG RX REV CODE 258

## 2020-01-01 PROCEDURE — 160048 HCHG OR STATISTICAL LEVEL 1-5: Performed by: SURGERY

## 2020-01-01 PROCEDURE — 87086 URINE CULTURE/COLONY COUNT: CPT

## 2020-01-01 PROCEDURE — A9270 NON-COVERED ITEM OR SERVICE: HCPCS | Performed by: SURGERY

## 2020-01-01 PROCEDURE — 03HY32Z INSERTION OF MONITORING DEVICE INTO UPPER ARTERY, PERCUTANEOUS APPROACH: ICD-10-PCS | Performed by: ANESTHESIOLOGY

## 2020-01-01 PROCEDURE — 86900 BLOOD TYPING SEROLOGIC ABO: CPT

## 2020-01-01 PROCEDURE — 92526 ORAL FUNCTION THERAPY: CPT

## 2020-01-01 PROCEDURE — 700102 HCHG RX REV CODE 250 W/ 637 OVERRIDE(OP): Performed by: FAMILY MEDICINE

## 2020-01-01 PROCEDURE — 97535 SELF CARE MNGMENT TRAINING: CPT

## 2020-01-01 PROCEDURE — G0299 HHS/HOSPICE OF RN EA 15 MIN: HCPCS

## 2020-01-01 PROCEDURE — 99292 CRITICAL CARE ADDL 30 MIN: CPT | Performed by: SURGERY

## 2020-01-01 PROCEDURE — 74018 RADEX ABDOMEN 1 VIEW: CPT

## 2020-01-01 PROCEDURE — 84132 ASSAY OF SERUM POTASSIUM: CPT | Mod: 91

## 2020-01-01 PROCEDURE — 99233 SBSQ HOSP IP/OBS HIGH 50: CPT | Performed by: HOSPITALIST

## 2020-01-01 PROCEDURE — 84295 ASSAY OF SERUM SODIUM: CPT

## 2020-01-01 PROCEDURE — 501455: Performed by: SURGERY

## 2020-01-01 PROCEDURE — 0D180Z8 BYPASS SMALL INTESTINE TO SMALL INTESTINE, OPEN APPROACH: ICD-10-PCS | Performed by: SURGERY

## 2020-01-01 PROCEDURE — 700101 HCHG RX REV CODE 250: Performed by: SURGERY

## 2020-01-01 PROCEDURE — 77290 THER RAD SIMULAJ FIELD CPLX: CPT | Performed by: RADIOLOGY

## 2020-01-01 PROCEDURE — 700105 HCHG RX REV CODE 258: Performed by: HOSPITALIST

## 2020-01-01 PROCEDURE — 36556 INSERT NON-TUNNEL CV CATH: CPT

## 2020-01-01 PROCEDURE — 85014 HEMATOCRIT: CPT

## 2020-01-01 PROCEDURE — 501838 HCHG SUTURE GENERAL: Performed by: SURGERY

## 2020-01-01 PROCEDURE — 770021 HCHG ROOM/CARE - ISO PRIVATE

## 2020-01-01 PROCEDURE — 96375 TX/PRO/DX INJ NEW DRUG ADDON: CPT

## 2020-01-01 PROCEDURE — 93005 ELECTROCARDIOGRAM TRACING: CPT | Performed by: PHYSICAL MEDICINE & REHABILITATION

## 2020-01-01 PROCEDURE — 80053 COMPREHEN METABOLIC PANEL: CPT | Mod: QW

## 2020-01-01 PROCEDURE — 5A1935Z RESPIRATORY VENTILATION, LESS THAN 24 CONSECUTIVE HOURS: ICD-10-PCS | Performed by: SURGERY

## 2020-01-01 PROCEDURE — 80048 BASIC METABOLIC PNL TOTAL CA: CPT | Mod: 91

## 2020-01-01 PROCEDURE — 77427 RADIATION TX MANAGEMENT X5: CPT | Performed by: RADIOLOGY

## 2020-01-01 PROCEDURE — 500424 HCHG DRESSING, AIRSTRIP: Performed by: SURGERY

## 2020-01-01 PROCEDURE — 96365 THER/PROPH/DIAG IV INF INIT: CPT

## 2020-01-01 PROCEDURE — 700111 HCHG RX REV CODE 636 W/ 250 OVERRIDE (IP): Performed by: ANESTHESIOLOGY

## 2020-01-01 PROCEDURE — 87077 CULTURE AEROBIC IDENTIFY: CPT | Mod: 91

## 2020-01-01 PROCEDURE — 86850 RBC ANTIBODY SCREEN: CPT

## 2020-01-01 PROCEDURE — 700101 HCHG RX REV CODE 250: Performed by: EMERGENCY MEDICINE

## 2020-01-01 PROCEDURE — 97165 OT EVAL LOW COMPLEX 30 MIN: CPT

## 2020-01-01 PROCEDURE — 85730 THROMBOPLASTIN TIME PARTIAL: CPT

## 2020-01-01 PROCEDURE — 303105 HCHG CATHETER EXTRA

## 2020-01-01 PROCEDURE — G0156 HHCP-SVS OF AIDE,EA 15 MIN: HCPCS

## 2020-01-01 PROCEDURE — 82947 ASSAY GLUCOSE BLOOD QUANT: CPT

## 2020-01-01 PROCEDURE — 96366 THER/PROPH/DIAG IV INF ADDON: CPT

## 2020-01-01 PROCEDURE — 700105 HCHG RX REV CODE 258: Performed by: EMERGENCY MEDICINE

## 2020-01-01 PROCEDURE — 02HV33Z INSERTION OF INFUSION DEVICE INTO SUPERIOR VENA CAVA, PERCUTANEOUS APPROACH: ICD-10-PCS | Performed by: EMERGENCY MEDICINE

## 2020-01-01 PROCEDURE — 99291 CRITICAL CARE FIRST HOUR: CPT | Mod: 25 | Performed by: SURGERY

## 2020-01-01 PROCEDURE — 77263 THER RADIOLOGY TX PLNG CPLX: CPT | Performed by: RADIOLOGY

## 2020-01-01 PROCEDURE — 81001 URINALYSIS AUTO W/SCOPE: CPT

## 2020-01-01 PROCEDURE — 700102 HCHG RX REV CODE 250 W/ 637 OVERRIDE(OP): Performed by: INTERNAL MEDICINE

## 2020-01-01 PROCEDURE — 700111 HCHG RX REV CODE 636 W/ 250 OVERRIDE (IP): Performed by: RADIOLOGY

## 2020-01-01 PROCEDURE — 82330 ASSAY OF CALCIUM: CPT

## 2020-01-01 PROCEDURE — A9576 INJ PROHANCE MULTIPACK: HCPCS | Performed by: RADIOLOGY

## 2020-01-01 PROCEDURE — 87493 C DIFF AMPLIFIED PROBE: CPT

## 2020-01-01 PROCEDURE — 36415 COLL VENOUS BLD VENIPUNCTURE: CPT

## 2020-01-01 PROCEDURE — 97112 NEUROMUSCULAR REEDUCATION: CPT

## 2020-01-01 PROCEDURE — 99214 OFFICE O/P EST MOD 30 MIN: CPT

## 2020-01-01 PROCEDURE — 85007 BL SMEAR W/DIFF WBC COUNT: CPT | Mod: 91

## 2020-01-01 PROCEDURE — 83550 IRON BINDING TEST: CPT

## 2020-01-01 PROCEDURE — 85046 RETICYTE/HGB CONCENTRATE: CPT

## 2020-01-01 PROCEDURE — 96360 HYDRATION IV INFUSION INIT: CPT

## 2020-01-01 PROCEDURE — 94003 VENT MGMT INPAT SUBQ DAY: CPT

## 2020-01-01 PROCEDURE — 302101 FENESTRATED FOAM: Performed by: SURGERY

## 2020-01-01 PROCEDURE — 86140 C-REACTIVE PROTEIN: CPT

## 2020-01-01 PROCEDURE — 85025 COMPLETE CBC W/AUTO DIFF WBC: CPT

## 2020-01-01 PROCEDURE — 99232 SBSQ HOSP IP/OBS MODERATE 35: CPT | Performed by: INTERNAL MEDICINE

## 2020-01-01 PROCEDURE — 84134 ASSAY OF PREALBUMIN: CPT

## 2020-01-01 PROCEDURE — P9016 RBC LEUKOCYTES REDUCED: HCPCS

## 2020-01-01 PROCEDURE — 160009 HCHG ANES TIME/MIN: Performed by: SURGERY

## 2020-01-01 PROCEDURE — 307059 PAD,EAR PROTECTOR: Performed by: SURGERY

## 2020-01-01 PROCEDURE — 77280 THER RAD SIMULAJ FIELD SMPL: CPT | Performed by: RADIOLOGY

## 2020-01-01 PROCEDURE — 87040 BLOOD CULTURE FOR BACTERIA: CPT | Mod: 91

## 2020-01-01 PROCEDURE — 665036 HSPC NOTICE OF ELECTION NOE

## 2020-01-01 PROCEDURE — 302136 NUTRITION PUMP: Performed by: SURGERY

## 2020-01-01 PROCEDURE — 87070 CULTURE OTHR SPECIMN AEROBIC: CPT

## 2020-01-01 PROCEDURE — 82803 BLOOD GASES ANY COMBINATION: CPT

## 2020-01-01 PROCEDURE — 700117 HCHG RX CONTRAST REV CODE 255: Performed by: INTERNAL MEDICINE

## 2020-01-01 PROCEDURE — 30243N1 TRANSFUSION OF NONAUTOLOGOUS RED BLOOD CELLS INTO CENTRAL VEIN, PERCUTANEOUS APPROACH: ICD-10-PCS | Performed by: INTERNAL MEDICINE

## 2020-01-01 PROCEDURE — 700105 HCHG RX REV CODE 258: Performed by: ANESTHESIOLOGY

## 2020-01-01 PROCEDURE — 92610 EVALUATE SWALLOWING FUNCTION: CPT

## 2020-01-01 PROCEDURE — 700111 HCHG RX REV CODE 636 W/ 250 OVERRIDE (IP)

## 2020-01-01 PROCEDURE — A9270 NON-COVERED ITEM OR SERVICE: HCPCS | Performed by: FAMILY MEDICINE

## 2020-01-01 PROCEDURE — 501445 HCHG STAPLER, SKIN DISP: Performed by: SURGERY

## 2020-01-01 PROCEDURE — 93010 ELECTROCARDIOGRAM REPORT: CPT | Mod: GW | Performed by: INTERNAL MEDICINE

## 2020-01-01 PROCEDURE — 99291 CRITICAL CARE FIRST HOUR: CPT | Performed by: INTERNAL MEDICINE

## 2020-01-01 PROCEDURE — 84145 PROCALCITONIN (PCT): CPT

## 2020-01-01 PROCEDURE — 51702 INSERT TEMP BLADDER CATH: CPT

## 2020-01-01 PROCEDURE — 99291 CRITICAL CARE FIRST HOUR: CPT

## 2020-01-01 PROCEDURE — 94002 VENT MGMT INPAT INIT DAY: CPT

## 2020-01-01 PROCEDURE — 0DNB0ZZ RELEASE ILEUM, OPEN APPROACH: ICD-10-PCS | Performed by: SURGERY

## 2020-01-01 PROCEDURE — 77470 SPECIAL RADIATION TREATMENT: CPT | Performed by: RADIOLOGY

## 2020-01-01 PROCEDURE — 99233 SBSQ HOSP IP/OBS HIGH 50: CPT | Performed by: SURGERY

## 2020-01-01 PROCEDURE — P9045 ALBUMIN (HUMAN), 5%, 250 ML: HCPCS | Mod: JG | Performed by: ANESTHESIOLOGY

## 2020-01-01 PROCEDURE — 86923 COMPATIBILITY TEST ELECTRIC: CPT

## 2020-01-01 PROCEDURE — 77334 RADIATION TREATMENT AID(S): CPT | Mod: 26 | Performed by: RADIOLOGY

## 2020-01-01 PROCEDURE — 99223 1ST HOSP IP/OBS HIGH 75: CPT | Mod: AI | Performed by: INTERNAL MEDICINE

## 2020-01-01 PROCEDURE — 51798 US URINE CAPACITY MEASURE: CPT

## 2020-01-01 PROCEDURE — 99239 HOSP IP/OBS DSCHRG MGMT >30: CPT | Mod: GW | Performed by: INTERNAL MEDICINE

## 2020-01-01 PROCEDURE — 84484 ASSAY OF TROPONIN QUANT: CPT

## 2020-01-01 PROCEDURE — 87186 SC STD MICRODIL/AGAR DIL: CPT

## 2020-01-01 PROCEDURE — 99223 1ST HOSP IP/OBS HIGH 75: CPT | Performed by: PHYSICAL MEDICINE & REHABILITATION

## 2020-01-01 PROCEDURE — 74183 MRI ABD W/O CNTR FLWD CNTR: CPT

## 2020-01-01 PROCEDURE — 77295 3-D RADIOTHERAPY PLAN: CPT | Mod: 26 | Performed by: RADIOLOGY

## 2020-01-01 PROCEDURE — 700117 HCHG RX CONTRAST REV CODE 255: Performed by: RADIOLOGY

## 2020-01-01 PROCEDURE — 77290 THER RAD SIMULAJ FIELD CPLX: CPT | Mod: 26 | Performed by: RADIOLOGY

## 2020-01-01 PROCEDURE — 97163 PT EVAL HIGH COMPLEX 45 MIN: CPT

## 2020-01-01 PROCEDURE — 302106 OSTOMY POWDER: Performed by: SURGERY

## 2020-01-01 PROCEDURE — 77295 3-D RADIOTHERAPY PLAN: CPT | Performed by: RADIOLOGY

## 2020-01-01 PROCEDURE — 700117 HCHG RX CONTRAST REV CODE 255: Performed by: SURGERY

## 2020-01-01 PROCEDURE — 82746 ASSAY OF FOLIC ACID SERUM: CPT

## 2020-01-01 PROCEDURE — 99223 1ST HOSP IP/OBS HIGH 75: CPT | Mod: AI | Performed by: FAMILY MEDICINE

## 2020-01-01 PROCEDURE — 83690 ASSAY OF LIPASE: CPT

## 2020-01-01 PROCEDURE — 36430 TRANSFUSION BLD/BLD COMPNT: CPT

## 2020-01-01 PROCEDURE — 94770 HCHG CO2 EXPIRED GAS DETERMINATION: CPT

## 2020-01-01 PROCEDURE — 82378 CARCINOEMBRYONIC ANTIGEN: CPT

## 2020-01-01 PROCEDURE — 97110 THERAPEUTIC EXERCISES: CPT

## 2020-01-01 PROCEDURE — 83540 ASSAY OF IRON: CPT

## 2020-01-01 PROCEDURE — 87075 CULTR BACTERIA EXCEPT BLOOD: CPT

## 2020-01-01 PROCEDURE — 501452 HCHG STAPLES, GIA MULTIFIRE 60/80: Performed by: SURGERY

## 2020-01-01 PROCEDURE — 3E043XZ INTRODUCTION OF VASOPRESSOR INTO CENTRAL VEIN, PERCUTANEOUS APPROACH: ICD-10-PCS | Performed by: EMERGENCY MEDICINE

## 2020-01-01 RX ORDER — SODIUM CHLORIDE, SODIUM LACTATE, POTASSIUM CHLORIDE, CALCIUM CHLORIDE 600; 310; 30; 20 MG/100ML; MG/100ML; MG/100ML; MG/100ML
INJECTION, SOLUTION INTRAVENOUS CONTINUOUS
Status: DISCONTINUED | OUTPATIENT
Start: 2020-01-01 | End: 2020-01-01

## 2020-01-01 RX ORDER — POTASSIUM CHLORIDE 20 MEQ/1
40 TABLET, EXTENDED RELEASE ORAL 2 TIMES DAILY
Status: DISCONTINUED | OUTPATIENT
Start: 2020-01-01 | End: 2020-01-01

## 2020-01-01 RX ORDER — 0.9 % SODIUM CHLORIDE 0.9 %
10 VIAL (ML) INJECTION PRN
Status: CANCELLED | OUTPATIENT
Start: 2020-01-01

## 2020-01-01 RX ORDER — SCOLOPAMINE TRANSDERMAL SYSTEM 1 MG/1
1 PATCH, EXTENDED RELEASE TRANSDERMAL
Status: DISCONTINUED | OUTPATIENT
Start: 2020-01-01 | End: 2020-04-10 | Stop reason: HOSPADM

## 2020-01-01 RX ORDER — PROCHLORPERAZINE MALEATE 10 MG
10 TABLET ORAL EVERY 6 HOURS PRN
Status: ON HOLD | COMMUNITY
End: 2020-01-01

## 2020-01-01 RX ORDER — MAGNESIUM SULFATE HEPTAHYDRATE 40 MG/ML
2 INJECTION, SOLUTION INTRAVENOUS ONCE
Status: COMPLETED | OUTPATIENT
Start: 2020-01-01 | End: 2020-01-01

## 2020-01-01 RX ORDER — SODIUM CHLORIDE 9 MG/ML
1000 INJECTION, SOLUTION INTRAVENOUS ONCE
Status: COMPLETED | OUTPATIENT
Start: 2020-01-01 | End: 2020-01-01

## 2020-01-01 RX ORDER — LOPERAMIDE HCL 1 MG/7.5ML
4 SUSPENSION ORAL 4 TIMES DAILY PRN
Status: DISCONTINUED | OUTPATIENT
Start: 2020-01-01 | End: 2020-01-01 | Stop reason: HOSPADM

## 2020-01-01 RX ORDER — POTASSIUM CHLORIDE 14.9 MG/ML
20 INJECTION INTRAVENOUS ONCE
Status: COMPLETED | OUTPATIENT
Start: 2020-01-01 | End: 2020-01-01

## 2020-01-01 RX ORDER — ONDANSETRON 4 MG/1
8 TABLET, ORALLY DISINTEGRATING ORAL EVERY 8 HOURS PRN
Status: DISCONTINUED | OUTPATIENT
Start: 2020-01-01 | End: 2020-01-01 | Stop reason: HOSPADM

## 2020-01-01 RX ORDER — ONDANSETRON 8 MG/1
TABLET, ORALLY DISINTEGRATING ORAL
Status: ON HOLD | COMMUNITY
Start: 2020-01-01 | End: 2020-01-01

## 2020-01-01 RX ORDER — POTASSIUM CHLORIDE 7.45 MG/ML
10 INJECTION INTRAVENOUS ONCE
Status: COMPLETED | OUTPATIENT
Start: 2020-01-01 | End: 2020-01-01

## 2020-01-01 RX ORDER — ALBUMIN, HUMAN INJ 5% 5 %
SOLUTION INTRAVENOUS PRN
Status: DISCONTINUED | OUTPATIENT
Start: 2020-01-01 | End: 2020-01-01 | Stop reason: SURG

## 2020-01-01 RX ORDER — LOPERAMIDE HYDROCHLORIDE 2 MG/1
4 CAPSULE ORAL 4 TIMES DAILY
Status: DISCONTINUED | OUTPATIENT
Start: 2020-01-01 | End: 2020-01-01

## 2020-01-01 RX ORDER — LORAZEPAM 2 MG/ML
1 CONCENTRATE ORAL EVERY 4 HOURS PRN
Status: DISCONTINUED | OUTPATIENT
Start: 2020-01-01 | End: 2020-01-01 | Stop reason: HOSPADM

## 2020-01-01 RX ORDER — 0.9 % SODIUM CHLORIDE 0.9 %
3 VIAL (ML) INJECTION PRN
Status: CANCELLED | OUTPATIENT
Start: 2020-01-01

## 2020-01-01 RX ORDER — SODIUM CHLORIDE 9 MG/ML
1000 INJECTION, SOLUTION INTRAVENOUS ONCE
Status: CANCELLED | OUTPATIENT
Start: 2020-01-01

## 2020-01-01 RX ORDER — POTASSIUM CHLORIDE 20 MEQ/1
40 TABLET, EXTENDED RELEASE ORAL 2 TIMES DAILY
Status: COMPLETED | OUTPATIENT
Start: 2020-01-01 | End: 2020-01-01

## 2020-01-01 RX ORDER — MORPHINE SULFATE 10 MG/ML
5 INJECTION, SOLUTION INTRAMUSCULAR; INTRAVENOUS
Status: DISCONTINUED | OUTPATIENT
Start: 2020-01-01 | End: 2020-04-10 | Stop reason: HOSPADM

## 2020-01-01 RX ORDER — SODIUM CHLORIDE 9 MG/ML
INJECTION, SOLUTION INTRAVENOUS
Status: COMPLETED
Start: 2020-01-01 | End: 2020-01-01

## 2020-01-01 RX ORDER — ACETAMINOPHEN 650 MG/1
650 SUPPOSITORY RECTAL EVERY 4 HOURS PRN
Status: DISCONTINUED | OUTPATIENT
Start: 2020-01-01 | End: 2020-01-01

## 2020-01-01 RX ORDER — BISACODYL 10 MG
10 SUPPOSITORY, RECTAL RECTAL
Status: DISCONTINUED | OUTPATIENT
Start: 2020-01-01 | End: 2020-01-01 | Stop reason: SINTOL

## 2020-01-01 RX ORDER — ONDANSETRON 2 MG/ML
4 INJECTION INTRAMUSCULAR; INTRAVENOUS EVERY 4 HOURS PRN
Status: DISCONTINUED | OUTPATIENT
Start: 2020-01-01 | End: 2020-01-01 | Stop reason: HOSPADM

## 2020-01-01 RX ORDER — SODIUM CHLORIDE, SODIUM LACTATE, POTASSIUM CHLORIDE, CALCIUM CHLORIDE 600; 310; 30; 20 MG/100ML; MG/100ML; MG/100ML; MG/100ML
INJECTION, SOLUTION INTRAVENOUS
Status: DISCONTINUED | OUTPATIENT
Start: 2020-01-01 | End: 2020-01-01 | Stop reason: SURG

## 2020-01-01 RX ORDER — ONDANSETRON 2 MG/ML
8 INJECTION INTRAMUSCULAR; INTRAVENOUS EVERY 8 HOURS PRN
Status: DISCONTINUED | OUTPATIENT
Start: 2020-01-01 | End: 2020-01-01 | Stop reason: HOSPADM

## 2020-01-01 RX ORDER — LORAZEPAM 2 MG/ML
1 INJECTION INTRAMUSCULAR
Status: DISCONTINUED | OUTPATIENT
Start: 2020-01-01 | End: 2020-01-01

## 2020-01-01 RX ORDER — CARBOXYMETHYLCELLULOSE SODIUM 5 MG/ML
1 SOLUTION/ DROPS OPHTHALMIC PRN
Status: DISCONTINUED | OUTPATIENT
Start: 2020-01-01 | End: 2020-01-01 | Stop reason: HOSPADM

## 2020-01-01 RX ORDER — PANTOPRAZOLE SODIUM 40 MG/10ML
40 INJECTION, POWDER, LYOPHILIZED, FOR SOLUTION INTRAVENOUS DAILY
Status: DISCONTINUED | OUTPATIENT
Start: 2020-01-01 | End: 2020-01-01

## 2020-01-01 RX ORDER — ACETAMINOPHEN 650 MG/1
650 SUPPOSITORY RECTAL EVERY 4 HOURS PRN
Status: DISCONTINUED | OUTPATIENT
Start: 2020-01-01 | End: 2020-01-01 | Stop reason: HOSPADM

## 2020-01-01 RX ORDER — ONDANSETRON 2 MG/ML
4 INJECTION INTRAMUSCULAR; INTRAVENOUS EVERY 6 HOURS PRN
Status: DISCONTINUED | OUTPATIENT
Start: 2020-01-01 | End: 2020-04-10 | Stop reason: HOSPADM

## 2020-01-01 RX ORDER — ONDANSETRON 2 MG/ML
8 INJECTION INTRAMUSCULAR; INTRAVENOUS EVERY 8 HOURS PRN
Status: DISCONTINUED | OUTPATIENT
Start: 2020-01-01 | End: 2020-01-01

## 2020-01-01 RX ORDER — 0.9 % SODIUM CHLORIDE 0.9 %
VIAL (ML) INJECTION PRN
Status: CANCELLED | OUTPATIENT
Start: 2020-01-01

## 2020-01-01 RX ORDER — POLYETHYLENE GLYCOL 3350 17 G/17G
1 POWDER, FOR SOLUTION ORAL
Status: DISCONTINUED | OUTPATIENT
Start: 2020-01-01 | End: 2020-01-01 | Stop reason: SINTOL

## 2020-01-01 RX ORDER — MORPHINE SULFATE 100 MG/5ML
10 SOLUTION ORAL EVERY 4 HOURS PRN
Status: DISCONTINUED | OUTPATIENT
Start: 2020-01-01 | End: 2020-01-01

## 2020-01-01 RX ORDER — SODIUM CHLORIDE, SODIUM LACTATE, POTASSIUM CHLORIDE, AND CALCIUM CHLORIDE .6; .31; .03; .02 G/100ML; G/100ML; G/100ML; G/100ML
30 INJECTION, SOLUTION INTRAVENOUS
Status: COMPLETED | OUTPATIENT
Start: 2020-01-01 | End: 2020-01-01

## 2020-01-01 RX ORDER — AMOXICILLIN 250 MG
2 CAPSULE ORAL 2 TIMES DAILY
Status: DISCONTINUED | OUTPATIENT
Start: 2020-01-01 | End: 2020-01-01

## 2020-01-01 RX ORDER — BISACODYL 10 MG
10 SUPPOSITORY, RECTAL RECTAL
Status: DISCONTINUED | OUTPATIENT
Start: 2020-01-01 | End: 2020-01-01

## 2020-01-01 RX ORDER — ACETAMINOPHEN 325 MG/1
650 TABLET ORAL EVERY 4 HOURS PRN
COMMUNITY

## 2020-01-01 RX ORDER — ONDANSETRON 4 MG/1
4 TABLET, ORALLY DISINTEGRATING ORAL EVERY 4 HOURS PRN
Status: DISCONTINUED | OUTPATIENT
Start: 2020-01-01 | End: 2020-01-01 | Stop reason: HOSPADM

## 2020-01-01 RX ORDER — ONDANSETRON 4 MG/1
8 TABLET, ORALLY DISINTEGRATING ORAL EVERY 8 HOURS PRN
Status: DISCONTINUED | OUTPATIENT
Start: 2020-01-01 | End: 2020-01-01

## 2020-01-01 RX ORDER — SODIUM CHLORIDE, SODIUM LACTATE, POTASSIUM CHLORIDE, CALCIUM CHLORIDE 600; 310; 30; 20 MG/100ML; MG/100ML; MG/100ML; MG/100ML
1000 INJECTION, SOLUTION INTRAVENOUS ONCE
Status: COMPLETED | OUTPATIENT
Start: 2020-01-01 | End: 2020-01-01

## 2020-01-01 RX ORDER — DEXTROSE AND SODIUM CHLORIDE 5; .45 G/100ML; G/100ML
INJECTION, SOLUTION INTRAVENOUS CONTINUOUS
Status: DISCONTINUED | OUTPATIENT
Start: 2020-01-01 | End: 2020-01-01

## 2020-01-01 RX ORDER — DEXTROSE MONOHYDRATE 50 MG/ML
INJECTION, SOLUTION INTRAVENOUS CONTINUOUS
Status: DISCONTINUED | OUTPATIENT
Start: 2020-01-01 | End: 2020-01-01

## 2020-01-01 RX ORDER — IBUPROFEN 200 MG
200 TABLET ORAL EVERY 6 HOURS PRN
COMMUNITY
End: 2020-01-01

## 2020-01-01 RX ORDER — MORPHINE SULFATE 4 MG/ML
1-5 INJECTION, SOLUTION INTRAMUSCULAR; INTRAVENOUS
Status: DISCONTINUED | OUTPATIENT
Start: 2020-01-01 | End: 2020-01-01 | Stop reason: HOSPADM

## 2020-01-01 RX ORDER — MORPHINE SULFATE 10 MG/ML
1-5 INJECTION, SOLUTION INTRAMUSCULAR; INTRAVENOUS
Status: DISCONTINUED | OUTPATIENT
Start: 2020-01-01 | End: 2020-01-01

## 2020-01-01 RX ORDER — BACITRACIN ZINC 500 [USP'U]/G
OINTMENT TOPICAL PRN
Status: DISCONTINUED | OUTPATIENT
Start: 2020-01-01 | End: 2020-01-01

## 2020-01-01 RX ORDER — LORAZEPAM 2 MG/ML
1 INJECTION INTRAMUSCULAR EVERY 4 HOURS PRN
Status: DISCONTINUED | OUTPATIENT
Start: 2020-01-01 | End: 2020-01-01 | Stop reason: HOSPADM

## 2020-01-01 RX ORDER — SCOLOPAMINE TRANSDERMAL SYSTEM 1 MG/1
1 PATCH, EXTENDED RELEASE TRANSDERMAL
Status: DISCONTINUED | OUTPATIENT
Start: 2020-01-01 | End: 2020-01-01 | Stop reason: HOSPADM

## 2020-01-01 RX ORDER — MORPHINE SULFATE 100 MG/5ML
5 SOLUTION ORAL EVERY 4 HOURS PRN
Status: DISCONTINUED | OUTPATIENT
Start: 2020-01-01 | End: 2020-01-01

## 2020-01-01 RX ORDER — MORPHINE SULFATE 10 MG/ML
10 INJECTION, SOLUTION INTRAMUSCULAR; INTRAVENOUS
Status: DISCONTINUED | OUTPATIENT
Start: 2020-01-01 | End: 2020-04-10 | Stop reason: HOSPADM

## 2020-01-01 RX ORDER — SUCCINYLCHOLINE CHLORIDE 20 MG/ML
INJECTION INTRAMUSCULAR; INTRAVENOUS PRN
Status: DISCONTINUED | OUTPATIENT
Start: 2020-01-01 | End: 2020-01-01 | Stop reason: SURG

## 2020-01-01 RX ORDER — MORPHINE SULFATE 100 MG/5ML
10 SOLUTION ORAL
Status: DISCONTINUED | OUTPATIENT
Start: 2020-01-01 | End: 2020-04-10 | Stop reason: HOSPADM

## 2020-01-01 RX ORDER — SODIUM CHLORIDE 9 MG/ML
INJECTION, SOLUTION INTRAVENOUS
Status: ACTIVE
Start: 2020-01-01 | End: 2020-01-01

## 2020-01-01 RX ORDER — POLYETHYLENE GLYCOL 3350 17 G/17G
1 POWDER, FOR SOLUTION ORAL
Status: DISCONTINUED | OUTPATIENT
Start: 2020-01-01 | End: 2020-01-01

## 2020-01-01 RX ORDER — LORAZEPAM 2 MG/ML
1 CONCENTRATE ORAL EVERY 4 HOURS PRN
Status: DISCONTINUED | OUTPATIENT
Start: 2020-01-01 | End: 2020-04-10 | Stop reason: HOSPADM

## 2020-01-01 RX ORDER — LORAZEPAM 2 MG/ML
1 CONCENTRATE ORAL EVERY 4 HOURS PRN
Status: DISCONTINUED | OUTPATIENT
Start: 2020-01-01 | End: 2020-01-01

## 2020-01-01 RX ORDER — OXYCODONE HYDROCHLORIDE 10 MG/1
10 TABLET ORAL EVERY 4 HOURS PRN
Status: DISCONTINUED | OUTPATIENT
Start: 2020-01-01 | End: 2020-01-01 | Stop reason: HOSPADM

## 2020-01-01 RX ORDER — LOPERAMIDE HYDROCHLORIDE 2 MG/1
2 CAPSULE ORAL 4 TIMES DAILY
Status: DISCONTINUED | OUTPATIENT
Start: 2020-01-01 | End: 2020-01-01

## 2020-01-01 RX ORDER — PHENYLEPHRINE HCL IN 0.9% NACL 0.5 MG/5ML
SYRINGE (ML) INTRAVENOUS PRN
Status: DISCONTINUED | OUTPATIENT
Start: 2020-01-01 | End: 2020-01-01 | Stop reason: SURG

## 2020-01-01 RX ORDER — POTASSIUM CHLORIDE 7.45 MG/ML
10 INJECTION INTRAVENOUS
Status: COMPLETED | OUTPATIENT
Start: 2020-01-01 | End: 2020-01-01

## 2020-01-01 RX ORDER — SODIUM CHLORIDE, SODIUM GLUCONATE, SODIUM ACETATE, POTASSIUM CHLORIDE AND MAGNESIUM CHLORIDE 526; 502; 368; 37; 30 MG/100ML; MG/100ML; MG/100ML; MG/100ML; MG/100ML
INJECTION, SOLUTION INTRAVENOUS
Status: DISCONTINUED | OUTPATIENT
Start: 2020-01-01 | End: 2020-01-01 | Stop reason: SURG

## 2020-01-01 RX ORDER — SODIUM CHLORIDE AND POTASSIUM CHLORIDE 300; 900 MG/100ML; MG/100ML
INJECTION, SOLUTION INTRAVENOUS ONCE
Status: COMPLETED | OUTPATIENT
Start: 2020-01-01 | End: 2020-01-01

## 2020-01-01 RX ORDER — OCTREOTIDE ACETATE 100 UG/ML
100 INJECTION, SOLUTION INTRAVENOUS; SUBCUTANEOUS 3 TIMES DAILY
Status: DISCONTINUED | OUTPATIENT
Start: 2020-01-01 | End: 2020-01-01

## 2020-01-01 RX ORDER — SODIUM CHLORIDE, SODIUM LACTATE, POTASSIUM CHLORIDE, AND CALCIUM CHLORIDE .6; .31; .03; .02 G/100ML; G/100ML; G/100ML; G/100ML
500 INJECTION, SOLUTION INTRAVENOUS ONCE
Status: COMPLETED | OUTPATIENT
Start: 2020-01-01 | End: 2020-01-01

## 2020-01-01 RX ORDER — ACETAMINOPHEN 325 MG/1
650 TABLET ORAL EVERY 4 HOURS PRN
Status: DISCONTINUED | OUTPATIENT
Start: 2020-01-01 | End: 2020-01-01

## 2020-01-01 RX ORDER — ATROPINE SULFATE 10 MG/ML
2 SOLUTION/ DROPS OPHTHALMIC EVERY 4 HOURS PRN
Status: DISCONTINUED | OUTPATIENT
Start: 2020-01-01 | End: 2020-01-01 | Stop reason: HOSPADM

## 2020-01-01 RX ORDER — ONDANSETRON 2 MG/ML
INJECTION INTRAMUSCULAR; INTRAVENOUS
Status: COMPLETED
Start: 2020-01-01 | End: 2020-01-01

## 2020-01-01 RX ORDER — ATROPINE SULFATE 10 MG/ML
2 SOLUTION/ DROPS OPHTHALMIC
Status: DISCONTINUED | OUTPATIENT
Start: 2020-01-01 | End: 2020-04-10 | Stop reason: HOSPADM

## 2020-01-01 RX ORDER — LOPERAMIDE HCL 1 MG/7.5ML
4 SUSPENSION ORAL 4 TIMES DAILY
Status: DISCONTINUED | OUTPATIENT
Start: 2020-01-01 | End: 2020-01-01

## 2020-01-01 RX ORDER — ONDANSETRON 4 MG/1
4 TABLET, ORALLY DISINTEGRATING ORAL EVERY 4 HOURS PRN
Status: DISCONTINUED | OUTPATIENT
Start: 2020-01-01 | End: 2020-01-01

## 2020-01-01 RX ORDER — SODIUM CHLORIDE 9 MG/ML
INJECTION, SOLUTION INTRAVENOUS CONTINUOUS
Status: ACTIVE | OUTPATIENT
Start: 2020-01-01 | End: 2020-01-01

## 2020-01-01 RX ORDER — SODIUM CHLORIDE AND POTASSIUM CHLORIDE 300; 900 MG/100ML; MG/100ML
INJECTION, SOLUTION INTRAVENOUS ONCE
Status: CANCELLED
Start: 2020-01-01

## 2020-01-01 RX ORDER — POLYVINYL ALCOHOL 14 MG/ML
2 SOLUTION/ DROPS OPHTHALMIC
Status: DISCONTINUED | OUTPATIENT
Start: 2020-01-01 | End: 2020-04-10 | Stop reason: HOSPADM

## 2020-01-01 RX ORDER — OXYCODONE HYDROCHLORIDE 5 MG/1
5 TABLET ORAL EVERY 4 HOURS PRN
Status: DISCONTINUED | OUTPATIENT
Start: 2020-01-01 | End: 2020-01-01 | Stop reason: HOSPADM

## 2020-01-01 RX ORDER — MORPHINE SULFATE 10 MG/ML
1-5 INJECTION, SOLUTION INTRAMUSCULAR; INTRAVENOUS EVERY 4 HOURS PRN
Status: DISCONTINUED | OUTPATIENT
Start: 2020-01-01 | End: 2020-01-01

## 2020-01-01 RX ORDER — ACETAMINOPHEN 325 MG/1
650 TABLET ORAL EVERY 4 HOURS PRN
Status: DISCONTINUED | OUTPATIENT
Start: 2020-01-01 | End: 2020-01-01 | Stop reason: HOSPADM

## 2020-01-01 RX ORDER — METOCLOPRAMIDE HYDROCHLORIDE 5 MG/ML
10 INJECTION INTRAMUSCULAR; INTRAVENOUS EVERY 6 HOURS
Status: DISCONTINUED | OUTPATIENT
Start: 2020-01-01 | End: 2020-01-01

## 2020-01-01 RX ORDER — LORAZEPAM 2 MG/ML
1 INJECTION INTRAMUSCULAR EVERY 4 HOURS PRN
Status: DISCONTINUED | OUTPATIENT
Start: 2020-01-01 | End: 2020-04-10 | Stop reason: HOSPADM

## 2020-01-01 RX ORDER — CAPECITABINE 500 MG/1
TABLET, FILM COATED ORAL
COMMUNITY
Start: 2020-01-01 | End: 2020-01-01

## 2020-01-01 RX ORDER — LOPERAMIDE HYDROCHLORIDE 2 MG/1
2 CAPSULE ORAL 4 TIMES DAILY PRN
Status: DISCONTINUED | OUTPATIENT
Start: 2020-01-01 | End: 2020-01-01

## 2020-01-01 RX ORDER — MORPHINE SULFATE 100 MG/5ML
5 SOLUTION ORAL EVERY 4 HOURS PRN
Status: DISCONTINUED | OUTPATIENT
Start: 2020-01-01 | End: 2020-01-01 | Stop reason: HOSPADM

## 2020-01-01 RX ORDER — POTASSIUM CHLORIDE 29.8 MG/ML
40 INJECTION INTRAVENOUS ONCE
Status: COMPLETED | OUTPATIENT
Start: 2020-01-01 | End: 2020-01-01

## 2020-01-01 RX ORDER — LOPERAMIDE HYDROCHLORIDE 2 MG/1
2 CAPSULE ORAL 4 TIMES DAILY PRN
COMMUNITY
End: 2020-01-01

## 2020-01-01 RX ORDER — AMOXICILLIN 250 MG
2 CAPSULE ORAL 2 TIMES DAILY
Status: DISCONTINUED | OUTPATIENT
Start: 2020-01-01 | End: 2020-01-01 | Stop reason: SINTOL

## 2020-01-01 RX ORDER — ONDANSETRON 4 MG/1
4 TABLET, ORALLY DISINTEGRATING ORAL EVERY 6 HOURS PRN
Status: DISCONTINUED | OUTPATIENT
Start: 2020-01-01 | End: 2020-04-10 | Stop reason: HOSPADM

## 2020-01-01 RX ADMIN — OXYCODONE HYDROCHLORIDE 5 MG: 5 TABLET ORAL at 06:06

## 2020-01-01 RX ADMIN — OMEPRAZOLE 40 MG: KIT at 05:22

## 2020-01-01 RX ADMIN — CARBOXYMETHYLCELLULOSE SODIUM 1 DROP: 5 SOLUTION/ DROPS OPHTHALMIC at 22:47

## 2020-01-01 RX ADMIN — METOCLOPRAMIDE 10 MG: 5 INJECTION, SOLUTION INTRAMUSCULAR; INTRAVENOUS at 01:30

## 2020-01-01 RX ADMIN — POTASSIUM BICARBONATE 25 MEQ: 978 TABLET, EFFERVESCENT ORAL at 12:05

## 2020-01-01 RX ADMIN — SODIUM CHLORIDE, SODIUM GLUCONATE, SODIUM ACETATE, POTASSIUM CHLORIDE AND MAGNESIUM CHLORIDE: 526; 502; 368; 37; 30 INJECTION, SOLUTION INTRAVENOUS at 15:00

## 2020-01-01 RX ADMIN — MORPHINE SULFATE 5 MG: 4 INJECTION INTRAVENOUS at 13:39

## 2020-01-01 RX ADMIN — POTASSIUM CHLORIDE 20 MEQ: 14.9 INJECTION, SOLUTION INTRAVENOUS at 08:23

## 2020-01-01 RX ADMIN — CARBOXYMETHYLCELLULOSE SODIUM 1 DROP: 5 SOLUTION/ DROPS OPHTHALMIC at 12:02

## 2020-01-01 RX ADMIN — PANTOPRAZOLE SODIUM 40 MG: 40 INJECTION, POWDER, LYOPHILIZED, FOR SOLUTION INTRAVENOUS at 05:47

## 2020-01-01 RX ADMIN — PANTOPRAZOLE SODIUM 40 MG: 40 INJECTION, POWDER, LYOPHILIZED, FOR SOLUTION INTRAVENOUS at 05:48

## 2020-01-01 RX ADMIN — VASOPRESSIN 0.03 UNITS/MIN: 20 INJECTION INTRAVENOUS at 07:08

## 2020-01-01 RX ADMIN — PIPERACILLIN AND TAZOBACTAM 4.5 G: 4; .5 INJECTION, POWDER, LYOPHILIZED, FOR SOLUTION INTRAVENOUS; PARENTERAL at 20:24

## 2020-01-01 RX ADMIN — MORPHINE SULFATE 5 MG: 10 INJECTION INTRAVENOUS at 07:43

## 2020-01-01 RX ADMIN — Medication 200 MCG: at 14:15

## 2020-01-01 RX ADMIN — CALCIUM GLUCONATE 1 G: 98 INJECTION, SOLUTION INTRAVENOUS at 11:53

## 2020-01-01 RX ADMIN — ALBUMIN (HUMAN) 250 ML: 2.5 SOLUTION INTRAVENOUS at 14:40

## 2020-01-01 RX ADMIN — MORPHINE SULFATE 4 MG: 4 INJECTION INTRAVENOUS at 11:43

## 2020-01-01 RX ADMIN — POTASSIUM CHLORIDE 10 MEQ: 7.46 INJECTION, SOLUTION INTRAVENOUS at 06:14

## 2020-01-01 RX ADMIN — BACITRACIN ZINC: 500 OINTMENT TOPICAL at 06:37

## 2020-01-01 RX ADMIN — NOREPINEPHRINE BITARTRATE 10 MCG/MIN: 1 INJECTION INTRAVENOUS at 11:18

## 2020-01-01 RX ADMIN — LOPERAMIDE HYDROCHLORIDE 4 MG: 2 CAPSULE ORAL at 13:45

## 2020-01-01 RX ADMIN — PIPERACILLIN AND TAZOBACTAM 4.5 G: 4; .5 INJECTION, POWDER, LYOPHILIZED, FOR SOLUTION INTRAVENOUS; PARENTERAL at 20:54

## 2020-01-01 RX ADMIN — POTASSIUM CHLORIDE 10 MEQ: 7.46 INJECTION, SOLUTION INTRAVENOUS at 08:25

## 2020-01-01 RX ADMIN — CARBOXYMETHYLCELLULOSE SODIUM 1 DROP: 5 SOLUTION/ DROPS OPHTHALMIC at 03:21

## 2020-01-01 RX ADMIN — ENOXAPARIN SODIUM 40 MG: 100 INJECTION SUBCUTANEOUS at 17:25

## 2020-01-01 RX ADMIN — POTASSIUM BICARBONATE 25 MEQ: 978 TABLET, EFFERVESCENT ORAL at 16:49

## 2020-01-01 RX ADMIN — MORPHINE SULFATE 1 MG: 10 INJECTION INTRAVENOUS at 15:32

## 2020-01-01 RX ADMIN — DEXTROSE MONOHYDRATE: 50 INJECTION, SOLUTION INTRAVENOUS at 02:00

## 2020-01-01 RX ADMIN — SODIUM CHLORIDE: 9 INJECTION, SOLUTION INTRAVENOUS at 17:00

## 2020-01-01 RX ADMIN — POTASSIUM CHLORIDE 10 MEQ: 7.46 INJECTION, SOLUTION INTRAVENOUS at 00:56

## 2020-01-01 RX ADMIN — MAGNESIUM SULFATE 2 G: 2 INJECTION INTRAVENOUS at 15:27

## 2020-01-01 RX ADMIN — CARBOXYMETHYLCELLULOSE SODIUM 1 DROP: 5 SOLUTION/ DROPS OPHTHALMIC at 23:44

## 2020-01-01 RX ADMIN — POTASSIUM BICARBONATE 25 MEQ: 978 TABLET, EFFERVESCENT ORAL at 10:30

## 2020-01-01 RX ADMIN — SODIUM CHLORIDE, POTASSIUM CHLORIDE, SODIUM LACTATE AND CALCIUM CHLORIDE 1000 ML: 600; 310; 30; 20 INJECTION, SOLUTION INTRAVENOUS at 09:22

## 2020-01-01 RX ADMIN — DEXTROSE MONOHYDRATE: 50 INJECTION, SOLUTION INTRAVENOUS at 21:07

## 2020-01-01 RX ADMIN — CARBOXYMETHYLCELLULOSE SODIUM 1 DROP: 5 SOLUTION/ DROPS OPHTHALMIC at 09:30

## 2020-01-01 RX ADMIN — POTASSIUM CHLORIDE 20 MEQ: 14.9 INJECTION, SOLUTION INTRAVENOUS at 08:04

## 2020-01-01 RX ADMIN — PIPERACILLIN AND TAZOBACTAM 4.5 G: 4; .5 INJECTION, POWDER, LYOPHILIZED, FOR SOLUTION INTRAVENOUS; PARENTERAL at 13:57

## 2020-01-01 RX ADMIN — PIPERACILLIN AND TAZOBACTAM 4.5 G: 4; .5 INJECTION, POWDER, LYOPHILIZED, FOR SOLUTION INTRAVENOUS; PARENTERAL at 20:39

## 2020-01-01 RX ADMIN — PIPERACILLIN AND TAZOBACTAM 4.5 G: 4; .5 INJECTION, POWDER, LYOPHILIZED, FOR SOLUTION INTRAVENOUS; PARENTERAL at 04:55

## 2020-01-01 RX ADMIN — CARBOXYMETHYLCELLULOSE SODIUM 1 DROP: 5 SOLUTION/ DROPS OPHTHALMIC at 05:47

## 2020-01-01 RX ADMIN — DEXTROSE MONOHYDRATE: 50 INJECTION, SOLUTION INTRAVENOUS at 15:42

## 2020-01-01 RX ADMIN — POTASSIUM CHLORIDE 40 MEQ: 1500 TABLET, EXTENDED RELEASE ORAL at 18:00

## 2020-01-01 RX ADMIN — POTASSIUM BICARBONATE 25 MEQ: 978 TABLET, EFFERVESCENT ORAL at 05:02

## 2020-01-01 RX ADMIN — METOCLOPRAMIDE 10 MG: 5 INJECTION, SOLUTION INTRAMUSCULAR; INTRAVENOUS at 18:00

## 2020-01-01 RX ADMIN — POTASSIUM CHLORIDE AND SODIUM CHLORIDE 1000 ML: 900; 300 INJECTION, SOLUTION INTRAVENOUS at 08:28

## 2020-01-01 RX ADMIN — ENOXAPARIN SODIUM 40 MG: 100 INJECTION SUBCUTANEOUS at 05:37

## 2020-01-01 RX ADMIN — GADOTERIDOL 15 ML: 279.3 INJECTION, SOLUTION INTRAVENOUS at 17:05

## 2020-01-01 RX ADMIN — POTASSIUM CHLORIDE 10 MEQ: 7.46 INJECTION, SOLUTION INTRAVENOUS at 11:02

## 2020-01-01 RX ADMIN — LIDOCAINE HYDROCHLORIDE 30 MG: 20 INJECTION, SOLUTION INTRAVENOUS at 13:50

## 2020-01-01 RX ADMIN — POTASSIUM CHLORIDE 20 MEQ: 14.9 INJECTION, SOLUTION INTRAVENOUS at 15:27

## 2020-01-01 RX ADMIN — POTASSIUM PHOSPHATE, MONOBASIC AND POTASSIUM PHOSPHATE, DIBASIC 30 MMOL: 224; 236 INJECTION, SOLUTION, CONCENTRATE INTRAVENOUS at 11:11

## 2020-01-01 RX ADMIN — SCOPALAMINE 1 PATCH: 1 PATCH, EXTENDED RELEASE TRANSDERMAL at 17:12

## 2020-01-01 RX ADMIN — BACITRACIN ZINC: 500 OINTMENT TOPICAL at 20:23

## 2020-01-01 RX ADMIN — PROPOFOL 10 MCG/KG/MIN: 10 INJECTION, EMULSION INTRAVENOUS at 16:23

## 2020-01-01 RX ADMIN — SENNOSIDES AND DOCUSATE SODIUM 2 TABLET: 8.6; 5 TABLET ORAL at 18:16

## 2020-01-01 RX ADMIN — ONDANSETRON: 2 INJECTION INTRAMUSCULAR; INTRAVENOUS at 10:00

## 2020-01-01 RX ADMIN — POTASSIUM BICARBONATE 50 MEQ: 978 TABLET, EFFERVESCENT ORAL at 16:49

## 2020-01-01 RX ADMIN — PIPERACILLIN AND TAZOBACTAM 4.5 G: 4; .5 INJECTION, POWDER, LYOPHILIZED, FOR SOLUTION INTRAVENOUS; PARENTERAL at 13:00

## 2020-01-01 RX ADMIN — Medication 50 MG: at 11:56

## 2020-01-01 RX ADMIN — VANCOMYCIN HYDROCHLORIDE 125 MG: KIT ORAL at 13:05

## 2020-01-01 RX ADMIN — PIPERACILLIN AND TAZOBACTAM 4.5 G: 4; .5 INJECTION, POWDER, LYOPHILIZED, FOR SOLUTION INTRAVENOUS; PARENTERAL at 05:42

## 2020-01-01 RX ADMIN — POTASSIUM CHLORIDE 10 MEQ: 7.46 INJECTION, SOLUTION INTRAVENOUS at 08:12

## 2020-01-01 RX ADMIN — POTASSIUM CHLORIDE 20 MEQ: 14.9 INJECTION, SOLUTION INTRAVENOUS at 00:18

## 2020-01-01 RX ADMIN — FAMOTIDINE 20 MG: 10 INJECTION INTRAVENOUS at 17:46

## 2020-01-01 RX ADMIN — LOPERAMIDE HYDROCHLORIDE 4 MG: 2 CAPSULE ORAL at 17:38

## 2020-01-01 RX ADMIN — OMEPRAZOLE 40 MG: KIT at 06:33

## 2020-01-01 RX ADMIN — SODIUM CHLORIDE, POTASSIUM CHLORIDE, SODIUM LACTATE AND CALCIUM CHLORIDE 500 ML: 600; 310; 30; 20 INJECTION, SOLUTION INTRAVENOUS at 13:56

## 2020-01-01 RX ADMIN — MORPHINE SULFATE 4 MG: 10 INJECTION INTRAVENOUS at 15:21

## 2020-01-01 RX ADMIN — POTASSIUM CHLORIDE 10 MEQ: 7.46 INJECTION, SOLUTION INTRAVENOUS at 09:42

## 2020-01-01 RX ADMIN — DEXTROSE MONOHYDRATE: 50 INJECTION, SOLUTION INTRAVENOUS at 10:19

## 2020-01-01 RX ADMIN — POTASSIUM CHLORIDE 10 MEQ: 7.46 INJECTION, SOLUTION INTRAVENOUS at 18:48

## 2020-01-01 RX ADMIN — ALBUMIN (HUMAN) 250 ML: 2.5 SOLUTION INTRAVENOUS at 14:57

## 2020-01-01 RX ADMIN — MORPHINE SULFATE 5 MG: 10 INJECTION INTRAVENOUS at 17:01

## 2020-01-01 RX ADMIN — MORPHINE SULFATE 4 MG: 10 INJECTION INTRAVENOUS at 19:30

## 2020-01-01 RX ADMIN — PIPERACILLIN AND TAZOBACTAM 4.5 G: 4; .5 INJECTION, POWDER, LYOPHILIZED, FOR SOLUTION INTRAVENOUS; PARENTERAL at 20:48

## 2020-01-01 RX ADMIN — POTASSIUM CHLORIDE 10 MEQ: 7.46 INJECTION, SOLUTION INTRAVENOUS at 13:25

## 2020-01-01 RX ADMIN — MINERAL OIL AND WHITE PETROLATUM 1 APPLICATION: 30; 940 OINTMENT OPHTHALMIC at 15:18

## 2020-01-01 RX ADMIN — SODIUM CHLORIDE, SODIUM GLUCONATE, SODIUM ACETATE, POTASSIUM CHLORIDE AND MAGNESIUM CHLORIDE: 526; 502; 368; 37; 30 INJECTION, SOLUTION INTRAVENOUS at 14:04

## 2020-01-01 RX ADMIN — PIPERACILLIN AND TAZOBACTAM 4.5 G: 4; .5 INJECTION, POWDER, LYOPHILIZED, FOR SOLUTION INTRAVENOUS; PARENTERAL at 14:04

## 2020-01-01 RX ADMIN — CARBOXYMETHYLCELLULOSE SODIUM 1 DROP: 5 SOLUTION/ DROPS OPHTHALMIC at 20:23

## 2020-01-01 RX ADMIN — SODIUM CHLORIDE, POTASSIUM CHLORIDE, SODIUM LACTATE AND CALCIUM CHLORIDE 1000 ML: 600; 310; 30; 20 INJECTION, SOLUTION INTRAVENOUS at 09:23

## 2020-01-01 RX ADMIN — OMEPRAZOLE 40 MG: KIT at 06:24

## 2020-01-01 RX ADMIN — ENOXAPARIN SODIUM 40 MG: 100 INJECTION SUBCUTANEOUS at 16:50

## 2020-01-01 RX ADMIN — DEXTROSE MONOHYDRATE: 50 INJECTION, SOLUTION INTRAVENOUS at 06:33

## 2020-01-01 RX ADMIN — LOPERAMIDE HYDROCHLORIDE 4 MG: 2 CAPSULE ORAL at 12:05

## 2020-01-01 RX ADMIN — POTASSIUM CHLORIDE 40 MEQ: 1500 TABLET, EXTENDED RELEASE ORAL at 11:16

## 2020-01-01 RX ADMIN — MINERAL OIL AND WHITE PETROLATUM 1 APPLICATION: 30; 940 OINTMENT OPHTHALMIC at 06:06

## 2020-01-01 RX ADMIN — MORPHINE SULFATE 4 MG: 10 INJECTION INTRAVENOUS at 09:04

## 2020-01-01 RX ADMIN — SODIUM CHLORIDE, POTASSIUM CHLORIDE, SODIUM LACTATE AND CALCIUM CHLORIDE: 600; 310; 30; 20 INJECTION, SOLUTION INTRAVENOUS at 16:24

## 2020-01-01 RX ADMIN — ENOXAPARIN SODIUM 40 MG: 100 INJECTION SUBCUTANEOUS at 16:49

## 2020-01-01 RX ADMIN — MORPHINE SULFATE 4 MG: 4 INJECTION INTRAVENOUS at 10:55

## 2020-01-01 RX ADMIN — DEXTROSE AND SODIUM CHLORIDE: 5; 450 INJECTION, SOLUTION INTRAVENOUS at 20:55

## 2020-01-01 RX ADMIN — METOCLOPRAMIDE 10 MG: 5 INJECTION, SOLUTION INTRAMUSCULAR; INTRAVENOUS at 23:38

## 2020-01-01 RX ADMIN — MORPHINE SULFATE 4 MG: 10 INJECTION INTRAVENOUS at 06:45

## 2020-01-01 RX ADMIN — ENOXAPARIN SODIUM 40 MG: 100 INJECTION SUBCUTANEOUS at 17:38

## 2020-01-01 RX ADMIN — POTASSIUM BICARBONATE 25 MEQ: 978 TABLET, EFFERVESCENT ORAL at 06:06

## 2020-01-01 RX ADMIN — BACITRACIN ZINC: 500 OINTMENT TOPICAL at 06:44

## 2020-01-01 RX ADMIN — MORPHINE SULFATE 4 MG: 10 INJECTION INTRAVENOUS at 02:56

## 2020-01-01 RX ADMIN — METOCLOPRAMIDE 10 MG: 5 INJECTION, SOLUTION INTRAMUSCULAR; INTRAVENOUS at 05:22

## 2020-01-01 RX ADMIN — PIPERACILLIN AND TAZOBACTAM 4.5 G: 4; .5 INJECTION, POWDER, LYOPHILIZED, FOR SOLUTION INTRAVENOUS; PARENTERAL at 13:25

## 2020-01-01 RX ADMIN — LOPERAMIDE HYDROCHLORIDE 4 MG: 2 CAPSULE ORAL at 20:52

## 2020-01-01 RX ADMIN — MINERAL OIL AND WHITE PETROLATUM 1 APPLICATION: 30; 940 OINTMENT OPHTHALMIC at 10:59

## 2020-01-01 RX ADMIN — DEXTROSE MONOHYDRATE: 50 INJECTION, SOLUTION INTRAVENOUS at 22:15

## 2020-01-01 RX ADMIN — CARBOXYMETHYLCELLULOSE SODIUM 1 DROP: 5 SOLUTION/ DROPS OPHTHALMIC at 05:58

## 2020-01-01 RX ADMIN — ROCURONIUM BROMIDE 10 MG: 10 INJECTION, SOLUTION INTRAVENOUS at 13:50

## 2020-01-01 RX ADMIN — CARBOXYMETHYLCELLULOSE SODIUM 1 DROP: 5 SOLUTION/ DROPS OPHTHALMIC at 15:22

## 2020-01-01 RX ADMIN — SODIUM CHLORIDE 1000 ML: 9 INJECTION, SOLUTION INTRAVENOUS at 14:40

## 2020-01-01 RX ADMIN — CARBOXYMETHYLCELLULOSE SODIUM 1 DROP: 5 SOLUTION/ DROPS OPHTHALMIC at 20:20

## 2020-01-01 RX ADMIN — MORPHINE SULFATE 4 MG: 4 INJECTION INTRAVENOUS at 08:22

## 2020-01-01 RX ADMIN — DEXTROSE AND SODIUM CHLORIDE: 5; 450 INJECTION, SOLUTION INTRAVENOUS at 06:09

## 2020-01-01 RX ADMIN — FENTANYL CITRATE 50 MCG: 50 INJECTION, SOLUTION INTRAMUSCULAR; INTRAVENOUS at 15:15

## 2020-01-01 RX ADMIN — PANTOPRAZOLE SODIUM 40 MG: 40 INJECTION, POWDER, LYOPHILIZED, FOR SOLUTION INTRAVENOUS at 05:46

## 2020-01-01 RX ADMIN — POTASSIUM CHLORIDE 10 MEQ: 7.46 INJECTION, SOLUTION INTRAVENOUS at 14:56

## 2020-01-01 RX ADMIN — OMEPRAZOLE 40 MG: KIT at 05:02

## 2020-01-01 RX ADMIN — PANTOPRAZOLE SODIUM 40 MG: 40 INJECTION, POWDER, LYOPHILIZED, FOR SOLUTION INTRAVENOUS at 10:14

## 2020-01-01 RX ADMIN — MORPHINE SULFATE 4 MG: 4 INJECTION INTRAVENOUS at 20:34

## 2020-01-01 RX ADMIN — MORPHINE SULFATE 2 MG: 10 INJECTION INTRAVENOUS at 10:07

## 2020-01-01 RX ADMIN — PIPERACILLIN AND TAZOBACTAM 4.5 G: 4; .5 INJECTION, POWDER, LYOPHILIZED, FOR SOLUTION INTRAVENOUS; PARENTERAL at 05:59

## 2020-01-01 RX ADMIN — VANCOMYCIN HYDROCHLORIDE 125 MG: KIT ORAL at 08:04

## 2020-01-01 RX ADMIN — ENOXAPARIN SODIUM 40 MG: 100 INJECTION SUBCUTANEOUS at 05:42

## 2020-01-01 RX ADMIN — OMEPRAZOLE 40 MG: KIT at 05:25

## 2020-01-01 RX ADMIN — LOPERAMIDE HYDROCHLORIDE 4 MG: 2 CAPSULE ORAL at 20:55

## 2020-01-01 RX ADMIN — LOPERAMIDE HYDROCHLORIDE 4 MG: 2 CAPSULE ORAL at 21:43

## 2020-01-01 RX ADMIN — IOHEXOL 25 ML: 240 INJECTION, SOLUTION INTRATHECAL; INTRAVASCULAR; INTRAVENOUS; ORAL at 17:48

## 2020-01-01 RX ADMIN — PIPERACILLIN AND TAZOBACTAM 4.5 G: 4; .5 INJECTION, POWDER, LYOPHILIZED, FOR SOLUTION INTRAVENOUS; PARENTERAL at 22:10

## 2020-01-01 RX ADMIN — POTASSIUM CHLORIDE 20 MEQ: 14.9 INJECTION, SOLUTION INTRAVENOUS at 14:50

## 2020-01-01 RX ADMIN — MORPHINE SULFATE 3 MG: 10 INJECTION INTRAVENOUS at 17:31

## 2020-01-01 RX ADMIN — MINERAL OIL AND WHITE PETROLATUM 1 APPLICATION: 30; 940 OINTMENT OPHTHALMIC at 10:47

## 2020-01-01 RX ADMIN — BACITRACIN ZINC: 500 OINTMENT TOPICAL at 12:21

## 2020-01-01 RX ADMIN — POTASSIUM CHLORIDE 10 MEQ: 7.46 INJECTION, SOLUTION INTRAVENOUS at 01:26

## 2020-01-01 RX ADMIN — PIPERACILLIN AND TAZOBACTAM 4.5 G: 4; .5 INJECTION, POWDER, LYOPHILIZED, FOR SOLUTION INTRAVENOUS; PARENTERAL at 05:48

## 2020-01-01 RX ADMIN — POTASSIUM BICARBONATE 25 MEQ: 978 TABLET, EFFERVESCENT ORAL at 00:28

## 2020-01-01 RX ADMIN — MINERAL OIL AND WHITE PETROLATUM 1 APPLICATION: 30; 940 OINTMENT OPHTHALMIC at 10:32

## 2020-01-01 RX ADMIN — FAMOTIDINE 20 MG: 10 INJECTION INTRAVENOUS at 06:05

## 2020-01-01 RX ADMIN — OXYCODONE HYDROCHLORIDE 5 MG: 5 TABLET ORAL at 11:03

## 2020-01-01 RX ADMIN — POTASSIUM BICARBONATE 25 MEQ: 978 TABLET, EFFERVESCENT ORAL at 05:22

## 2020-01-01 RX ADMIN — CALCIUM GLUCONATE 2 G: 98 INJECTION, SOLUTION INTRAVENOUS at 09:49

## 2020-01-01 RX ADMIN — POTASSIUM CHLORIDE 20 MEQ: 14.9 INJECTION, SOLUTION INTRAVENOUS at 20:06

## 2020-01-01 RX ADMIN — LOPERAMIDE HYDROCHLORIDE 4 MG: 2 CAPSULE ORAL at 17:12

## 2020-01-01 RX ADMIN — MORPHINE SULFATE 2 MG: 10 INJECTION INTRAVENOUS at 01:16

## 2020-01-01 RX ADMIN — MORPHINE SULFATE 2 MG: 10 INJECTION INTRAVENOUS at 18:49

## 2020-01-01 RX ADMIN — POTASSIUM CHLORIDE 10 MEQ: 7.46 INJECTION, SOLUTION INTRAVENOUS at 19:43

## 2020-01-01 RX ADMIN — OXYCODONE HYDROCHLORIDE 5 MG: 5 TABLET ORAL at 21:43

## 2020-01-01 RX ADMIN — BACITRACIN ZINC: 500 OINTMENT TOPICAL at 05:58

## 2020-01-01 RX ADMIN — POTASSIUM CHLORIDE 10 MEQ: 7.46 INJECTION, SOLUTION INTRAVENOUS at 11:46

## 2020-01-01 RX ADMIN — MORPHINE SULFATE 4 MG: 10 INJECTION INTRAVENOUS at 12:36

## 2020-01-01 RX ADMIN — SODIUM CHLORIDE, POTASSIUM CHLORIDE, SODIUM LACTATE AND CALCIUM CHLORIDE: 600; 310; 30; 20 INJECTION, SOLUTION INTRAVENOUS at 14:04

## 2020-01-01 RX ADMIN — PIPERACILLIN AND TAZOBACTAM 4.5 G: 4; .5 INJECTION, POWDER, LYOPHILIZED, FOR SOLUTION INTRAVENOUS; PARENTERAL at 05:47

## 2020-01-01 RX ADMIN — OMEPRAZOLE 40 MG: KIT at 06:06

## 2020-01-01 RX ADMIN — SENNOSIDES AND DOCUSATE SODIUM 2 TABLET: 8.6; 5 TABLET ORAL at 05:41

## 2020-01-01 RX ADMIN — VASOPRESSIN 0.03 UNITS/MIN: 20 INJECTION INTRAVENOUS at 18:12

## 2020-01-01 RX ADMIN — MAGNESIUM SULFATE 2 G: 2 INJECTION INTRAVENOUS at 17:38

## 2020-01-01 RX ADMIN — METOCLOPRAMIDE 10 MG: 5 INJECTION, SOLUTION INTRAMUSCULAR; INTRAVENOUS at 11:16

## 2020-01-01 RX ADMIN — POTASSIUM CHLORIDE 10 MEQ: 7.46 INJECTION, SOLUTION INTRAVENOUS at 11:49

## 2020-01-01 RX ADMIN — MINERAL OIL AND WHITE PETROLATUM 1 APPLICATION: 30; 940 OINTMENT OPHTHALMIC at 21:43

## 2020-01-01 RX ADMIN — LOPERAMIDE HYDROCHLORIDE 4 MG: 2 CAPSULE ORAL at 08:29

## 2020-01-01 RX ADMIN — ENOXAPARIN SODIUM 40 MG: 100 INJECTION SUBCUTANEOUS at 04:59

## 2020-01-01 RX ADMIN — MORPHINE SULFATE 4 MG: 4 INJECTION INTRAVENOUS at 07:19

## 2020-01-01 RX ADMIN — LOPERAMIDE HYDROCHLORIDE 2 MG: 2 CAPSULE ORAL at 16:49

## 2020-01-01 RX ADMIN — DIBASIC SODIUM PHOSPHATE, MONOBASIC POTASSIUM PHOSPHATE AND MONOBASIC SODIUM PHOSPHATE 1 TABLET: 852; 155; 130 TABLET ORAL at 06:33

## 2020-01-01 RX ADMIN — METOCLOPRAMIDE 10 MG: 5 INJECTION, SOLUTION INTRAMUSCULAR; INTRAVENOUS at 12:43

## 2020-01-01 RX ADMIN — LOPERAMIDE HYDROCHLORIDE 2 MG: 2 CAPSULE ORAL at 21:29

## 2020-01-01 RX ADMIN — POTASSIUM CHLORIDE 10 MEQ: 7.46 INJECTION, SOLUTION INTRAVENOUS at 00:54

## 2020-01-01 RX ADMIN — SODIUM CHLORIDE 1000 ML: 9 INJECTION, SOLUTION INTRAVENOUS at 11:32

## 2020-01-01 RX ADMIN — POTASSIUM CHLORIDE 20 MEQ: 14.9 INJECTION, SOLUTION INTRAVENOUS at 22:38

## 2020-01-01 RX ADMIN — LOPERAMIDE HYDROCHLORIDE 4 MG: 2 CAPSULE ORAL at 09:05

## 2020-01-01 RX ADMIN — ENOXAPARIN SODIUM 40 MG: 100 INJECTION SUBCUTANEOUS at 05:49

## 2020-01-01 RX ADMIN — MORPHINE SULFATE 10 MG: 10 INJECTION INTRAVENOUS at 14:39

## 2020-01-01 RX ADMIN — POTASSIUM CHLORIDE: 14.9 INJECTION, SOLUTION INTRAVENOUS at 15:08

## 2020-01-01 RX ADMIN — OXYCODONE HYDROCHLORIDE 5 MG: 5 TABLET ORAL at 05:12

## 2020-01-01 RX ADMIN — MORPHINE SULFATE 2 MG: 10 INJECTION INTRAVENOUS at 04:19

## 2020-01-01 RX ADMIN — POTASSIUM CHLORIDE 40 MEQ: 1500 TABLET, EXTENDED RELEASE ORAL at 08:26

## 2020-01-01 RX ADMIN — METOCLOPRAMIDE 10 MG: 5 INJECTION, SOLUTION INTRAMUSCULAR; INTRAVENOUS at 05:25

## 2020-01-01 RX ADMIN — LOPERAMIDE HYDROCHLORIDE 2 MG: 2 CAPSULE ORAL at 16:50

## 2020-01-01 RX ADMIN — ENOXAPARIN SODIUM 40 MG: 100 INJECTION SUBCUTANEOUS at 05:46

## 2020-01-01 RX ADMIN — POTASSIUM BICARBONATE 25 MEQ: 978 TABLET, EFFERVESCENT ORAL at 06:24

## 2020-01-01 RX ADMIN — METOCLOPRAMIDE 10 MG: 5 INJECTION, SOLUTION INTRAMUSCULAR; INTRAVENOUS at 13:18

## 2020-01-01 RX ADMIN — POTASSIUM CHLORIDE 10 MEQ: 7.46 INJECTION, SOLUTION INTRAVENOUS at 11:55

## 2020-01-01 RX ADMIN — SODIUM CHLORIDE, POTASSIUM CHLORIDE, SODIUM LACTATE AND CALCIUM CHLORIDE 1000 ML: 600; 310; 30; 20 INJECTION, SOLUTION INTRAVENOUS at 11:44

## 2020-01-01 RX ADMIN — PIPERACILLIN AND TAZOBACTAM 4.5 G: 4; .5 INJECTION, POWDER, LYOPHILIZED, FOR SOLUTION INTRAVENOUS; PARENTERAL at 12:43

## 2020-01-01 RX ADMIN — POTASSIUM PHOSPHATE, MONOBASIC AND POTASSIUM PHOSPHATE, DIBASIC 30 MMOL: 224; 236 INJECTION, SOLUTION, CONCENTRATE INTRAVENOUS at 18:16

## 2020-01-01 RX ADMIN — PIPERACILLIN AND TAZOBACTAM 4.5 G: 4; .5 INJECTION, POWDER, LYOPHILIZED, FOR SOLUTION INTRAVENOUS; PARENTERAL at 22:13

## 2020-01-01 RX ADMIN — POTASSIUM CHLORIDE 20 MEQ: 14.9 INJECTION, SOLUTION INTRAVENOUS at 19:51

## 2020-01-01 RX ADMIN — SODIUM CHLORIDE, POTASSIUM CHLORIDE, SODIUM LACTATE AND CALCIUM CHLORIDE 500 ML: 600; 310; 30; 20 INJECTION, SOLUTION INTRAVENOUS at 09:27

## 2020-01-01 RX ADMIN — POTASSIUM BICARBONATE 25 MEQ: 978 TABLET, EFFERVESCENT ORAL at 12:47

## 2020-01-01 RX ADMIN — METOCLOPRAMIDE 10 MG: 5 INJECTION, SOLUTION INTRAMUSCULAR; INTRAVENOUS at 18:09

## 2020-01-01 RX ADMIN — LOPERAMIDE HYDROCHLORIDE 2 MG: 2 CAPSULE ORAL at 12:48

## 2020-01-01 RX ADMIN — DEXTROSE MONOHYDRATE: 50 INJECTION, SOLUTION INTRAVENOUS at 09:20

## 2020-01-01 RX ADMIN — POTASSIUM CHLORIDE 20 MEQ: 14.9 INJECTION, SOLUTION INTRAVENOUS at 13:59

## 2020-01-01 RX ADMIN — BACITRACIN ZINC: 500 OINTMENT TOPICAL at 08:50

## 2020-01-01 RX ADMIN — POTASSIUM CHLORIDE 10 MEQ: 7.46 INJECTION, SOLUTION INTRAVENOUS at 10:43

## 2020-01-01 RX ADMIN — METOCLOPRAMIDE 10 MG: 5 INJECTION, SOLUTION INTRAMUSCULAR; INTRAVENOUS at 05:42

## 2020-01-01 RX ADMIN — POTASSIUM CHLORIDE 20 MEQ: 14.9 INJECTION, SOLUTION INTRAVENOUS at 20:13

## 2020-01-01 RX ADMIN — POTASSIUM BICARBONATE 25 MEQ: 978 TABLET, EFFERVESCENT ORAL at 18:09

## 2020-01-01 RX ADMIN — METOCLOPRAMIDE 10 MG: 5 INJECTION, SOLUTION INTRAMUSCULAR; INTRAVENOUS at 18:15

## 2020-01-01 RX ADMIN — Medication 200 MCG: at 14:22

## 2020-01-01 RX ADMIN — BACITRACIN ZINC: 500 OINTMENT TOPICAL at 12:02

## 2020-01-01 RX ADMIN — MINERAL OIL AND WHITE PETROLATUM 1 APPLICATION: 30; 940 OINTMENT OPHTHALMIC at 10:45

## 2020-01-01 RX ADMIN — POTASSIUM CHLORIDE 40 MEQ: 1500 TABLET, EXTENDED RELEASE ORAL at 05:25

## 2020-01-01 RX ADMIN — POTASSIUM CHLORIDE 10 MEQ: 7.46 INJECTION, SOLUTION INTRAVENOUS at 14:00

## 2020-01-01 RX ADMIN — MAGNESIUM SULFATE 2 G: 2 INJECTION INTRAVENOUS at 11:53

## 2020-01-01 RX ADMIN — POTASSIUM CHLORIDE 10 MEQ: 7.46 INJECTION, SOLUTION INTRAVENOUS at 01:16

## 2020-01-01 RX ADMIN — EPHEDRINE SULFATE 20 MG: 50 INJECTION, SOLUTION INTRAVENOUS at 14:05

## 2020-01-01 RX ADMIN — DIBASIC SODIUM PHOSPHATE, MONOBASIC POTASSIUM PHOSPHATE AND MONOBASIC SODIUM PHOSPHATE 1 TABLET: 852; 155; 130 TABLET ORAL at 16:50

## 2020-01-01 RX ADMIN — MORPHINE SULFATE 5 MG: 10 INJECTION INTRAVENOUS at 15:16

## 2020-01-01 RX ADMIN — NOREPINEPHRINE BITARTRATE 8 MCG/MIN: 1 INJECTION INTRAVENOUS at 09:37

## 2020-01-01 RX ADMIN — LOPERAMIDE HYDROCHLORIDE 2 MG: 2 CAPSULE ORAL at 09:59

## 2020-01-01 RX ADMIN — CARBOXYMETHYLCELLULOSE SODIUM 1 DROP: 5 SOLUTION/ DROPS OPHTHALMIC at 23:40

## 2020-01-01 RX ADMIN — MORPHINE SULFATE 1 MG: 10 INJECTION INTRAVENOUS at 17:46

## 2020-01-01 RX ADMIN — SODIUM CHLORIDE, POTASSIUM CHLORIDE, SODIUM LACTATE AND CALCIUM CHLORIDE: 600; 310; 30; 20 INJECTION, SOLUTION INTRAVENOUS at 17:57

## 2020-01-01 RX ADMIN — SODIUM CHLORIDE 500 ML: 9 INJECTION, SOLUTION INTRAVENOUS at 06:48

## 2020-01-01 RX ADMIN — OCTREOTIDE ACETATE 100 MCG: 100 INJECTION, SOLUTION INTRAVENOUS; SUBCUTANEOUS at 14:39

## 2020-01-01 RX ADMIN — POTASSIUM BICARBONATE 25 MEQ: 978 TABLET, EFFERVESCENT ORAL at 17:45

## 2020-01-01 RX ADMIN — MORPHINE SULFATE 5 MG: 10 INJECTION INTRAVENOUS at 08:59

## 2020-01-01 RX ADMIN — VANCOMYCIN HYDROCHLORIDE 125 MG: KIT ORAL at 12:05

## 2020-01-01 RX ADMIN — POTASSIUM CHLORIDE 10 MEQ: 7.46 INJECTION, SOLUTION INTRAVENOUS at 09:34

## 2020-01-01 RX ADMIN — PIPERACILLIN AND TAZOBACTAM 4.5 G: 4; .5 INJECTION, POWDER, LYOPHILIZED, FOR SOLUTION INTRAVENOUS; PARENTERAL at 05:46

## 2020-01-01 RX ADMIN — IOHEXOL 100 ML: 350 INJECTION, SOLUTION INTRAVENOUS at 17:48

## 2020-01-01 RX ADMIN — POTASSIUM CHLORIDE 20 MEQ: 14.9 INJECTION, SOLUTION INTRAVENOUS at 13:29

## 2020-01-01 RX ADMIN — PIPERACILLIN AND TAZOBACTAM 4.5 G: 4; .5 INJECTION, POWDER, LYOPHILIZED, FOR SOLUTION INTRAVENOUS; PARENTERAL at 09:29

## 2020-01-01 RX ADMIN — DEXTROSE MONOHYDRATE: 50 INJECTION, SOLUTION INTRAVENOUS at 11:55

## 2020-01-01 RX ADMIN — OXYCODONE HYDROCHLORIDE 5 MG: 5 TABLET ORAL at 17:12

## 2020-01-01 RX ADMIN — DEXTROSE MONOHYDRATE: 50 INJECTION, SOLUTION INTRAVENOUS at 09:31

## 2020-01-01 RX ADMIN — GLUCAGON HYDROCHLORIDE 1 MG: KIT at 16:25

## 2020-01-01 RX ADMIN — MAGNESIUM SULFATE 2 G: 2 INJECTION INTRAVENOUS at 11:23

## 2020-01-01 RX ADMIN — ENOXAPARIN SODIUM 40 MG: 100 INJECTION SUBCUTANEOUS at 17:45

## 2020-01-01 RX ADMIN — MORPHINE SULFATE 10 MG: 10 INJECTION INTRAVENOUS at 18:30

## 2020-01-01 RX ADMIN — POTASSIUM CHLORIDE 10 MEQ: 7.46 INJECTION, SOLUTION INTRAVENOUS at 06:48

## 2020-01-01 RX ADMIN — METOCLOPRAMIDE 10 MG: 5 INJECTION, SOLUTION INTRAMUSCULAR; INTRAVENOUS at 00:24

## 2020-01-01 RX ADMIN — SODIUM CHLORIDE, POTASSIUM CHLORIDE, SODIUM LACTATE AND CALCIUM CHLORIDE: 600; 310; 30; 20 INJECTION, SOLUTION INTRAVENOUS at 13:45

## 2020-01-01 RX ADMIN — POTASSIUM BICARBONATE 25 MEQ: 978 TABLET, EFFERVESCENT ORAL at 17:37

## 2020-01-01 RX ADMIN — VANCOMYCIN HYDROCHLORIDE 125 MG: KIT ORAL at 23:13

## 2020-01-01 RX ADMIN — MORPHINE SULFATE 4 MG: 4 INJECTION INTRAVENOUS at 06:42

## 2020-01-01 RX ADMIN — MORPHINE SULFATE 4 MG: 10 INJECTION INTRAVENOUS at 17:56

## 2020-01-01 RX ADMIN — BACITRACIN ZINC: 500 OINTMENT TOPICAL at 23:44

## 2020-01-01 RX ADMIN — SODIUM CHLORIDE, POTASSIUM CHLORIDE, SODIUM LACTATE AND CALCIUM CHLORIDE: 600; 310; 30; 20 INJECTION, SOLUTION INTRAVENOUS at 12:00

## 2020-01-01 RX ADMIN — LORAZEPAM 1 MG: 2 INJECTION INTRAMUSCULAR; INTRAVENOUS at 10:39

## 2020-01-01 RX ADMIN — SODIUM CHLORIDE 500 ML: 9 INJECTION, SOLUTION INTRAVENOUS at 11:54

## 2020-01-01 RX ADMIN — POTASSIUM CHLORIDE 20 MEQ: 14.9 INJECTION, SOLUTION INTRAVENOUS at 05:46

## 2020-01-01 RX ADMIN — MORPHINE SULFATE 5 MG: 10 INJECTION INTRAVENOUS at 06:24

## 2020-01-01 RX ADMIN — NOREPINEPHRINE BITARTRATE 10 MCG/MIN: 1 INJECTION INTRAVENOUS at 16:25

## 2020-01-01 RX ADMIN — POTASSIUM CHLORIDE 40 MEQ: 29.8 INJECTION, SOLUTION INTRAVENOUS at 09:31

## 2020-01-01 RX ADMIN — SUCCINYLCHOLINE CHLORIDE 120 MG: 20 INJECTION, SOLUTION INTRAMUSCULAR; INTRAVENOUS at 13:50

## 2020-01-01 RX ADMIN — MINERAL OIL AND WHITE PETROLATUM 1 APPLICATION: 30; 940 OINTMENT OPHTHALMIC at 17:02

## 2020-01-01 RX ADMIN — POTASSIUM BICARBONATE 25 MEQ: 978 TABLET, EFFERVESCENT ORAL at 06:47

## 2020-01-01 RX ADMIN — MORPHINE SULFATE 2 MG: 10 INJECTION INTRAVENOUS at 06:41

## 2020-01-01 RX ADMIN — MORPHINE SULFATE 10 MG: 10 INJECTION INTRAVENOUS at 11:02

## 2020-01-01 RX ADMIN — POTASSIUM CHLORIDE 10 MEQ: 7.46 INJECTION, SOLUTION INTRAVENOUS at 02:00

## 2020-01-01 RX ADMIN — MORPHINE SULFATE 5 MG: 10 INJECTION INTRAVENOUS at 09:49

## 2020-01-01 RX ADMIN — PIPERACILLIN AND TAZOBACTAM 4.5 G: 4; .5 INJECTION, POWDER, LYOPHILIZED, FOR SOLUTION INTRAVENOUS; PARENTERAL at 04:15

## 2020-01-01 RX ADMIN — ENOXAPARIN SODIUM 40 MG: 100 INJECTION SUBCUTANEOUS at 06:04

## 2020-01-01 RX ADMIN — SCOPALAMINE 1 PATCH: 1 PATCH, EXTENDED RELEASE TRANSDERMAL at 17:05

## 2020-01-01 RX ADMIN — POTASSIUM CHLORIDE 20 MEQ: 14.9 INJECTION, SOLUTION INTRAVENOUS at 06:41

## 2020-01-01 RX ADMIN — MAGNESIUM SULFATE IN WATER 2 G: 40 INJECTION, SOLUTION INTRAVENOUS at 16:48

## 2020-01-01 RX ADMIN — LOPERAMIDE HYDROCHLORIDE 4 MG: 2 CAPSULE ORAL at 08:04

## 2020-01-01 RX ADMIN — SODIUM CHLORIDE, POTASSIUM CHLORIDE, SODIUM LACTATE AND CALCIUM CHLORIDE: 600; 310; 30; 20 INJECTION, SOLUTION INTRAVENOUS at 14:30

## 2020-01-01 RX ADMIN — OMEPRAZOLE 40 MG: KIT at 06:47

## 2020-01-01 RX ADMIN — PANTOPRAZOLE SODIUM 40 MG: 40 INJECTION, POWDER, LYOPHILIZED, FOR SOLUTION INTRAVENOUS at 05:42

## 2020-01-01 RX ADMIN — GADOTERIDOL 15 ML: 279.3 INJECTION, SOLUTION INTRAVENOUS at 14:40

## 2020-01-01 RX ADMIN — DIBASIC SODIUM PHOSPHATE, MONOBASIC POTASSIUM PHOSPHATE AND MONOBASIC SODIUM PHOSPHATE 1 TABLET: 852; 155; 130 TABLET ORAL at 11:51

## 2020-01-01 RX ADMIN — CARBOXYMETHYLCELLULOSE SODIUM 1 DROP: 5 SOLUTION/ DROPS OPHTHALMIC at 12:21

## 2020-01-01 RX ADMIN — PIPERACILLIN AND TAZOBACTAM 4.5 G: 4; .5 INJECTION, POWDER, LYOPHILIZED, FOR SOLUTION INTRAVENOUS; PARENTERAL at 21:12

## 2020-01-01 RX ADMIN — POTASSIUM CHLORIDE 20 MEQ: 14.9 INJECTION, SOLUTION INTRAVENOUS at 21:49

## 2020-01-01 RX ADMIN — POTASSIUM BICARBONATE 25 MEQ: 978 TABLET, EFFERVESCENT ORAL at 11:51

## 2020-01-01 RX ADMIN — CARBOXYMETHYLCELLULOSE SODIUM 1 DROP: 5 SOLUTION/ DROPS OPHTHALMIC at 08:50

## 2020-01-01 RX ADMIN — MORPHINE SULFATE 5 MG: 100 SOLUTION ORAL at 09:27

## 2020-01-01 RX ADMIN — ENOXAPARIN SODIUM 40 MG: 100 INJECTION SUBCUTANEOUS at 05:22

## 2020-01-01 RX ADMIN — VASOPRESSIN 0.03 UNITS/MIN: 20 INJECTION INTRAVENOUS at 20:58

## 2020-01-01 RX ADMIN — ROCURONIUM BROMIDE 10 MG: 10 INJECTION, SOLUTION INTRAVENOUS at 15:16

## 2020-01-01 RX ADMIN — POTASSIUM BICARBONATE 25 MEQ: 978 TABLET, EFFERVESCENT ORAL at 08:29

## 2020-01-01 RX ADMIN — PROPOFOL 60 MG: 10 INJECTION, EMULSION INTRAVENOUS at 13:50

## 2020-01-01 RX ADMIN — MORPHINE SULFATE 5 MG: 10 INJECTION INTRAVENOUS at 11:21

## 2020-01-01 RX ADMIN — FENTANYL CITRATE 100 MCG: 50 INJECTION, SOLUTION INTRAMUSCULAR; INTRAVENOUS at 16:55

## 2020-01-01 SDOH — ECONOMIC STABILITY: GENERAL

## 2020-01-01 ASSESSMENT — LIFESTYLE VARIABLES
HAVE YOU EVER FELT YOU SHOULD CUT DOWN ON YOUR DRINKING: NO
SUBSTANCE_ABUSE: 0
DOES PATIENT WANT TO STOP DRINKING: CANNOT ASSESS
SUBSTANCE_ABUSE: 0
EVER FELT BAD OR GUILTY ABOUT YOUR DRINKING: NO
HAVE YOU EVER FELT YOU SHOULD CUT DOWN ON YOUR DRINKING: NO
SUBSTANCE_ABUSE: 0
SUBSTANCE_ABUSE: 0
TOTAL SCORE: 0
SUBSTANCE_ABUSE: 0
CONSUMPTION TOTAL: NEGATIVE
ON A TYPICAL DAY WHEN YOU DRINK ALCOHOL HOW MANY DRINKS DO YOU HAVE: 0
HOW MANY TIMES IN THE PAST YEAR HAVE YOU HAD 5 OR MORE DRINKS IN A DAY: 0
ON A TYPICAL DAY WHEN YOU DRINK ALCOHOL HOW MANY DRINKS DO YOU HAVE: 0
TOTAL SCORE: 0
HAVE PEOPLE ANNOYED YOU BY CRITICIZING YOUR DRINKING: NO
HAVE PEOPLE ANNOYED YOU BY CRITICIZING YOUR DRINKING: NO
SUBSTANCE_ABUSE: 0
TOTAL SCORE: 0
ALCOHOL_USE: NO
TOTAL SCORE: 0
EVER_SMOKED: YES
SUBSTANCE_ABUSE: 0
TOTAL SCORE: 0
HOW MANY TIMES IN THE PAST YEAR HAVE YOU HAD 5 OR MORE DRINKS IN A DAY: 0
EVER HAD A DRINK FIRST THING IN THE MORNING TO STEADY YOUR NERVES TO GET RID OF A HANGOVER: NO
AVERAGE NUMBER OF DAYS PER WEEK YOU HAVE A DRINK CONTAINING ALCOHOL: 0
EVER FELT BAD OR GUILTY ABOUT YOUR DRINKING: NO
EVER HAD A DRINK FIRST THING IN THE MORNING TO STEADY YOUR NERVES TO GET RID OF A HANGOVER: NO
SUBSTANCE_ABUSE: 0
CONSUMPTION TOTAL: NEGATIVE
SUBSTANCE_ABUSE: 0
AVERAGE NUMBER OF DAYS PER WEEK YOU HAVE A DRINK CONTAINING ALCOHOL: 0
SUBSTANCE_ABUSE: 0
EVER_SMOKED: UNABLE TO EVALUATE AT THIS TIME - NEEDS ASSESSMENT PRIOR TO DISCHARGE
ALCOHOL_USE: NO
TOTAL SCORE: 0
SUBSTANCE_ABUSE: 0

## 2020-01-01 ASSESSMENT — COGNITIVE AND FUNCTIONAL STATUS - GENERAL
EATING MEALS: A LITTLE
MOBILITY SCORE: 8
DRESSING REGULAR UPPER BODY CLOTHING: TOTAL
SUGGESTED CMS G CODE MODIFIER DAILY ACTIVITY: CN
TOILETING: TOTAL
TURNING FROM BACK TO SIDE WHILE IN FLAT BAD: UNABLE
EATING MEALS: TOTAL
STANDING UP FROM CHAIR USING ARMS: TOTAL
DRESSING REGULAR UPPER BODY CLOTHING: A LOT
SUGGESTED CMS G CODE MODIFIER DAILY ACTIVITY: CN
WALKING IN HOSPITAL ROOM: TOTAL
WALKING IN HOSPITAL ROOM: A LITTLE
TURNING FROM BACK TO SIDE WHILE IN FLAT BAD: UNABLE
MOBILITY SCORE: 6
PERSONAL GROOMING: A LOT
MOBILITY SCORE: 6
CLIMB 3 TO 5 STEPS WITH RAILING: TOTAL
CLIMB 3 TO 5 STEPS WITH RAILING: TOTAL
TURNING FROM BACK TO SIDE WHILE IN FLAT BAD: UNABLE
SUGGESTED CMS G CODE MODIFIER DAILY ACTIVITY: CM
TOILETING: TOTAL
DAILY ACTIVITIY SCORE: 11
DRESSING REGULAR UPPER BODY CLOTHING: A LOT
HELP NEEDED FOR BATHING: A LOT
MOVING FROM LYING ON BACK TO SITTING ON SIDE OF FLAT BED: UNABLE
SUGGESTED CMS G CODE MODIFIER DAILY ACTIVITY: CL
EATING MEALS: TOTAL
MOVING FROM LYING ON BACK TO SITTING ON SIDE OF FLAT BED: UNABLE
SUGGESTED CMS G CODE MODIFIER MOBILITY: CM
DRESSING REGULAR UPPER BODY CLOTHING: TOTAL
MOBILITY SCORE: 11
SUGGESTED CMS G CODE MODIFIER MOBILITY: CL
SUGGESTED CMS G CODE MODIFIER DAILY ACTIVITY: CK
TOILETING: TOTAL
MOVING TO AND FROM BED TO CHAIR: UNABLE
DAILY ACTIVITIY SCORE: 10
MOBILITY SCORE: 6
DAILY ACTIVITIY SCORE: 15
WALKING IN HOSPITAL ROOM: A LOT
TURNING FROM BACK TO SIDE WHILE IN FLAT BAD: UNABLE
TOILETING: TOTAL
CLIMB 3 TO 5 STEPS WITH RAILING: TOTAL
STANDING UP FROM CHAIR USING ARMS: A LOT
MOVING TO AND FROM BED TO CHAIR: UNABLE
STANDING UP FROM CHAIR USING ARMS: TOTAL
STANDING UP FROM CHAIR USING ARMS: TOTAL
MOVING FROM LYING ON BACK TO SITTING ON SIDE OF FLAT BED: UNABLE
PERSONAL GROOMING: TOTAL
WALKING IN HOSPITAL ROOM: TOTAL
HELP NEEDED FOR BATHING: A LOT
SUGGESTED CMS G CODE MODIFIER DAILY ACTIVITY: CL
DRESSING REGULAR LOWER BODY CLOTHING: A LOT
STANDING UP FROM CHAIR USING ARMS: A LITTLE
HELP NEEDED FOR BATHING: TOTAL
DRESSING REGULAR LOWER BODY CLOTHING: TOTAL
EATING MEALS: TOTAL
DRESSING REGULAR LOWER BODY CLOTHING: A LOT
TURNING FROM BACK TO SIDE WHILE IN FLAT BAD: UNABLE
MOVING FROM LYING ON BACK TO SITTING ON SIDE OF FLAT BED: UNABLE
WALKING IN HOSPITAL ROOM: TOTAL
PERSONAL GROOMING: TOTAL
EATING MEALS: TOTAL
SUGGESTED CMS G CODE MODIFIER MOBILITY: CM
MOVING FROM LYING ON BACK TO SITTING ON SIDE OF FLAT BED: UNABLE
DRESSING REGULAR UPPER BODY CLOTHING: A LITTLE
PERSONAL GROOMING: A LOT
MOVING TO AND FROM BED TO CHAIR: UNABLE
CLIMB 3 TO 5 STEPS WITH RAILING: TOTAL
DAILY ACTIVITIY SCORE: 6
TOILETING: A LOT
DRESSING REGULAR UPPER BODY CLOTHING: A LOT
SUGGESTED CMS G CODE MODIFIER MOBILITY: CN
DAILY ACTIVITIY SCORE: 6
MOBILITY SCORE: 7
SUGGESTED CMS G CODE MODIFIER MOBILITY: CN
MOVING TO AND FROM BED TO CHAIR: UNABLE
DRESSING REGULAR LOWER BODY CLOTHING: A LOT
TURNING FROM BACK TO SIDE WHILE IN FLAT BAD: A LOT
TOILETING: A LOT
DRESSING REGULAR LOWER BODY CLOTHING: TOTAL
CLIMB 3 TO 5 STEPS WITH RAILING: A LOT
PERSONAL GROOMING: A LOT
HELP NEEDED FOR BATHING: TOTAL
MOVING TO AND FROM BED TO CHAIR: UNABLE
CLIMB 3 TO 5 STEPS WITH RAILING: TOTAL
HELP NEEDED FOR BATHING: A LOT
HELP NEEDED FOR BATHING: TOTAL
STANDING UP FROM CHAIR USING ARMS: TOTAL
DRESSING REGULAR LOWER BODY CLOTHING: TOTAL
EATING MEALS: TOTAL
MOVING FROM LYING ON BACK TO SITTING ON SIDE OF FLAT BED: UNABLE
PERSONAL GROOMING: A LITTLE
MOVING TO AND FROM BED TO CHAIR: UNABLE
DAILY ACTIVITIY SCORE: 8
WALKING IN HOSPITAL ROOM: TOTAL
SUGGESTED CMS G CODE MODIFIER MOBILITY: CN

## 2020-01-01 ASSESSMENT — ENCOUNTER SYMPTOMS
ABDOMINAL PAIN: 0
ABDOMINAL PAIN: 1
BLURRED VISION: 0
EYE DISCHARGE: 0
ABDOMINAL PAIN: 0
BACK PAIN: 0
SHORTNESS OF BREATH: 0
DIARRHEA: 1
NAUSEA: 1
NAUSEA: 0
DIARRHEA: 1
WEAKNESS: 1
ORTHOPNEA: 0
WEIGHT LOSS: 1
FEVER: 0
PHOTOPHOBIA: 0
FEVER: 0
BLOOD IN STOOL: 0
ABDOMINAL PAIN: 1
DOUBLE VISION: 0
CHANGE IN LEVEL OF CONSCIOUSNESS: 1
BLURRED VISION: 0
COUGH: 0
FALLS: 0
NAUSEA: 0
ORTHOPNEA: 0
DIZZINESS: 0
ABDOMINAL PAIN: 1
NECK PAIN: 0
VOMITING: 0
LAST BOWEL MOVEMENT: 65477
WEAKNESS: 1
TINGLING: 0
BRUISES/BLEEDS EASILY: 0
CONSTIPATION: 0
MYALGIAS: 1
DIZZINESS: 0
EYE PAIN: 0
DIARRHEA: 1
BLURRED VISION: 0
ABDOMINAL PAIN: 0
ORTHOPNEA: 0
CONSTIPATION: 0
MYALGIAS: 0
DIZZINESS: 0
HEMOPTYSIS: 0
DIZZINESS: 0
DEPRESSION: 0
WEAKNESS: 1
FALLS: 0
VOMITING: 0
HEADACHES: 0
DIARRHEA: 0
COUGH: 0
BRUISES/BLEEDS EASILY: 0
BLURRED VISION: 0
NAUSEA: 0
ABDOMINAL PAIN: 0
FEVER: 0
BLURRED VISION: 0
BLURRED VISION: 0
CHILLS: 0
VOMITING: 0
MYALGIAS: 1
VOMITING: 0
MYALGIAS: 1
HEADACHES: 0
PHOTOPHOBIA: 0
COUGH: 0
VOMITING: 0
NECK PAIN: 0
SHORTNESS OF BREATH: 0
SHORTNESS OF BREATH: 0
WEAKNESS: 1
SHORTNESS OF BREATH: 0
CHILLS: 0
VOMITING: 0
SPUTUM PRODUCTION: 0
SHORTNESS OF BREATH: 1
BLURRED VISION: 0
CHILLS: 0
BRUISES/BLEEDS EASILY: 0
HALLUCINATIONS: 0
PHOTOPHOBIA: 0
HEADACHES: 0
CHILLS: 0
ABDOMINAL PAIN: 0
SHORTNESS OF BREATH: 0
DOUBLE VISION: 0
DIARRHEA: 1
FEVER: 0
VOMITING: 0
DEPRESSION: 0
FEVER: 0
FALLS: 0
DIARRHEA: 0
DOUBLE VISION: 0
BOWEL INCONTINENCE: 1
FOCAL WEAKNESS: 0
WEAKNESS: 1
MYALGIAS: 1
DIARRHEA: 1
HEMOPTYSIS: 0
NERVOUS/ANXIOUS: 0
PALPITATIONS: 0
MYALGIAS: 1
BLURRED VISION: 0
FEVER: 0
DIARRHEA: 1
CONSTIPATION: 0
FEVER: 0
HEMOPTYSIS: 0
BLURRED VISION: 0
HEADACHES: 0
MYALGIAS: 1
DIARRHEA: 1
DOUBLE VISION: 0
CHILLS: 0
BRUISES/BLEEDS EASILY: 0
WEAKNESS: 1
DIZZINESS: 0
SHORTNESS OF BREATH: 1
NECK PAIN: 0
BRUISES/BLEEDS EASILY: 0
WEAKNESS: 1
WEAKNESS: 1
STOOL FREQUENCY: MORE THAN TWICE DAILY
PHOTOPHOBIA: 0
WEAKNESS: 1
BLURRED VISION: 0
BLOOD IN STOOL: 0
SHORTNESS OF BREATH: 0
VOMITING: 0
DOUBLE VISION: 0
SEIZURES: 0
NAUSEA: 0
DIARRHEA: 1
HEADACHES: 0
VOMITING: 0
BRUISES/BLEEDS EASILY: 1
PALPITATIONS: 0
NAUSEA: 0
BRUISES/BLEEDS EASILY: 0
PALPITATIONS: 0
BLURRED VISION: 0
STRIDOR: 0
DIARRHEA: 0
NAUSEA: 0
COUGH: 0
DRY SKIN: 1
BRUISES/BLEEDS EASILY: 0
MYALGIAS: 0
BLOOD IN STOOL: 0
DIZZINESS: 0
VOMITING: 0
SHORTNESS OF BREATH: 0
BRUISES/BLEEDS EASILY: 0
WEIGHT LOSS: 1
MYALGIAS: 1
COUGH: 0
EYE REDNESS: 0
VOMITING: 1
NAUSEA: 0
FEVER: 0
SKIN LESIONS: 1
CHILLS: 0
COUGH: 0
SKIN LESIONS: 1
WEAKNESS: 1
MYALGIAS: 1
FEVER: 0
CONSTIPATION: 0
HEADACHES: 0
HEADACHES: 0
DIARRHEA: 1
COUGH: 0
BLOOD IN STOOL: 0
COUGH: 0
ABDOMINAL PAIN: 0
FEVER: 0
DIARRHEA: 1
COUGH: 0
NAUSEA: 0
BLOOD IN STOOL: 0
BLOOD IN STOOL: 0
HEARTBURN: 0
BLOOD IN STOOL: 0
COUGH: 0
HEMOPTYSIS: 0
COUGH: 0
DRY SKIN: 1
FEVER: 0
SPEECH CHANGE: 0
BLOOD IN STOOL: 0
BLOOD IN STOOL: 0
PALPITATIONS: 0
FALLS: 0
VOMITING: 0
ABDOMINAL PAIN: 1
BLURRED VISION: 0
FALLS: 0
TINGLING: 0
CHILLS: 0
PHOTOPHOBIA: 0
PHOTOPHOBIA: 0
DIZZINESS: 0
ABDOMINAL PAIN: 0
CHILLS: 0
COUGH: 0
DIZZINESS: 0
BRUISES/BLEEDS EASILY: 0
NAUSEA: 0
SHORTNESS OF BREATH: 0
PALPITATIONS: 0
PALPITATIONS: 0
FALLS: 0
CONSTIPATION: 0
DIZZINESS: 0
ABDOMINAL PAIN: 0
HEMOPTYSIS: 0
NAUSEA: 0
CHILLS: 0
ABDOMINAL PAIN: 0
SHORTNESS OF BREATH: 0
ABDOMINAL PAIN: 0
FEVER: 0
VOMITING: 1
CONSTIPATION: 0
MYALGIAS: 1
FATIGUES EASILY: 1
HEADACHES: 0
DIZZINESS: 0
WEAKNESS: 1
FEVER: 0
BLURRED VISION: 0
NECK PAIN: 0
CONSTIPATION: 0
DIARRHEA: 1
PALPITATIONS: 0
NAUSEA: 0
NECK PAIN: 0
DIZZINESS: 0
WEAKNESS: 1
TINGLING: 0
PHOTOPHOBIA: 0
COUGH: 0
ABDOMINAL PAIN: 1
ABDOMINAL PAIN: 0
TINGLING: 0
PHOTOPHOBIA: 0
WEAKNESS: 1
NAUSEA: 1
BLOOD IN STOOL: 0
BRUISES/BLEEDS EASILY: 0
DIZZINESS: 0
BRUISES/BLEEDS EASILY: 1
FALLS: 0
BACK PAIN: 0

## 2020-01-01 ASSESSMENT — PATIENT HEALTH QUESTIONNAIRE - PHQ9
SUM OF ALL RESPONSES TO PHQ9 QUESTIONS 1 AND 2: 0
SUM OF ALL RESPONSES TO PHQ9 QUESTIONS 1 AND 2: 0
1. LITTLE INTEREST OR PLEASURE IN DOING THINGS: NOT AT ALL
SUM OF ALL RESPONSES TO PHQ9 QUESTIONS 1 AND 2: 0
2. FEELING DOWN, DEPRESSED, IRRITABLE, OR HOPELESS: NOT AT ALL
1. LITTLE INTEREST OR PLEASURE IN DOING THINGS: NOT AT ALL
CLINICAL INTERPRETATION OF PHQ2 SCORE: 0
1. LITTLE INTEREST OR PLEASURE IN DOING THINGS: NOT AT ALL
2. FEELING DOWN, DEPRESSED, IRRITABLE, OR HOPELESS: NOT AT ALL
2. FEELING DOWN, DEPRESSED, IRRITABLE, OR HOPELESS: NOT AT ALL

## 2020-01-01 ASSESSMENT — ACTIVITIES OF DAILY LIVING (ADL)
CONTINENCE_REQUIRES_ASSISTANCE: 1
EATING_REQUIRES_ASSISTANCE: 1
PHYSICAL_TRANSFER_REQUIRES_ASSISTANCE: 1
DRESSING_REQUIRES_ASSISTANCE: 1
BATHING_REQUIRES_ASSISTANCE: 1
DRESSING_REQUIRES_ASSISTANCE: 1
BATHING_REQUIRES_ASSISTANCE: 1
AMBULATION_REQUIRES_ASSISTANCE: 1
AMBULATION_REQUIRES_ASSISTANCE: 1
MONEY MANAGEMENT (EXPENSES/BILLS): TOTALLY DEPENDENT
EATING_REQUIRES_ASSISTANCE: 1
PHYSICAL_TRANSFER_REQUIRES_ASSISTANCE: 1
TOILETING: INDEPENDENT
CONTINENCE_REQUIRES_ASSISTANCE: 1

## 2020-01-01 ASSESSMENT — FIBROSIS 4 INDEX
FIB4 SCORE: 5.19
FIB4 SCORE: 4.58
FIB4 SCORE: 2.55
FIB4 SCORE: 2.45
FIB4 SCORE: 2.19
FIB4 SCORE: 2.47
FIB4 SCORE: 2.55
FIB4 SCORE: 5.01
FIB4 SCORE: 2.27
FIB4 SCORE: 3.64
FIB4 SCORE: 3.64
FIB4 SCORE: 3.27
FIB4 SCORE: 2.19

## 2020-01-01 ASSESSMENT — SOCIAL DETERMINANTS OF HEALTH (SDOH): ACTIVE STRESSOR - NO STRESS FACTORS: 1

## 2020-01-01 ASSESSMENT — GAIT ASSESSMENTS
GAIT LEVEL OF ASSIST: MINIMAL ASSIST
GAIT LEVEL OF ASSIST: UNABLE TO PARTICIPATE
DEVIATION: SHUFFLED GAIT;BRADYKINETIC;DECREASED BASE OF SUPPORT
GAIT LEVEL OF ASSIST: UNABLE TO PARTICIPATE
GAIT LEVEL OF ASSIST: UNABLE TO PARTICIPATE
ASSISTIVE DEVICE: HAND HELD ASSIST
DISTANCE (FEET): 75
ASSISTIVE DEVICE: FRONT WHEEL WALKER

## 2020-01-01 ASSESSMENT — PAIN SCALES - GENERAL
PAINLEVEL: NO PAIN
PAINLEVEL: 4=SLIGHT-MODERATE PAIN

## 2020-01-01 ASSESSMENT — COPD QUESTIONNAIRES
DO YOU EVER COUGH UP ANY MUCUS OR PHLEGM?: NO/ONLY WITH OCCASIONAL COLDS OR INFECTIONS
DURING THE PAST 4 WEEKS HOW MUCH DID YOU FEEL SHORT OF BREATH: NONE/LITTLE OF THE TIME
COPD SCREENING SCORE: 4
HAVE YOU SMOKED AT LEAST 100 CIGARETTES IN YOUR ENTIRE LIFE: YES

## 2020-01-01 ASSESSMENT — PULMONARY FUNCTION TESTS: FVC: 1.7

## 2020-01-01 ASSESSMENT — PAIN DESCRIPTION - DESCRIPTORS: DESCRIPTORS: ACHING

## 2020-01-28 PROBLEM — C20 RECTAL CANCER (HCC): Status: ACTIVE | Noted: 2020-01-01

## 2020-01-28 NOTE — PROGRESS NOTES
On 1/28/2020 at 1116 this ONN called patient. Introduced self and explained role of nurse navigator as added resource and support. Patient states that she has a very good support system. Patient states she has four daughters and multiple friends who would be able to assist patient as needed. Patient denies concerns with transportation or finances at this time. Patient states she has been seen by Dr. Matthew and Dr. Rondon. Patient is aware of upcoming appointment on 1/31/2020 with Dr. Chappell. Patient states that she has had a lot of appointments and has continued to work around her scheduled appointments. 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 Patient states that she does not have questions or concerns right now. Patient took this ONN's phone number. This ONN agreed to follow-up with patient via telephone after patient sees Dr. Chappell on Friday 1/31/2020. Patient agreeable. This ONN encouraged patient to call this ONN as needed as questions or concerns arise.   
yes

## 2020-01-31 NOTE — CONSULTS
RADIATION ONCOLOGY CONSULT    DATE OF SERVICE: 1/31/2020    IDENTIFICATION: A 80 y.o. female with a newly diagnosed T3N0 rectal adenocarcinoma.  She is here at the kind request of Dr. Rondon.      HISTORY OF PRESENT ILLNESS: Patient's history dates back to recently when she underwent a fit test that was positive.  She noted some altered bowel function as well this prompted a colonoscopy and this was performed on 1/9/2020.  This revealed a single sessile 3 mm polyp at the sigmoid colon at 20 cm from the anus polypectomy was performed in addition an infiltrating fungating bleeding 30 mm mass of malignant appearance was found in the rectum at a distance between 8 and 11 cm from the anus this caused partial obstruction.  Pathology revealed at least intramucosal adenocarcinoma, moderately differentiated with features with submucosal invasion.  CT scan of the chest abdomen and pelvis on 1/9/2020 revealed a rectal mass no definite evidence for metastatic disease but there was an indeterminate 4 mm left hepatic lobe lesion.  There were bilateral hypodense renal masses likely complex cyst if clinically indicated renal protocol CT or MRI is recommended for further evaluation per ACR guidelines.  Rectal protocol MRI scan 1/23/2020 showed a T3N0 rectal carcinoma the spiculation of the perirectal fat is very close to the mesorectal fascia and the uterus anteriorly but no definite evidence of invasion.  She has been seen by Dr. Matthew and Dr. Rondon and is here to discuss radiation therapy in her overall treatment plan.    PAST MEDICAL HISTORY:   Past Medical History:   Diagnosis Date   • Acute pain of left knee 10/26/2017   • Leg swelling 10/26/2017   • Osteoarthritis 6/11/2018   • Rectal cancer (HCC)    • Right knee injury 4/4/2013       PAST SURGICAL HISTORY:  Past Surgical History:   Procedure Laterality Date   • CATARACT PHACO WITH IOL  1/13/2014    Performed by Samuel Vega M.D. at [facility]   • CATARACT  PHACO WITH IOL  2013    Performed by Samuel Vega M.D. at [facility]   • APPENDECTOMY     • COLONOSCOPY WITH BIOPSY     • TONSILLECTOMY         GYNECOLOGICAL STATUS:   & Para:   : 3, Para: 3 and Number of Interrupted Pregnancies: 0    Menopause Status: Post    Reason For Menopause: Natural    Gynecological comments: HRT -never used      CURRENT MEDICATIONS:  Current Outpatient Medications   Medication Sig Dispense Refill   • multivitamin (THERAGRAN) Tab Take 1 Tab by mouth every day.     • ibuprofen (MOTRIN) 200 MG Tab Take 200 mg by mouth every 6 hours as needed.     • Cholecalciferol (VITAMIN D3) 2000 units Tab Take  by mouth.       No current facility-administered medications for this encounter.        ALLERGIES:    Patient has no known allergies.    FAMILY HISTORY:    Family History   Problem Relation Age of Onset   • Hypertension Mother    • Cancer Father         colon   • No Known Problems Brother    • No Known Problems Daughter    • No Known Problems Daughter    • No Known Problems Daughter    • Diabetes Neg Hx    • Heart Disease Neg Hx    • Stroke Neg Hx    [unfilled]        SOCIAL HISTORY:     reports that she quit smoking about 30 years ago. Her smoking use included cigarettes. She has a 30.00 pack-year smoking history. She has never used smokeless tobacco. She reports current alcohol use. She reports that she does not use drugs.   Patient is , has 3 children and lives in [place], [place]. Patient works as a caregiver.  Patient lives by herself but is very close to her 3 children.    REVIEW OF SYSTEMS: Pertinent positives consist of blood in stool  The rest of the review of systems is negative and has been reviewed by me. It is in the nursing note dated 2020 in [facility]    Pain Scale: 0-10  Pain Assessement: 0/10  Pain Location, Orientation and Scale: no complaints of pain    PHYSICAL EXAM:    0= Fully active, able to carry on all pre-disease performance without  "restriction.  Vitals:    01/31/20 1314   BP: 151/82   Pulse: 71   Temp: 36.2 °C (97.2 °F)   SpO2: 96%   Weight: 76.6 kg (168 lb 14.4 oz)   Height: 1.6 m (5' 3\")   Pain Score: No pain        GENERAL: Well-appearing alert and oriented x3 in no apparent distress  Rectal: No external hemorrhoids there is no masses appreciated in the rectal vault I cannot appreciate the distal extent of the tumor on palpation.  HEENT:  Pupils are equal, round, and reactive to light.  Extraocular muscles   are intact. Sclerae nonicteric.  Conjunctivae pink.  Oral cavity, tongue   protrudes midline.   NECK:   No peripheral adenopathy of the neck, supraclavicular fossa or axillae   bilaterally.  LUNGS:  Clear to ascultation   HEART:  Regular rate and rhythm.  No murmur appreciated  ABDOMEN:  Soft. No evidence of hepatosplenomegaly.    EXTREMITIES:  Without Edema.  NEUROLOGIC:  Cranial nerves II through XII were intact. Normal stance and gait motor and sensory grossly within normal limits                IMPRESSION:    A 80 y.o. with T3N0 adenocarcinoma of the rectum status post biopsy.      RECOMMENDATIONS:   I discussed with the patient and her daughter that I am recommending concurrent chemoradiation therapy in the neoadjuvant setting prior to surgery for definitive management.  I've described the details of radiation along with the side effects both acute and chronic, including but not exclusive to fatigue, skin reaction, local soreness, swelling, diarrhea change in bowel habits.  Possible urinary dysfunction and vaginal irritation.  Ample time was allowed for questions, and patient understands.    Patient is scheduled for simulation next week to get started soon thereafter.    Thank you for the opportunity to participate in her care.  If any questions or comments, please do not hesitate in calling.    Please note that this dictation was created using voice recognition software. I have made every reasonable attempt to correct obvious " errors, but I expect that there are errors of grammar and possibly content that I did not discover before finalizing the note.

## 2020-01-31 NOTE — NON-PROVIDER
"Patient was seen today in clinic with Dr. Chappell for Rectal Cancer.  Vitals signs and weight were obtained and pain assessment was completed.  Allergies and medications were reviewed with the patient.  Review of systems completed.     Vitals/Pain:  Vitals:    01/31/20 1314   BP: 151/82   Pulse: 71   Temp: 36.2 °C (97.2 °F)   SpO2: 96%   Weight: 76.6 kg (168 lb 14.4 oz)   Height: 1.6 m (5' 3\")   Pain Score: No pain        Allergies:   Patient has no known allergies.    Current Medications:  Current Outpatient Medications   Medication Sig Dispense Refill   • multivitamin (THERAGRAN) Tab Take 1 Tab by mouth every day.     • ibuprofen (MOTRIN) 200 MG Tab Take 200 mg by mouth every 6 hours as needed.     • Cholecalciferol (VITAMIN D3) 2000 units Tab Take  by mouth.       No current facility-administered medications for this encounter.          PCP:  No primary care provider on file.        Mariel Aden R.N.  "

## 2020-02-03 NOTE — PROGRESS NOTES
On 2/3/2020 this ONN met with patient in Radiation Oncology. Patient states she is doing well. Patient recalls speaking with this ONN on the phone. Patient states everything is going well so far. Patient states she understands plan of care and denies questions or concerns. Patient rates distress at 2/10. Patient states her distress is generalized and not caused by anything specific as it relates to treatment. Patient denies transportation or financial concerns. Patient states she drives herself and only lives in [place]. Patient states if she is too sick to drive then patient will have one of her four daughters drive her. This ONN encouraged patient to call this ONN as needed. Patient confirms that she has this ONN's contact info.

## 2020-02-13 NOTE — ON TREATMENT VISIT
ON TREATMENT  NOTE  RADIATION ONCOLOGY DEPARTMENT    Patient name:  Elle Barksdale    Primary Physician:  Pcp Pt States None MRN: [id number]  CSN: [id number]   Referring physician:  Alcon Rondon M.D. : 1940, 80 y.o.     ENCOUNTER DATE:  20    DIAGNOSIS:    Rectal cancer (HCC)  Staging form: Colon and Rectum, AJCC 8th Edition  - Clinical: Stage IIA (cT3, cN0, cM0) - Signed by Carla Chappell M.D. on 2020  Total positive nodes: 0      TREATMENT SUMMARY:  [facility] Treatment Information        Some values may be hidden. Unless noted otherwise, only the newest values recorded on each date are displayed.         [facility] Treatment Summary 20   Course First Treatment Date 2020   Course Last Treatment Date 2020   Pelvis Plan from Course [email or web address]_pelvis   Fraction 2 of 25   Elapsed Course Days 1 @    Prescribed Fraction Dose 180 cGy   Prescribed Total Dose 4,500 cGy   Pelvis Reference Point from Course [email or web address]_pelvis   Elapsed Course Days 1 @    Session Dose 180 cGy   Total Dose 360 cGy   Pelvis CP Reference Point from Course [email or web address]_pelvis   Elapsed Course Days 1 @    Session Dose 187 cGy   Total Dose 374 cGy             SUBJECTIVE:  Doing well no complaints        VITAL SIGNS:  KPS: 100, Fully active, able to carry on all pre-disease performed without restriction (ECOG equivalent 0)  Encounter Vitals  Temperature: 36.1 °C (96.9 °F)  Temp src: Temporal  Blood Pressure : (!) 169/89  Pulse: 81  Pulse Oximetry: 95 %  Weight: 76.6 kg (168 lb 14.4 oz)  Pain Score: No pain  Pain Assessment 2020   Pain Assessment Denies Pain Denies Pain -   Pain Score 0 0 0   Some recent data might be hidden          PHYSICAL EXAM:  No change      Toxicity Assessment 2020   Toxicity Assessment Female Pelvis   Fatigue (lethargy, malaise, asthenia) None   Radiation Dermatitis None   Anorexia None   Colitis None   Constipation None   Dehydration None   Diarrhea  w/o Colostomy None   Flatulence None   Nausea None   Vomiting None   RT - Pain due to RT None   Dysuria (painful urination) None   Urinary Urgency None   Bladder Spasms Absent   Incontinence None   Urinary Retention Normal   Vaginitis (not due to infection) None   Vaginal Bleeding None   Vaginal Dryness Normal           IMPRESSION:  Cancer Staging  Rectal cancer (HCC)  Staging form: Colon and Rectum, AJCC 8th Edition  - Clinical: Stage IIA (cT3, cN0, cM0) - Signed by Carla Chappell M.D. on 1/28/2020      PLAN:  No change in treatment plan    Disposition:  Treatment plan reviewed. Questions answered. Continue therapy outlined.     Carla Chappell M.D.    No orders of the defined types were placed in this encounter.

## 2020-02-19 NOTE — ON TREATMENT VISIT
ON TREATMENT  NOTE  RADIATION ONCOLOGY DEPARTMENT    Patient name:  Elle Barksdale    Primary Physician:  Pcp Pt States None MRN: [id number]  CSN: [id number]   Referring physician:  Alcon Rondon M.D. : 1940, 80 y.o.     ENCOUNTER DATE:  20    DIAGNOSIS:    Rectal cancer (HCC)  Staging form: Colon and Rectum, AJCC 8th Edition  - Clinical: Stage IIA (cT3, cN0, cM0) - Signed by Carla Chappell M.D. on 2020  Total positive nodes: 0      TREATMENT SUMMARY:  [facility] Treatment Information        Some values may be hidden. Unless noted otherwise, only the newest values recorded on each date are displayed.         [facility] Treatment Summary 20   Course First Treatment Date 2020   Course Last Treatment Date 2020   Pelvis Plan from Course [email or web address]_pelvis   Fraction 6 of 25   Elapsed Course Days 8 @    Prescribed Fraction Dose 180 cGy   Prescribed Total Dose 4,500 cGy   Pelvis Reference Point from Course [email or web address]_pelvis   Elapsed Course Days  @ [id number]   Session Dose 180 cGy   Total Dose 1,080 cGy   Pelvis CP Reference Point from Course C1_pelvis   Elapsed Course Days 8 @ [id number]   Session Dose 187 cGy   Total Dose 1,122 cGy             SUBJECTIVE:  No complaints        VITAL SIGNS:  KPS: 100, Fully active, able to carry on all pre-disease performed without restriction (ECOG equivalent 0)  Encounter Vitals  Temperature: 35.9 °C (96.6 °F)  Temp src: Temporal  Blood Pressure : 152/91  Pulse: 82  Pulse Oximetry: 96 %  Weight: 75.9 kg (167 lb 6.4 oz)  Pain Score: No pain  Pain Assessment 2020   Pain Assessment Denies Pain Denies Pain Denies Pain -   Pain Score 0 0 0 0   Some recent data might be hidden          PHYSICAL EXAM:  No change      Toxicity Assessment 2020   Toxicity Assessment Female Pelvis Female Pelvis   Fatigue (lethargy, malaise, asthenia) None None   Radiation Dermatitis None None   Anorexia None None   Colitis None  None   Constipation None None   Dehydration None None   Diarrhea w/o Colostomy None None   Flatulence None None   Nausea Able to eat None   Proctitis None -   Vomiting None None   RT - Pain due to RT None None   Tumor Pain (onset or exacerbation of tumor pain due to treatment) None -   Dysuria (painful urination) None None   Urinary Frequency Normal -   Urinary Urgency None None   Bladder Spasms Absent Absent   Incontinence None None   Urinary Retention Normal Normal   Vaginitis (not due to infection) None None   Vaginal Bleeding None None   Vaginal Dryness Normal Normal           IMPRESSION:  Cancer Staging  Rectal cancer (HCC)  Staging form: Colon and Rectum, AJCC 8th Edition  - Clinical: Stage IIA (cT3, cN0, cM0) - Signed by Carla Chappell M.D. on 1/28/2020      PLAN:  No change in treatment plan    Disposition:  Treatment plan reviewed. Questions answered. Continue therapy outlined.     Carla Chappell M.D.    No orders of the defined types were placed in this encounter.

## 2020-02-19 NOTE — PROGRESS NOTES
"Nutrition Services: RD Consultation/ New Chemoradiation Start  80 year old female with diagnosis of Rectal Cancer.       Past Medical History:   Diagnosis Date   • Acute pain of left knee 10/26/2017   • Leg swelling 10/26/2017   • Osteoarthritis 6/11/2018   • Rectal cancer (HCC)    • Right knee injury 4/4/2013       RD met with pt to assess current intake, appetite, and nutritional status.  Pt presents to appointment alone. Pt appears nourished. States is doing well so far. Confirms is taking Xeloda 3 tablets 2x per day. Mentions appetite is not as strong, denies any food allergies or avoidances. Mentions ate a burger with onions yesterday and this caused some stomach discomfort, though denies loose stool. Denies any changes in weight, confirms is usually around 168 lbs. Pt did not express any further nutrition-related questions or concerns at this time.     Assessment:  • Pertinent Labs: glucose 100, creatinine 0.49 (1/9/20)  • Pertinent Meds: xeloda, motrin, MVI  • Weight: 167 lbs, today during OTV  • Height: 5'3\"  • BMI: 29; Overweight classification    Weight History from Chart:  168 lbs on 1/31/2020  173 lbs on 9/12/2019  170 lbs on 3/1/2019    Weight Change/Malnutrition Risk: Weight appears to have been relatively stable within past ~ 11 months, pt confirms no recent change in weight. Pt does not present with malnutrition at this time.     Plan/Recommendations:  • RD introduced self and explained role during treatment process.  • Provided and discussed handout regarding general nutrition guidelines as well as nutritional recommendations for patient's with Colorectal cancer, including tips/tricks should side effects of tx occur.   • Reinforced plant-based balanced diet.   • Increase meal and snack frequency to 4-6 x/day to promote weight maintenance  • Focus on high calorie and protein foods, fruits and vegetables with all meals/snacks - handout provided.    Pt reports understanding and was receptive to " information provided.   RD provided contact information. Encouraged pt to reach out as questions/concerns arise.   RD following and to make further recommendations as indicated.  [phone]

## 2020-02-26 NOTE — ON TREATMENT VISIT
ON TREATMENT  NOTE  RADIATION ONCOLOGY DEPARTMENT    Patient name:  Elle Barksdale    Primary Physician:  Pcp Pt States None MRN: [id number]  CSN: [id number]   Referring physician:  Alcon Rondon M.D. : 1940, 80 y.o.     ENCOUNTER DATE:  20    DIAGNOSIS:    Rectal cancer (HCC)  Staging form: Colon and Rectum, AJCC 8th Edition  - Clinical: Stage IIA (cT3, cN0, cM0) - Signed by Carla Chappell M.D. on 2020  Total positive nodes: 0      TREATMENT SUMMARY:  Aria Treatment Information        Some values may be hidden. Unless noted otherwise, only the newest values recorded on each date are displayed.         Aria Treatment Summary 20   Course First Treatment Date 2020   Course Last Treatment Date 2020   Pelvis Plan from Course C1_pelvis   Fraction    Elapsed Course Days 15 @ [id number]   Prescribed Fraction Dose 180 cGy   Prescribed Total Dose 4,500 cGy   Pelvis Reference Point from Course C1_pelvis   Elapsed Course Days 15 @ [id number]   Session Dose 180 cGy   Total Dose 1,980 cGy   Pelvis CP Reference Point from Course C1_pelvis   Elapsed Course Days 15 @ [id number]   Session Dose 187 cGy   Total Dose 2,058 cGy             SUBJECTIVE:  Doing well no complaints        VITAL SIGNS:  KPS: 100, Fully active, able to carry on all pre-disease performed without restriction (ECOG equivalent 0)  Encounter Vitals  Temperature: 36.4 °C (97.5 °F)  Temp src: Temporal  Blood Pressure : 135/78  Pulse: 85  Pulse Oximetry: 96 %  Weight: 77.3 kg (170 lb 8 oz)  Pain Score: No pain  Pain Assessment 2020   Pain Assessment Denies Pain Denies Pain Denies Pain   Pain Score 0 0 0   Some recent data might be hidden          PHYSICAL EXAM:  No change      Toxicity Assessment 2020   Toxicity Assessment Female Pelvis Female Pelvis Female Pelvis   Fatigue (lethargy, malaise, asthenia) None None None   Radiation Dermatitis None None None    Anorexia None None None   Colitis None None None   Constipation None None None   Dehydration None None None   Diarrhea w/o Colostomy None None None   Flatulence None None None   Nausea None Able to eat None   Proctitis None None -   Vomiting None None None   RT - Pain due to RT None None None   Tumor Pain (onset or exacerbation of tumor pain due to treatment) None None -   Dysuria (painful urination) None None None   Urinary Frequency Normal Normal -   Urinary Urgency None None None   Bladder Spasms Absent Absent Absent   Incontinence None None None   Urinary Retention Normal Normal Normal   Vaginitis (not due to infection) None None None   Vaginal Bleeding None None None   Vaginal Dryness Normal Normal Normal           IMPRESSION:  Cancer Staging  Rectal cancer (HCC)  Staging form: Colon and Rectum, AJCC 8th Edition  - Clinical: Stage IIA (cT3, cN0, cM0) - Signed by Carla Chappell M.D. on 1/28/2020      PLAN:  No change in treatment plan    Disposition:  Treatment plan reviewed. Questions answered. Continue therapy outlined.     Carla Chpapell M.D.    No orders of the defined types were placed in this encounter.

## 2020-03-02 NOTE — PROGRESS NOTES
Nutrition Services: Brief Update  Weight: 170 lbs, weighed on 2/26/20  Weight History:  168 lbs on 1/31/20    Weight Change: Wt stable at this time    RD able to visit pt following Xrt. States is managing. Mentions had some abdominal gas yesterday that caused discomfort, was relieved with a bit of housework. States ate a banana, vanilla yogurt with fruit, 1/2 of a bagel, and cheese yesterday-relays not feeling very hungry. Mentions appetite has likely been eating a bit less. Denies interest in Ensure or Boost supplemental drinks. Relays drinking water and flavored zero calorie Propel. Denies additional concerns/questions for RD.     Plan/Recommend:  • Discussed weight appearing stable at this time  • Encouraged physical activity to alleviate abdominal gas, pt states this was isolated incident  • Encouraged use of high calorie foods to maintain weight in light of decreased appetite. Pt likes idea of continuing butter or cream cheese to bagel and pineapple juice once per day.    RD to continue to monitor throughout treatment.  Please contact [phone]

## 2020-03-04 NOTE — ON TREATMENT VISIT
ON TREATMENT  NOTE  RADIATION ONCOLOGY DEPARTMENT    Patient name:  Elle Barksdale    Primary Physician:  Pcp Pt States None MRN: [id number]  CSN: [id number]   Referring physician:  Alcon Rondon M.D. : 1940, 80 y.o.     ENCOUNTER DATE:  20    DIAGNOSIS:    Rectal cancer (HCC)  Staging form: Colon and Rectum, AJCC 8th Edition  - Clinical: Stage IIA (cT3, cN0, cM0) - Signed by Carla Chappell M.D. on 2020  Total positive nodes: 0      TREATMENT SUMMARY:  Aria Treatment Information        Some values may be hidden. Unless noted otherwise, only the newest values recorded on each date are displayed.         Aria Treatment Summary 3/4/20   Course First Treatment Date 2020   Course Last Treatment Date 2020   Pelvis Plan from Course C1_pelvis   Fraction 16 of 25   Elapsed Course Days  @ [id number]   Prescribed Fraction Dose 180 cGy   Prescribed Total Dose 4,500 cGy   Pelvis Reference Point from Course C1_pelvis   Elapsed Course Days  @ [id number]   Session Dose 180 cGy   Total Dose 2,880 cGy   Pelvis CP Reference Point from Course C1_pelvis   Elapsed Course Days  @ [id number]   Session Dose 187 cGy   Total Dose 2,993 cGy             SUBJECTIVE:  Slight fatigue having mild diarrhea responding to Imodium and decrease in appetite with a little bit of nausea.  She is also getting a rash on her arms from the Xeloda        VITAL SIGNS:  KPS: 90, Able to carry on normal activity; minor signs or symptoms of disease (ECOG equivalent 0)  Encounter Vitals  Temperature: 37 °C (98.6 °F)  Temp src: Temporal  Blood Pressure : 126/70  Pulse: 87  Pulse Oximetry: 97 %  Weight: 76.1 kg (167 lb 12.8 oz)  Pain Score: No pain  Pain Assessment 3/4/2020 2020 2020   Pain Assessment Denies Pain Denies Pain Denies Pain   Pain Score 0 0 0   Some recent data might be hidden          PHYSICAL EXAM:  A little more fatigued otherwise no change      Toxicity Assessment 3/4/2020 2020  2/19/2020 2/12/2020   Toxicity Assessment Female Pelvis Female Pelvis Female Pelvis Female Pelvis   Fatigue (lethargy, malaise, asthenia) Increased fatigue over baseline, but not altering normal activities None None None   Radiation Dermatitis None None None None   Anorexia Loss of appetite None None None   Colitis None None None None   Constipation None None None None   Dehydration None None None None   Diarrhea w/o Colostomy None None None None   Flatulence Mild None None None   Nausea Able to eat None Able to eat None   Proctitis None None None -   Vomiting None None None None   RT - Pain due to RT - None None None   Tumor Pain (onset or exacerbation of tumor pain due to treatment) None None None -   Dysuria (painful urination) None None None None   Urinary Frequency Normal Normal Normal -   Urinary Urgency None None None None   Bladder Spasms Absent Absent Absent Absent   Incontinence None None None None   Urinary Retention Normal Normal Normal Normal   Vaginitis (not due to infection) None None None None   Vaginal Bleeding None None None None   Vaginal Dryness Normal Normal Normal Normal           IMPRESSION:  Cancer Staging  Rectal cancer (HCC)  Staging form: Colon and Rectum, AJCC 8th Edition  - Clinical: Stage IIA (cT3, cN0, cM0) - Signed by Carla Chappell M.D. on 1/28/2020      PLAN:  No change in treatment plan    Disposition:  Treatment plan reviewed. Questions answered. Continue therapy outlined.     Carla Chappell M.D.    No orders of the defined types were placed in this encounter.

## 2020-03-11 NOTE — ON TREATMENT VISIT
ON TREATMENT  NOTE  RADIATION ONCOLOGY DEPARTMENT    Patient name:  Elle Barksdale    Primary Physician:  Pcp Pt States None MRN: [id number]  CSN: [id number]   Referring physician:  Alcon Rondon M.D. : 1940, 80 y.o.     ENCOUNTER DATE:  20    DIAGNOSIS:    Rectal cancer (HCC)  Staging form: Colon and Rectum, AJCC 8th Edition  - Clinical: Stage IIA (cT3, cN0, cM0) - Signed by Carla Chappell M.D. on 2020  Total positive nodes: 0      TREATMENT SUMMARY:  Aria Treatment Information        Some values may be hidden. Unless noted otherwise, only the newest values recorded on each date are displayed.         Aria Treatment Summary 3/11/20   Course First Treatment Date 2020   Course Last Treatment Date 2020   Pelvis Plan from Course C1_pelvis   Fraction  of 25   Elapsed Course Days  @ [id number]   Prescribed Fraction Dose 180 cGy   Prescribed Total Dose 4,500 cGy   Pelvis Reference Point from Course C1_pelvis   Elapsed Course Days  @ [id number]   Session Dose 180 cGy   Total Dose 3,780 cGy   Pelvis CP Reference Point from Course C1_pelvis   Elapsed Course Days  @    Session Dose 187 cGy   Total Dose 3,928 cGy             SUBJECTIVE:  Sore rear end, diarrhea fatigue        VITAL SIGNS:  KPS: 80, Normal activity with effort; some signs or symptoms of disease (ECOG equivalent 1)  Encounter Vitals  Temperature: 36.3 °C (97.4 °F)  Temp src: Temporal  Blood Pressure : 104/54  Pulse: 79  Pulse Oximetry: 99 %  Pain Score: No pain  Pain Assessment 3/11/2020 3/4/2020 2020   Pain Assessment Denies Pain Denies Pain Denies Pain   Pain Score 0 0 0   Some recent data might be hidden          PHYSICAL EXAM:  Dry desquamation in the buttocks with a small area of moist desquamation.    Toxicity Assessment 3/11/2020 3/4/2020 2020 2020 2020   Toxicity Assessment Female Pelvis Female Pelvis Female Pelvis Female Pelvis Female Pelvis   Fatigue (lethargy,  malaise, asthenia) Increased fatigue over baseline, but not altering normal activities Increased fatigue over baseline, but not altering normal activities None None None   Radiation Dermatitis Moderate to brisk erythema or a patchy moist desquamation, mostly confined to skin folds and creases, with/without moderate edema None None None None   Anorexia Requiring IV fluids Loss of appetite None None None   Colitis None None None None None   Constipation None None None None None   Dehydration Requiring IV fluid replacement (brief) None None None None   Diarrhea w/o Colostomy Increase of <4 stools/day over pre-treatment None None None None   Flatulence None Mild None None None   Nausea No significant intake, requiring IV fluids Able to eat None Able to eat None   Proctitis - None None None -   Vomiting 1 episode in 24 hours over pretreatment None None None None   RT - Pain due to RT None - None None None   Tumor Pain (onset or exacerbation of tumor pain due to treatment) None None None None -   Dysuria (painful urination) None None None None None   Urinary Frequency Normal Normal Normal Normal -   Urinary Urgency None None None None None   Bladder Spasms Absent Absent Absent Absent Absent   Incontinence None None None None None   Urinary Retention Normal Normal Normal Normal Normal   Vaginitis (not due to infection) None None None None None   Vaginal Bleeding None None None None None   Vaginal Dryness Normal Normal Normal Normal Normal           IMPRESSION:  Cancer Staging  Rectal cancer (HCC)  Staging form: Colon and Rectum, AJCC 8th Edition  - Clinical: Stage IIA (cT3, cN0, cM0) - Signed by Carla Chappell M.D. on 1/28/2020      PLAN:  No change in treatment plan.  We will continue with treatments but will have some IV hydration as needed.  Have also recommended that they use a barrier cream on the rear end mainly Aquaphor or Eucerin cream or AMD.    Disposition:  Treatment plan reviewed. Questions answered.  Continue therapy outlined.     Carla Chappell M.D.    Orders Placed This Encounter   • Rad Onc Aria Session Summary

## 2020-03-17 NOTE — PROGRESS NOTES
Nutrition Services: Brief Update  Weight: 152 lbs, weighed today in Infusion  Weight History:  167 lbs on 3/4/20  170 lbs on 2/26/20  168 lbs on 1/31/20    Weight Change: wt down 15 lbs within past 2 weeks, which is a severe 8.9% loss.     RD able to visit pt during Infusion appointment for hydration. Pt states has been having nausea almost daily. Mentions has started drinking Boost occasionally as it is something that stays down. States will sometimes have soup too. Mentions has not had much appetite either. Reports ongoing diarrhea. Denies any other nutrition-related concerns, states is ending this Friday.     Plan/Recommend:  • RD provided samples of Boost Plus, Boost Soothe, and protein powder. Discussed instructions for protein powder in soups or other meals. Encouraged to continue with adequate hydration.   • RD to follow up with pt to ensure is doing well post-treatment given severe weight loss     RD to follow-up  Please contact [phone]

## 2020-03-17 NOTE — PROGRESS NOTES
Patient to [facility] for hydration. PIV inserted into right AC, flushed with + blood return. NS 1000ml infusing. snf through the infusion PIV infiltrated. PIV inserted into left hand, flushed with + blood return. NS finished. PIV + blood return flushed and removed. Patient off to next appointment.

## 2020-03-19 NOTE — PROGRESS NOTES
Pt arrives to [facility] for labs and hydration.  24g PIV established to L-hand and CMP/magnesium drawn.  Hydration completed.  Labs reviewed and potassium was 3.1 and creatinine 1.71 today.  Magnesium was normal at 2.3.  Informed pt of labs and she is unable to stay today for possible electrolyte replacement.  Spoke to DEAN Jones and received telephone order to give NS 1L with 40meq of KCl and check post infusion BMP tomorrow.  Pt scheduled for infusion at 8am on 3/20/20 and informed pt of appt time.  PIV removed and site wrapped with pressure dressing.  Pt dc home with daughter, Taylor.

## 2020-03-20 NOTE — PROGRESS NOTES
Patient arrived ambulatory, accompanied by daughter, to [facility] for potassium infusion. Potassium level noted to be 3.1 on 3/19/20. PIV placed, flushed easily, meli briskly. NS with 40 Meq KCL infused over 4 hours.  Tolerated well. BMP drawn and end of infusion.  PIV flushed with NS, then d/c'd. Catheter tip remained intact. Gauze and coban to site. Pt left OPIC in NAD. Post infusion potassium level = 3.8.

## 2020-03-21 PROBLEM — R65.21 SEPTIC SHOCK (HCC): Status: ACTIVE | Noted: 2020-01-01

## 2020-03-21 PROBLEM — A41.9 SEPTIC SHOCK (HCC): Status: ACTIVE | Noted: 2020-01-01

## 2020-03-21 PROBLEM — N17.9 ACUTE KIDNEY INJURY (HCC): Status: ACTIVE | Noted: 2020-01-01

## 2020-03-21 PROBLEM — K63.89 PNEUMATOSIS INTESTINALIS OF SMALL INTESTINE: Status: ACTIVE | Noted: 2020-01-01

## 2020-03-21 PROBLEM — E87.6 HYPOKALEMIA: Status: ACTIVE | Noted: 2020-01-01

## 2020-03-21 PROBLEM — N30.00 ACUTE CYSTITIS WITHOUT HEMATURIA: Status: ACTIVE | Noted: 2020-01-01

## 2020-03-21 NOTE — ANESTHESIA TIME REPORT
Anesthesia Start and Stop Event Times     Date Time Event    3/21/2020 1345 Anesthesia Start     1602 Anesthesia Stop        Responsible Staff  03/21/20    Name Role Begin End    Chapincito Robertson M.D. Anesth 1345 1602        Preop Diagnosis (Free Text):  Pre-op Diagnosis      small bowel obstruction        Preop Diagnosis (Codes):    Post op Diagnosis  Small bowel obstruction (HCC)      Premium Reason  E. Weekend    Comments:

## 2020-03-21 NOTE — OP REPORT
DATE OF SERVICE:  03/21/2020    PREOPERATIVE DIAGNOSES:  1.  Rectal cancer IIA undergoing chemotherapy and radiation.  2.  Small-bowel obstruction.  3.  Septic shock for pneumatosis intestinalis.    POSTOPERATIVE DIAGNOSES:  1.  Rectal cancer stage IIA.  2.  Small-bowel obstruction secondary to adhesions and stricture in the distal   ileum, perforation, and septic shock.    PROCEDURES:  1.  Exploratory laparotomy.  2.  Lysis of adhesions.  3.  Primary repair of jejunal perforation and stricturoplasty.    SURGEON:  Pamela Interiano MD    ANESTHESIOLOGIST:  Chapincito Robertson MD    ANESTHESIA:  GET.    ESTIMATED BLOOD LOSS:  20 mL.    SPECIMENS:  Peritoneal fluid.  Aerobic and anaerobic for cultures and   sensitivities.    FINDINGS:  Mechanical small-bowel obstruction from adhesions in the distal   ileum with underlying stricture.  Severely dilated and congested proximal   small bowel with a tiny jejunal perforation and small amount of feculent   contamination within the peritoneal cavity.  No sign of liver or peritoneal   mets.    INDICATIONS:  The patient is an 80-year-old female who was diagnosed in   January with obstructing rectal cancer and she is currently undergoing   chemotherapy and radiotherapy treatments.  She presented with severe abdominal   pain and distention.  CT finding showed small-bowel obstruction.  Clinically,   patient presented in septic shock with severe dehydration, responded with   rehydration with slight improvement with 4 liters of crystalloid within the   emergency department.  Concern on the CT findings were of ischemic bowel given   pneumatosis within the small bowel and a presenting lactic acid of 10.    Despite the patient's poor prognosis, comorbidity and immunosuppression, the   patient and the family decided to continue all lifesaving measures at this   time, and exploratory surgery was indicated.    PROCEDURE IN DETAIL:  The patient was consented preoperatively, taken to the   operating  room and placed in the supine position.  Anesthesia was induced.    She was endotracheally intubated without difficulty.  Timeout was performed   identifying correct patient, procedure.  The patient was given IV antibiotics   just prior to presenting to the OR.  The abdomen was then prepped and draped   in usual sterile fashion.  A midline periumbilical incision was made with a 10   blade.  This was deepened down through the subcutaneous fat and the midline   was sharply opened.  Upon opening the abdomen, large amount of turbid ascites   was evacuated.  Cultures were sent.  The small bowel was exteriorized.  There   was multiple dense adhesions in the terminal ileum.  When these were released,   a stricture of more than 75% of the lumen in the distal ileum was found to be   the point of mechanical obstruction with severely dilated loops proximal that   were congested and ischemic from patient's low flow state and vasopressor   support.  We began at ligament of Treitz, the small bowel was run along the   antimesenteric border of the jejunal segment, was a 1-2 mm area of   perforation.  Upon mobilizing the bowel, there was a small amount of feculent   fluid contamination.  The perforated edges appeared clean and there was no   sign of infarction surrounding the segment.  This was closed with interrupted   3-0 Vicryl suture and then imbricated with Lembert stitch using a 3-0 silk.    The remainder of the small bowel was then run 3 times proximal to distal.  The   stricture was approximately 12 cm proximal to the ileocecal valve and here a   stricturoplasty was performed by placing 2 enterotomies to either side of the   stricture and firing a linear cutting stapler with a green load through the   enterotomies performing a side-to-side anastomosis between the bowel proximal   and distal to the stricture.  These enterotomies were then closed with a TA   stapler and this staple line was then oversewn with multiple  interrupted 3-0   silk sutures.  The cut edge of the mesentery was then patched over the staple   line with multiple interrupted 3-0 silk sutures.  This segment of bowel was   then placed in the right lower quadrant and the small bowel returned in   anatomic position.  The abdomen was then irrigated out copiously until the   irrigant ran clear and there was no sign of bleeding.  The omentum placed over   the small bowel and the fascia closed with interrupted #1 Vicryl sutures   approximately 1 cm distance apart along the midline.  Skin was then closed   with surgical staples and a dry sterile dressing applied.  All lap, sponge and   instrument counts were correct at the end of the case x2.  The patient   tolerated the procedure well.  She was awakened from anesthesia, extubated,   taken to the postanesthesia care unit in critical condition.       ____________________________________     MD GEOVANY Rossi / FRANKLIN    DD:  03/21/2020 15:38:12  DT:  03/21/2020 16:47:38    D#:  [id number]  Job#:  [id number]

## 2020-03-21 NOTE — ASSESSMENT & PLAN NOTE
Initially required resuscitation with crystalloid and vasopressors  3/23  Weaned off levophed and vasopressin ggt  3/27 Zosyn day #7 of 7 (7 day course given her significant immunosuppression)

## 2020-03-21 NOTE — ANESTHESIA PROCEDURE NOTES
Airway  Date/Time: 3/21/2020 1:51 PM  Performed by: Chapincito Robertson M.D.  Authorized by: Chapincito Robertson M.D.     Location:  OR  Urgency:  Elective  Difficult Airway: No    Indications for Airway Management:  Anesthesia and airway protection  Spontaneous Ventilation: absent    Sedation Level:  Deep  Preoxygenated: Yes    Patient Position:  Sniffing  Mask Difficulty Assessment:  1 - vent by mask  Final Airway Type:  Endotracheal airway  Final Endotracheal Airway:  ETT  Cuffed: Yes    Technique Used for Successful ETT Placement:  Direct laryngoscopy  Devices/Methods Used in Placement:  Cricoid pressure and intubating stylet  Insertion Site:  Oral  Blade Type:  Sandoval  Laryngoscope Blade/Videolaryngoscope Blade Size:  2  ETT Size (mm):  7.5  Measured from:  Teeth  ETT to Teeth (cm):  21  Placement Verified by: auscultation and capnometry    Cormack-Lehane Classification:  Grade I - full view of glottis  Number of Attempts at Approach:  1

## 2020-03-21 NOTE — ANESTHESIA PROCEDURE NOTES
Arterial Line  Performed by: Chapincito Robertson M.D.  Authorized by: Chapincito Robertson M.D.

## 2020-03-21 NOTE — PROGRESS NOTES
Patient currently undergoing chemotherapy/radiation for obstructing rectal cancer  Now clinically has a small bowel obstruction, CT findings concerning for ischemic bowel  She is showing signs of septic shock, exacerbated by severe dehydration. Responding well to fluid resuscitation   Patient and family want everything done at this time  Will proceed with exploratory laparotomy

## 2020-03-21 NOTE — ANESTHESIA PROCEDURE NOTES
Arterial Line  Performed by: Chapincito Robertson M.D.  Authorized by: Chapincito Robertson M.D.     Start Time:  3/21/2020 2:30 PM  End Time:  3/21/2020 2:35 PM  Localization: ultrasound guidance and surface landmarks  Image captured, interpreted and electronically stored.  Patient Location:  OR  Indication: continuous blood pressure monitoring and blood sampling needed    Catheter Size:  20 G  Seldinger Technique?: Yes    Laterality:  Left  Site:  Radial artery  Line Secured:  Antimicrobial disc, tape and transparent dressing  Events: patient tolerated procedure well with no complications and greater than 3 attempts    Other::  Difficult placement secondary to low bp and hypovolemia

## 2020-03-21 NOTE — ASSESSMENT & PLAN NOTE
Imaging reveals evidence of a small bowel obstruction with pneumatosis involving the small bowel  Worrisome for bowel ischemia  Emergent surgical evaluation  Start empiric Zosyn  Aggressive IV fluid resuscitation

## 2020-03-21 NOTE — ED PROVIDER NOTES
ED Provider Note    CHIEF COMPLAINT  Chief Complaint   Patient presents with   • Shortness of Breath   • Abdominal Pain   • Unresponsive       HPI  Elle Barksdale is a 80 y.o. female who presents for evaluation of abdominal distention abdominal pain lethargy.  The patient has a history of rectal cancer.  She is followed by oncology and radiation oncology.  Last chemotherapy was administered orally last week.  She apparently has been increasingly weak and lethargic.  Paramedics noted this morning that the patient was obtunded.  She did not have any reported fever or cough they did a few bag-valve-mask ventilations and the patient's mental status improved.  She reports some shortness of breath abdominal distention and generally not feeling well.  No recent C-19 risk factors such as out-of-state travel or known exposures.    REVIEW OF SYSTEMS  See HPI for further details.  No hematemesis no hematochezia or melena all other systems are negative.     PAST MEDICAL HISTORY  Past Medical History:   Diagnosis Date   • Osteoarthritis 2018   • Leg swelling 10/26/2017   • Acute pain of left knee 10/26/2017   • Right knee injury 2013   • Rectal cancer (HCC)        FAMILY HISTORY  Noncontributory    SOCIAL HISTORY  Social History     Socioeconomic History   • Marital status:      Spouse name: Not on file   • Number of children: Not on file   • Years of education: Not on file   • Highest education level: Not on file   Occupational History   • Not on file   Social Needs   • Financial resource strain: Not on file   • Food insecurity     Worry: Not on file     Inability: Not on file   • Transportation needs     Medical: Not on file     Non-medical: Not on file   Tobacco Use   • Smoking status: Former Smoker     Packs/day: 1.00     Years: 30.00     Pack years: 30.00     Types: Cigarettes     Last attempt to quit:      Years since quittin.2   • Smokeless tobacco: Never Used   Substance and Sexual Activity  "  • Alcohol use: Yes     Comment: rarely; 2 glasses of wine/month    • Drug use: No   • Sexual activity: Not Currently     Comment:     Lifestyle   • Physical activity     Days per week: Not on file     Minutes per session: Not on file   • Stress: Not on file   Relationships   • Social connections     Talks on phone: Not on file     Gets together: Not on file     Attends Jewish service: Not on file     Active member of club or organization: Not on file     Attends meetings of clubs or organizations: Not on file     Relationship status: Not on file   • Intimate partner violence     Fear of current or ex partner: Not on file     Emotionally abused: Not on file     Physically abused: Not on file     Forced sexual activity: Not on file   Other Topics Concern   • Not on file   Social History Narrative    Caregiver for clients at [employer]       SURGICAL HISTORY  Past Surgical History:   Procedure Laterality Date   • CATARACT PHACO WITH IOL  1/13/2014    Performed by Samuel Vega M.D. at [facility]   • CATARACT PHACO WITH IOL  12/16/2013    Performed by Samuel Vega M.D. at [facility]   • APPENDECTOMY     • COLONOSCOPY WITH BIOPSY     • TONSILLECTOMY         CURRENT MEDICATIONS  Home Medications    **Home medications have not yet been reviewed for this encounter**         ALLERGIES  No Known Allergies    PHYSICAL EXAM  VITAL SIGNS: /63   Pulse 90   Temp 36.7 °C (98.1 °F) (Temporal)   Resp (!) 38   Ht 1.626 m (5' 4\")   Wt 70.4 kg (155 lb 3.3 oz)   SpO2 98%   BMI 26.64 kg/m²       Constitutional: Elderly ill-appearing  HENT: Normocephalic, Atraumatic, Bilateral external ears normal dry mucous membranes t, No oral exudates, Nose normal.   Eyes: PERRLA, EOMI, Conjunctiva normal, No discharge.   Neck: Normal range of motion, No tenderness, Supple, No stridor.   Lymphatic: No lymphadenopathy noted.   Cardiovascular: Normal heart rate, Normal rhythm, No murmurs, No " rubs, No gallops.   Thorax & Lungs: Shallow breath sounds bilaterally  Abdomen: Diffuse abdominal distention with tenderness no palpable hernia or mass, No pulsatile masses.   Skin: Mottled  Back: No tenderness, No CVA tenderness.   Extremities: Thready distal pulses, No edema, No tenderness, No cyanosis, No clubbing.   al: Good range of motion in all major joints. No tenderness to palpation or major deformities noted.   Neurologic: Sluggish but arousable following commands moving all extremities normal strength and sensation    Results for orders placed or performed during the hospital encounter of 03/21/20   LACTIC ACID   Result Value Ref Range    Lactic Acid 10.9 (HH) 0.5 - 2.0 mmol/L   CBC WITH DIFFERENTIAL   Result Value Ref Range    WBC 9.7 4.8 - 10.8 K/uL    RBC 3.89 (L) 4.20 - 5.40 M/uL    Hemoglobin 12.1 12.0 - 16.0 g/dL    Hematocrit 37.5 37.0 - 47.0 %    MCV 96.4 81.4 - 97.8 fL    MCH 31.1 27.0 - 33.0 pg    MCHC 32.3 (L) 33.6 - 35.0 g/dL    RDW 60.1 (H) 35.9 - 50.0 fL    Platelet Count 209 164 - 446 K/uL    MPV 11.3 9.0 - 12.9 fL    Neutrophils-Polys 76.50 (H) 44.00 - 72.00 %    Lymphocytes 0.90 (L) 22.00 - 41.00 %    Monocytes 3.50 0.00 - 13.40 %    Eosinophils 0.00 0.00 - 6.90 %    Basophils 0.00 0.00 - 1.80 %    Nucleated RBC 0.40 /100 WBC    Neutrophils (Absolute) 8.77 (H) 2.00 - 7.15 K/uL    Lymphs (Absolute) 0.09 (L) 1.00 - 4.80 K/uL    Monos (Absolute) 0.34 0.00 - 0.85 K/uL    Eos (Absolute) 0.00 0.00 - 0.51 K/uL    Baso (Absolute) 0.00 0.00 - 0.12 K/uL    NRBC (Absolute) 0.04 K/uL    Hypochromia 1+     Anisocytosis 1+     Macrocytosis 2+     Microcytosis 1+    Comp Metabolic Panel   Result Value Ref Range    Sodium 141 135 - 145 mmol/L    Potassium 3.0 (L) 3.6 - 5.5 mmol/L    Chloride 95 (L) 96 - 112 mmol/L    Co2 18 (L) 20 - 33 mmol/L    Anion Gap 28.0 (H) 7.0 - 16.0    Glucose 163 (H) 65 - 99 mg/dL    Bun 72 (HH) 8 - 22 mg/dL    Creatinine 1.98 (H) 0.50 - 1.40 mg/dL    Calcium 8.0 (L) 8.5 - 10.5  mg/dL    AST(SGOT) 30 12 - 45 U/L    ALT(SGPT) 25 2 - 50 U/L    Alkaline Phosphatase 102 (H) 30 - 99 U/L    Total Bilirubin 0.7 0.1 - 1.5 mg/dL    Albumin 1.9 (L) 3.2 - 4.9 g/dL    Total Protein 4.7 (L) 6.0 - 8.2 g/dL    Globulin 2.8 1.9 - 3.5 g/dL    A-G Ratio 0.7 g/dL   LIPASE   Result Value Ref Range    Lipase 10 (L) 11 - 82 U/L   TROPONIN   Result Value Ref Range    Troponin T 24 (H) 6 - 19 ng/L   APTT   Result Value Ref Range    APTT 26.1 24.7 - 36.0 sec   PROCALCITONIN   Result Value Ref Range    Procalcitonin 1.29 (H) <0.25 ng/mL   DIFFERENTIAL MANUAL   Result Value Ref Range    Bands-Stabs 13.90 (H) 0.00 - 10.00 %    Metamyelocytes 0.90 %    Myelocytes 4.30 %    Manual Diff Status PERFORMED    PERIPHERAL SMEAR REVIEW   Result Value Ref Range    Peripheral Smear Review see below    PLATELET ESTIMATE   Result Value Ref Range    Plt Estimation Normal    MORPHOLOGY   Result Value Ref Range    RBC Morphology Present     Polychromia 2+     Poikilocytosis 3+     Ovalocytes 1+     Schistocytes 1+     Echinocytes 2+     Spherocytes 1+     Toxic Gran Marked     Dohle Bodies Few    URINALYSIS CULTURE, IF INDICATED   Result Value Ref Range    Color Yellow     Character Turbid (A)     Specific Gravity 1.020 <1.035    Ph 6.5 5.0 - 8.0    Glucose Negative Negative mg/dL    Ketones Trace (A) Negative mg/dL    Protein 100 (A) Negative mg/dL    Bilirubin Moderate (A) Negative    Urobilinogen, Urine 1.0 Negative    Nitrite Negative Negative    Leukocyte Esterase Moderate (A) Negative    Occult Blood Large (A) Negative    Micro Urine Req Microscopic    ESTIMATED GFR   Result Value Ref Range    GFR If  29 (A) >60 mL/min/1.73 m 2    GFR If Non  24 (A) >60 mL/min/1.73 m 2   LACTIC ACID   Result Value Ref Range    Lactic Acid 5.3 (HH) 0.5 - 2.0 mmol/L   URINE MICROSCOPIC (W/UA)   Result Value Ref Range    WBC Packed (A) /hpf    RBC 5-10 (A) /hpf    Bacteria Many (A) None /hpf    Epithelial Cells Few  /hpf    Epithelial Cells Renal Few /hpf    Hyaline Cast 6-10 (A) /lpf   EKG (NOW)   Result Value Ref Range    Report       [facility] Emergency Dept.    Test Date:  2020  Pt Name:    ERNESTO CORDERO                Department: ER  MRN:        [id number]                      Room:       RD 01  Gender:     Female                       Technician: [id number]  :        [id number]                   Requested By:ALIYAH TOSCANO  Order #:    [id number]                    Reading MD:    Measurements  Intervals                                Axis  Rate:       94                           P:          80  MI:         148                          QRS:        24  QRSD:       82                           T:          -6  QT:         396  QTc:        496    Interpretive Statements  SINUS RHYTHM  EARLY PRECORDIAL R/S TRANSITION  BORDERLINE REPOLARIZATION ABNORMALITY  BORDERLINE PROLONGED QT INTERVAL  No previous ECG available for comparison       CT-ABDOMEN-PELVIS W/O   Final Result      1.  Dilated small intestine with decompression distally and decompression of the colon consistent with small bowel obstruction. There is pneumatosis involving dilated loops of small intestine consistent with bowel necrosis/ischemia.      2.  Small amount of ascites.      3.  Right lung base atelectasis/consolidation and small right pleural effusion.      This was discussed with ALIYAH TOSCANO at below the 6:00 AM on 3/21/2020.      DX-CHEST-PORTABLE (1 VIEW)   Final Result      1.  Interval placement of a right IJ central line without evidence of complication.      2.  Cardiomegaly.      DX-CHEST-PORTABLE (1 VIEW)   Final Result      Borderline cardiomegaly.        Physician procedure: Central venous catheter indication patient with septic shock and poor peripheral IV access.  Verbal and written consent was obtained.  The right anterior neck was prepped with chlorhexidine x3.  Sterile gown gloves and universal precautions and sterile  precautions were used.  5 cc of lidocaine without epinephrine was infiltrated into the skin and deeper tissues for anesthesia overlying the right anterior neck.  Using dynamic real-time vascular ultrasound the right internal jugular vein was cannulated on the first attempt.  Dark nonpulsatile blood was aspirated.  Seldinger wire was advanced without resistance tract was dilated and the catheter was placed over the wire and the wire was removed.  All ports were flushed.  Blood loss less than 1 cc no complications.  Chest x-ray confirmed placement  COURSE & MEDICAL DECISION MAKING  Pertinent Labs & Imaging studies reviewed. (See chart for details)  Patient presents here critically ill.  Once we evaluated her right was quite apparent that she likely was experiencing abdominal sepsis.  2 large-bore peripheral IVs were established.  She was given a 30 cc/kg lactated Ringer's fluid bolus.  Despite this her pressures continued to be low and therefore central venous catheter was placed and Levophed was initiated.  Patient was given IV Zosyn.  De La Garza catheter was placed.  Her work-up here suggests likely ischemic gut with bowel obstruction.  Emergent consultation with critical care medicine, internal medicine as well as surgical consult was obtained with Dr. Interiano.  I counseled the patient and the family that her prognosis is exceedingly poor given her age, comorbidities and her clinical appearance.  Despite this the family would initially like to pursue full treatment.  Patient will be admitted to the intensive care unit in guarded condition.  Prognosis is exceedingly poor    CRITICAL CARE TIME:    The patient required approximately 45 minutes worth of critical care time. This excludes any procedures. This includes time spent directly at caring for the patient, making critical medical decisions, involving consultants and speaking with the family.    FINAL IMPRESSION  1.  Septic shock requiring vasopressors  2.  Ischemic  gut  3.  Lactic acidosis  4.  Acute kidney injury  5.  Urinary tract infection         Electronically signed by: Wally Dallas M.D., 3/21/2020 9:24 AM

## 2020-03-21 NOTE — CONSULTS
DATE OF SERVICE:  03/21/2020    REASON FOR SURGERY CONSULTATION:  Bowel obstruction with pneumatosis   intestinalis, lactic acidosis.    CHIEF COMPLAINT:  Abdominal pain, distention, intractable vomiting.    HISTORY OF PRESENT ILLNESS:  The patient is an 80-year-old female with   diagnosis of obstructing rectal adenocarcinoma diagnosed in January.  She is   currently undergoing radiotherapy and oral chemotherapy with 1-week history of   lethargy, poor p.o. intake, diarrhea.  For the last day, she has been   experiencing severe abdominal distention, pain, intractable vomiting.  Patient   describes the pain as diffuse, 8/10 constant pain without modifying factors.    She is passing loose nonbloody stools.  Denies chest pain, shortness of   breath.    REVIEW OF SYSTEMS:  Positive for abdominal pain, distention, weight loss,   vomiting and diarrhea.  All other review of systems on a 10-point review   according to AMA guidelines except for that stated in the HPI are negative.    PAST MEDICAL HISTORY:  Includes rectal cancer and osteoarthritis.    SOCIAL HISTORY:  A 30-pack-year smoking history, occasional alcohol, denies   illicit drugs.    ALLERGIES:  No known drug allergies.    SURGERIES:  Open appendectomy.  Colonoscopy with biopsies in January,   tonsillectomy.    FAMILY HISTORY:  No bleeding diathesis or problems with anesthesia.    PHYSICAL EXAMINATION:  VITAL SIGNS:  Temperature of 98, pulse 90, blood pressure 110/60, respiratory   rate 32, sats 99% on 2 liters nasal cannula.  CONSTITUTIONAL:  Patient is a thin, lethargic, mild distress after being   aroused.  HEENT:  Normocephalic, atraumatic.  Pupils equal, round and reactive to light.    No scleral icterus.  Mucous membranes dry.  NECK:  Soft, no lymphadenopathy, no JVD.  CARDIOVASCULAR:  Regular rate and rhythm.  EXTREMITIES:  Warm, no edema.  PULMONARY/CHEST:  Shallow breathing.  No wheezes or stridor.  ABDOMEN:  Soft, distended tympanic.  There is tenderness  to palpation in the   right lower quadrant with localized guarding.  No palpable incarcerated   hernias.  NEUROLOGIC:  Cranial nerves II-XII grossly intact.  SKIN:  Warm, well perfused.  No petechia or rashes.    LABORATORY DATA:  White count of 9.7, hemoglobin 12, hematocrit 37, platelets   209.  Sodium is 141, potassium 3, chloride 95, bicarbonate 19, BUN 72,   creatinine 1.9, calcium 8.0.    IMAGING:  CT abdomen and pelvis shows a dilated stomach, small intestine with   a transition point, distal ileum.  Pneumatosis involving all the dilated loops   of small bowel.  The colon is completely decompressed, small amount of   ascites.    UA shows moderate bilirubin, moderate leukocyte esterase, large blood.    ASSESSMENT AND PLAN:  An 80-year-old female with stage IIA rectal cancer in   the midst of chemotherapy, radiation, prolonged illness greater than 1 week   and showing signs of severe dehydration.  CT findings and exam consistent with   obstruction.  Clinically patient is in septic shock, exacerbated by dehydration.  She has  responded well to fluid boluses given the emergency department.    Her presenting lactic acid was 10.9, now 5.3.  She is off pressors and her   blood pressure is in 110s/60s.  Discussed with the patient's daughter mixed   clinical picture and concern for reversible intestinal ischemia.  If this is the case,   Surgery is only treatment to prevent further multiorgan failure and even death.  Also discussed with the   patient and her daughter given her comorbidities, rectal cancer and current   treatment, she is high risk for perioperative complications including   infection, wound dehiscence, anastomotic leak should she need bowel resection   and despite surgical treatement she may still progress to multiorgan failure.  The patient's  daughter and the patient stated they understand the indications and risks of   surgery, but wished at this time to proceed with full measures.  Plan to take    the patient to surgery for exploratory laparotomy, possible bowel resection,   and possible ostomy.  All questions answered.       ____________________________________     MD GEOVANY Rossi / [place]    DD:  03/21/2020 12:34:52  DT:  03/21/2020 13:06:01    D#:  [id number]  Job#:  [id number]

## 2020-03-21 NOTE — OR SURGEON
Immediate Post OP Note    PreOp Diagnosis: Rectal Cancer, Small bowel obstruction, Septic shock, Pneumatosis intestinalis    PostOp Diagnosis: Rectal Cancer, Small bowel obstruction secondary to adhesion with stricture, perforation, Septic shock    Procedure(s):  LAPAROTOMY, EXPLORATORY, lysis of adhesions, small bowel resection. - Wound Class: Dirty or Infected  EXCISION, INTESTINE - Wound Class: Dirty or Infected    Surgeon(s):  Pamela Interiano M.D.    Anesthesiologist/Type of Anesthesia:  Anesthesiologist: Chapincito Robertson M.D./General    Surgical Staff:  Circulator: Jazmine Mckoy R.N.  Scrub Person: Dario Martinez    Specimens removed if any:  ID Type Source Tests Collected by Time Destination   1 : Peritoneal Fluid Body Fluid Peritoneal Fluid AEROBIC/ANAEROBIC CULTURE (SURGERY) Pamela Interiano M.D. 3/21/2020  2:16 PM        Estimated Blood Loss: 50cc    +6L crystalloid   +500 albumin  1500cc output from NGT      Findings: Mechanical small bowel obstruction from adhesions and stricture of distal ileum, Severely dilated and congested proximal small bowel, tiny jejunal perforation with small amount of contamination  No sign of liver or peritoneal mets            3/21/2020 3:27 PM Pamela Interiano M.D.

## 2020-03-21 NOTE — ANESTHESIA PREPROCEDURE EVALUATION
Relevant Problems      (+) Acute kidney injury (HCC)      Other   (+) Pneumatosis intestinalis of small intestine   (+) Rectal cancer (HCC)   (+) Septic shock (HCC)       Physical Exam    Airway   Mallampati: II  TM distance: >3 FB  Neck ROM: full       Cardiovascular - normal exam  Rhythm: regular  Rate: normal  (-) murmur     Dental - normal exam  (+) upper dentures, lower dentures         Pulmonary - normal exam  Breath sounds clear to auscultation     Abdominal    Neurological - normal exam         Other findings: NG present            Anesthesia Plan    ASA 3 (sepsis, hypovolemic shock)- EMERGENT   ASA physical status 3 criteria: other (comment)ASA physical status emergent criteria: acute deteriorating condition due to infection, acute peritonitis and sepsis    Plan - general       Airway plan will be ETT        Induction: intravenous    Postoperative Plan: Postoperative administration of opioids is intended.    Pertinent diagnostic labs and testing reviewed    Informed Consent:    Anesthetic plan and risks discussed with patient.    Use of blood products discussed with: patient whom consented to blood products.

## 2020-03-21 NOTE — CONSULTS
PULMONARY AND CRITICAL CARE MEDICINE CONSULTATION    Date of Consultation:  3/21/2020    Requesting Physician:  Wally Dallas MD    Consulting Physician:  Samuel Rodriguez MD    Reason for Consultation: Critical care management in lady with septic shock, acute kidney injury, lactic acidosis and pneumatosis of the small bowel    Chief Complaint: Abdominal pain, nausea and vomiting    History of Present Illness:    I was kindly asked to see and evaluate Elle Barksdale, a 80 y.o. female for evaluation and management of the above problem.    This lady has a history of stage IIA rectal adenocarcinoma which was diagnosed in January of this year.  She has undergone chemoradiation.  Her last radiation treatment was yesterday.  Last night she developed abdominal distention with nausea and vomiting.  She is also been experiencing worsening abdominal pain and weakness.  She presented to the emergency department.  She has been found to have lactic acidosis, acute kidney injury and evidence of a small bowel obstruction with pneumatosis of the small bowel.    Medications Prior to Admission:    No current facility-administered medications on file prior to encounter.      Current Outpatient Medications on File Prior to Encounter   Medication Sig Dispense Refill   • prochlorperazine (COMPAZINE) 10 MG Tab Take 10 mg by mouth every 6 hours as needed.     • loperamide (IMODIUM A-D) 2 MG Cap Take 2 mg by mouth 4 times a day as needed for Diarrhea.     • ondansetron (ZOFRAN ODT) 8 MG TABLET DISPERSIBLE DISSOLVE 1 TABLET BY MOUTH EVERY 12 HOURS AS NEEDED FOR NAUSEA VOMITING     • multivitamin (THERAGRAN) Tab Take 1 Tab by mouth every day.     • ibuprofen (MOTRIN) 200 MG Tab Take 200 mg by mouth every 6 hours as needed.     • Cholecalciferol (VITAMIN D3) 2000 units Tab Take  by mouth.         Current Medications:      Current Facility-Administered Medications:   •  potassium chloride in water (KCL) ivpb **Administer in ICU  only** 20 mEq, 20 mEq, Intravenous, Once, Wally Dallas M.D.  •  [DISCONTINUED] piperacillin-tazobactam (ZOSYN) 4.5 g in  mL IVPB, 4.5 g, Intravenous, Once **AND** piperacillin-tazobactam (ZOSYN) 4.5 g in  mL IVPB, 4.5 g, Intravenous, Q8HRS, Samuel Rodriguez M.D.  •  lactated ringers infusion, , Intravenous, Continuous, Samuel Rodriguez M.D.  •  norepinephrine (LEVOPHED) 8 mg in  mL Infusion, 0.5-30 mcg/min, Intravenous, Continuous **AND** vasopressin (VASOSTRICT) 20 Units in  mL Infusion, 0.03 Units/min, Intravenous, Continuous **AND** Notify physician if MAP remains less than 65 mmHg after 15 minutes of Norepinephrine and Vasopressin initiation, , , Once **AND** Initial Vasopressor Therapy for Septic Shock, , , CONTINUOUS, Samuel Rodriguez M.D.    Current Outpatient Medications:   •  prochlorperazine (COMPAZINE) 10 MG Tab, Take 10 mg by mouth every 6 hours as needed., Disp: , Rfl:   •  loperamide (IMODIUM A-D) 2 MG Cap, Take 2 mg by mouth 4 times a day as needed for Diarrhea., Disp: , Rfl:   •  ondansetron (ZOFRAN ODT) 8 MG TABLET DISPERSIBLE, DISSOLVE 1 TABLET BY MOUTH EVERY 12 HOURS AS NEEDED FOR NAUSEA VOMITING, Disp: , Rfl:   •  multivitamin (THERAGRAN) Tab, Take 1 Tab by mouth every day., Disp: , Rfl:   •  ibuprofen (MOTRIN) 200 MG Tab, Take 200 mg by mouth every 6 hours as needed., Disp: , Rfl:   •  Cholecalciferol (VITAMIN D3) 2000 units Tab, Take  by mouth., Disp: , Rfl:     Allergies:    Patient has no known allergies.    Past Surgical History:    Past Surgical History:   Procedure Laterality Date   • CATARACT PHACO WITH IOL  1/13/2014    Performed by Samuel Vega M.D. at [facility]ERY [facility] ORS   • CATARACT PHACO WITH IOL  12/16/2013    Performed by Samuel Vega M.D. at [facility] ORS   • APPENDECTOMY     • COLONOSCOPY WITH BIOPSY     • TONSILLECTOMY         Past Medical History:    Past Medical History:   Diagnosis Date   • Acute pain of  left knee 10/26/2017   • Leg swelling 10/26/2017   • Osteoarthritis 2018   • Rectal cancer (HCC)    • Right knee injury 2013       Social History:    Social History     Socioeconomic History   • Marital status:      Spouse name: Not on file   • Number of children: Not on file   • Years of education: Not on file   • Highest education level: Not on file   Occupational History   • Not on file   Social Needs   • Financial resource strain: Not on file   • Food insecurity     Worry: Not on file     Inability: Not on file   • Transportation needs     Medical: Not on file     Non-medical: Not on file   Tobacco Use   • Smoking status: Former Smoker     Packs/day: 1.00     Years: 30.00     Pack years: 30.00     Types: Cigarettes     Last attempt to quit:      Years since quittin.2   • Smokeless tobacco: Never Used   Substance and Sexual Activity   • Alcohol use: Yes     Comment: rarely; 2 glasses of wine/month    • Drug use: No   • Sexual activity: Not Currently     Comment:     Lifestyle   • Physical activity     Days per week: Not on file     Minutes per session: Not on file   • Stress: Not on file   Relationships   • Social connections     Talks on phone: Not on file     Gets together: Not on file     Attends Jewish service: Not on file     Active member of club or organization: Not on file     Attends meetings of clubs or organizations: Not on file     Relationship status: Not on file   • Intimate partner violence     Fear of current or ex partner: Not on file     Emotionally abused: Not on file     Physically abused: Not on file     Forced sexual activity: Not on file   Other Topics Concern   • Not on file   Social History Narrative    Caregiver for clients at [employer]       Family History:    Family History   Problem Relation Age of Onset   • Hypertension Mother    • Cancer Father         colon   • No Known Problems Brother    • No Known Problems Daughter    • No Known Problems  "Daughter    • No Known Problems Daughter    • Diabetes Neg Hx    • Heart Disease Neg Hx    • Stroke Neg Hx        Review of System:    Review of Systems   Constitutional: Positive for malaise/fatigue. Negative for chills and fever.   HENT: Negative for nosebleeds and tinnitus.    Eyes: Negative for pain, discharge and redness.   Respiratory: Positive for shortness of breath. Negative for cough, hemoptysis, sputum production and stridor.    Cardiovascular: Positive for leg swelling. Negative for chest pain and palpitations.   Gastrointestinal: Positive for abdominal pain, diarrhea, nausea and vomiting.   Genitourinary: Negative for dysuria, hematuria and urgency.   Musculoskeletal: Negative for myalgias and neck pain.   Skin: Negative for rash.   Neurological: Negative for speech change, focal weakness, seizures and headaches.   Endo/Heme/Allergies: Does not bruise/bleed easily.   Psychiatric/Behavioral: Negative for hallucinations, substance abuse and suicidal ideas. The patient is not nervous/anxious.        Physical Examination:    BP (!) 82/57   Pulse 96   Temp 36.7 °C (98.1 °F) (Temporal)   Resp (!) 35   Ht 1.626 m (5' 4\")   Wt 70.4 kg (155 lb 3.3 oz)   SpO2 93%   BMI 26.64 kg/m²   Physical Exam   HENT:   Head: Normocephalic and atraumatic.   Right Ear: External ear normal.   Left Ear: External ear normal.   Nose: Nose normal.   Mouth/Throat: No oropharyngeal exudate.   Dry mucous membranes   Eyes: Pupils are equal, round, and reactive to light. Conjunctivae are normal. Right eye exhibits no discharge. Left eye exhibits no discharge.   Neck: Normal range of motion. Neck supple. No tracheal deviation present.   Cardiovascular: Intact distal pulses. Exam reveals no gallop.   No murmur heard.  Sinus rhythm   Pulmonary/Chest: No stridor. She has no wheezes. She has no rales.   Abdominal: She exhibits distension. There is abdominal tenderness. There is no rebound.   Hypoactive bowel sounds   Musculoskeletal:   "       General: Edema (2-3+ in the lower extremities) present. No tenderness.      Comments: No clubbing or cyanosis   Neurological:   Awake and alert.  No focal weakness.   Skin: She is not diaphoretic.       Laboratory Data:        Recent Labs     03/21/20  0919   WBC 9.7   RBC 3.89*   HEMOGLOBIN 12.1   HEMATOCRIT 37.5   MCV 96.4   MCH 31.1   MCHC 32.3*   RDW 60.1*   PLATELETCT 209   MPV 11.3     Recent Labs     03/19/20  1430 03/20/20  1232 03/21/20  0919   SODIUM 136 137 141   POTASSIUM 3.1* 3.8 3.0*   CHLORIDE 92* 99 95*   CO2 24 25 18*   GLUCOSE 140* 107* 163*   BUN 68* 60* 72*   CREATININE 1.71* 1.19 1.98*   CALCIUM 8.3* 7.7* 8.0*                   Imaging:    I personally viewed the CXR and CT scan images as well as reviewed the radiology interpretation reports.    CT-ABDOMEN-PELVIS W/O   Final Result      1.  Dilated small intestine with decompression distally and decompression of the colon consistent with small bowel obstruction. There is pneumatosis involving dilated loops of small intestine consistent with bowel necrosis/ischemia.      2.  Small amount of ascites.      3.  Right lung base atelectasis/consolidation and small right pleural effusion.      This was discussed with ALIYAH TOSCANO at below the 6:00 AM on 3/21/2020.      DX-CHEST-PORTABLE (1 VIEW)   Final Result      1.  Interval placement of a right IJ central line without evidence of complication.      2.  Cardiomegaly.      DX-CHEST-PORTABLE (1 VIEW)   Final Result      Borderline cardiomegaly.          Assessment and Plan:    * Septic shock (HCC)  Assessment & Plan  This is Septic shock Present on admission  SIRS criteria identified on my evaluation include: Tachypnea, with respirations greater than 20 per minute  Source is intra-abdominal  Presentation includes: Severe sepsis present and persistent hypotension after 30 ml/kg completed.   Despite appropriate fluid resuscitation with crystalloid given per sepsis guidelines, the patient remains  hypotensive with systolic blood pressure less than 90 or MAP less than 65  Hemodynamic support with additional fluids and IV vasopressors as needed to maintain a SBP of 90 or MAP of 65  IV antibiotics as appropriate for source of sepsis  Reassessment: I have reassessed the patient's hemodynamic status  Trend lactic acid  Start broad-spectrum source directed antibiotics    Pneumatosis intestinalis of small intestine  Assessment & Plan  Imaging reveals evidence of a small bowel obstruction with pneumatosis involving the small bowel  Worrisome for bowel ischemia  Emergent surgical evaluation  Start empiric Zosyn  Aggressive IV fluid resuscitation    Acute cystitis without hematuria  Assessment & Plan  Culture urine and blood  Start empiric Zosyn    Acute kidney injury (HCC)  Assessment & Plan  Due to sepsis and intravascular volume depletion  Aggressive fluid resuscitation  Monitor urine output and renal function  Renal dose medications and avoid nephrotoxins    Rectal cancer (HCC)- (present on admission)  Assessment & Plan  Stage II A rectal adenocarcinoma diagnosed in January  S/P chemoradiation    I have assessed and reassessed her respiratory status, blood pressure, hemodynamics, cardiovascular status, urine output and response to aggressive IV fluid resuscitation.  She is at high risk for worsening respiratory, cardiovascular, renal and gastrointestinal system dysfunction.    High risk of deterioration and worsening vital organ dysfunction and death without the above critical care interventions.    Thank you for allowing me to participate in the care of this lady.  I will continue to follow her with great interest.    Critical Care Time: 35 minutes  05050  No time overlap  Time excludes procedures  Discussed with RN, RT, daughter, ER physician    Samuel Rodriguez MD  Pulmonary and Critical Care Medicine

## 2020-03-21 NOTE — ED TRIAGE NOTES
Pt here from home with sob, and unresponsive episode as per ems. OPA inserted and then dc. Hx colon/rectal ca on chemo.

## 2020-03-21 NOTE — ASSESSMENT & PLAN NOTE
This is Septic shock Present on admission  SIRS criteria identified on my evaluation include: Tachypnea, with respirations greater than 20 per minute  Source is intra-abdominal  Presentation includes: Severe sepsis present and persistent hypotension after 30 ml/kg completed.   Despite appropriate fluid resuscitation with crystalloid given per sepsis guidelines, the patient remains hypotensive with systolic blood pressure less than 90 or MAP less than 65  Hemodynamic support with additional fluids and IV vasopressors as needed to maintain a SBP of 90 or MAP of 65  IV antibiotics as appropriate for source of sepsis  Reassessment: I have reassessed the patient's hemodynamic status  Trend lactic acid  Start broad-spectrum source directed antibiotics

## 2020-03-21 NOTE — ASSESSMENT & PLAN NOTE
Culture urine and blood  Start empiric Zosyn   Physician Discharge Summary Patient ID: Barbara Sanderson [id number] 
7 y.o. 
1961 Admit date: 9/18/2018 Discharge date and time: 9/20/2018 Hospital Diagnoses and Treatment Rendered: Altered mental status; resolved  
-Unclear cause, DDx possible seizure vs intentional/unintentional drug overdose. -CTA head and neck report with no acute abnormalities. - Resume home meds and continue to monitor in hospital, overnight stable  
 
  
#. Seizure d/o:  
-Resume home medication/Keppra 
-no active seizure.  
  
#. Depression: Denies SI/HI, but patient very emotional 
-Resume home meds and psych consuled   
  
#. HLD: , recommend to have statin if no improvement after behavior modification Can follow with PCP  
 
09/20/18 1100 97.7 °F (36.5 °C) 99 115/69 84 -- At rest 20 -- Room air -- -- --   
  09/20/18 0800 -- -- -- -- -- -- -- -- Room air -- -- --  
  09/20/18 0659 97.5 °F (36.4 °C) 91 136/85 102 Constitutional:  No acute distress, cooperative, pleasant   
ENT:  Oral mucous moist, oropharynx benign.  laceration over the left brow Resp:  CTA bilaterally. No wheezing/rhonchi/rales. No accessory muscle use CV:  Regular rhythm, normal rate, no murmurs, gallops, rubs GI:  Soft, non distended, non tender. normoactive bowel sounds, no hepatosplenomegaly Musculoskeletal:  No edema, warm, 2+ pulses throughout Neurologic:  Moves all extremities. AAOx3, CN II-XII reviewed Psych: mood stable now SKIN:  Multiple bruising in both bilateral lower extremities that is superficial 
 
  
 
 
 
 
PROCEDURES/SURGERIES: * No surgery found * Chronic Diagnoses:   
Problem List as of 9/20/2018  Date Reviewed: 9/18/2018 Codes Class Noted - Resolved * (Principal)Altered mental status ICD-10-CM: R41.82 
ICD-9-CM: 780.97  9/18/2018 - Present S/P MICHELLE (total abdominal hysterectomy) ICD-10-CM: Z90.710 ICD-9-CM: V88.01  3/23/2018 - Present H/O gastric bypass ICD-10-CM: Z98.84 ICD-9-CM: V45.86  3/23/2018 - Present S/P cholecystectomy ICD-10-CM: Z90.49 ICD-9-CM: V45.79  3/23/2018 - Present Recurrent depression ([facility].) ICD-10-CM: F33.9 ICD-9-CM: 296.30  1/17/2018 - Present Scotoma involving central area in visual field ICD-10-CM: H53.419 ICD-9-CM: 368.41  12/30/2016 - Present Chronic headache ICD-10-CM: R51 ICD-9-CM: 784.0  12/30/2016 - Present Memory loss ICD-10-CM: R41.3 ICD-9-CM: 780.93  12/30/2016 - Present Myopia ICD-10-CM: H52.10 ICD-9-CM: 367.1  5/26/2016 - Present Posterior vitreous detachment ICD-10-CM: X79.327 ICD-9-CM: 379.21  5/26/2016 - Present Chronic nausea ICD-10-CM: R11.0 ICD-9-CM: 787.02  10/21/2014 - Present Vitamin D deficiency ICD-10-CM: E55.9 ICD-9-CM: 268.9  7/10/2014 - Present Seizure disorder ([facility].) ICD-10-CM: G40.909 ICD-9-CM: 345.90  10/3/2012 - Present Hypoglycemia, unspecified ICD-10-CM: E16.2 ICD-9-CM: 251.2  10/3/2012 - Present UTI (lower urinary tract infection) ICD-10-CM: N39.0 ICD-9-CM: 599.0  10/3/2012 - Present Anxiety disorder ICD-10-CM: F41.9 ICD-9-CM: 300.00  Unknown - Present Brain aneurysm ICD-10-CM: I67.1 ICD-9-CM: 437.3  2/12/2010 - Present Overview Signed 3/23/2018  2:57 PM by Bro Willett MD  
  Treated with surgical resection. 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  
Current Discharge Medication List  
  
CONTINUE these medications which have NOT CHANGED Details  
meloxicam (MOBIC) 15 mg tablet TAKE 1 TABLET BY MOUTH  DAILY Qty: 90 Tab, Refills: 3  
  
promethazine (PHENERGAN) 25 mg tablet Take 1 tablet by mouth  every 8 hours as needed for nausea Qty: 270 Tab, Refills: 3 IRON,CARBONYL (PERFECT IRON PO) Take 1,000 mg by mouth. loperamide (IMMODIUM) 2 mg tablet Take 2 mg by mouth four (4) times daily as needed for Diarrhea.  
  
acetaminophen (TYLENOL) 500 mg tablet Take  by mouth every six (6) hours as needed for Pain. QUEtiapine (SEROQUEL) 400 mg tablet Take 400 mg by mouth nightly. potassium 99 mg tablet Take 99 mg by mouth daily. cholecalciferol, vitamin D3, (VITAMIN D3) 2,000 unit tab Take 1 Tab by mouth daily. Qty: 30 Tab, Refills: 11  
 Associated Diagnoses: Vitamin D deficiency  
  
levETIRAcetam 1,000 mg tablet TAKE 1 TABLET BY MOUTH TWO  TIMES DAILY Qty: 180 Tab, Refills: 1  
  
buPROPion (WELLBUTRIN) 75 mg tablet Take  by mouth two (2) times a day. multivitamin (ONE A DAY) tablet Take 1 Tab by mouth daily. FLUoxetine (PROZAC) 40 mg capsule Take 80 mg by mouth daily. butalbital-acetaminophen-caffeine (FIORICET, ESGIC) -40 mg per tablet TAKE 1 TABLET BY MOUTH EVERY 4 HOURS AS NEEDED FOR PAIN Qty: 40 Tab, Refills: 0  
  
clonazepam (KLONOPIN) 1 mg tablet Take 1 mg by mouth two (2) times daily as needed (Anxiety). Associated Diagnoses: Anxiety disorder Follow up Care: 1. Viola Munguia MD in 1-2 weeks. Please call to set up an appointment shortly after discharge. Diet:  Cardiac Diet Disposition: 
Home. Advanced Directive:  
FULL x  
DNR Discharge Exam: See today's note. CONSULTATIONS: Psychiatry Significant Diagnostic Studies:  
9/18/2018: BUN 13 MG/DL (Ref range: 6 - 20 MG/DL); Calcium 8.8 MG/DL (Ref range: 8.5 - 10.1 MG/DL); CO2 24 mmol/L (Ref range: 21 - 32 mmol/L); Creatinine 0.63 MG/DL (Ref range: 0.55 - 1.02 MG/DL); Glucose 102 mg/dL* (Ref range: 65 - 100 mg/dL); HCT 42.7 % (Ref range: 35.0 - 47.0 %); HGB 13.7 g/dL (Ref range: 11.5 - 16.0 g/dL); Potassium 3.8 mmol/L (Ref range: 3.5 - 5.1 mmol/L); Sodium 138 mmol/L (Ref range: 136 - 145 mmol/L) 9/19/2018: BUN 10 MG/DL (Ref range: 6 - 20 MG/DL); Calcium 8.5 MG/DL (Ref range: 8.5 - 10.1 MG/DL); CO2 23 mmol/L (Ref range: 21 - 32 mmol/L); Creatinine 0.58 MG/DL (Ref range: 0.55 - 1.02 MG/DL); Glucose 125 mg/dL* (Ref range: 65 - 100 mg/dL); HCT 43.9 % (Ref range: 35.0 - 47.0 %); HGB 14.0 g/dL (Ref range: 11.5 - 16.0 g/dL); Potassium 3.4 mmol/L* (Ref range: 3.5 - 5.1 mmol/L); Sodium 138 mmol/L (Ref range: 136 - 145 mmol/L) Recent Labs  
   09/19/18 
 0357  09/18/18 
 1704 WBC  9.5  10.2 HGB  14.0  13.7 HCT  43.9  42.7 PLT  319  335 Recent Labs  
   09/19/18 
 0357  09/18/18 
 1704 NA  138  138  
K  3.4*  3.8 CL  104  103 CO2  23  24 BUN  10  13 CREA  0.58  0.63 GLU  125*  102* CA  8.5  8.8 MG  1.8  1.9 PHOS  2.5*   --   
 
Recent Labs  
   09/19/18 0357 09/18/18 1704 SGOT  32  32 AP  261*  276* TP  8.0  8.4* ALB  3.7  4.0  
GLOB  4.3*  4.4* Recent Labs  
   09/18/18 1704 INR  1.0 PTP  9.9 APTT  27.6 No results for input(s): FE, TIBC, PSAT, FERR in the last 72 hours. No results for input(s): PH, PCO2, PO2 in the last 72 hours. Recent Labs  
   09/19/18 0357 09/18/18 1704 CPK  356*  332*  330* CKMB   --   7.5* No components found for: [place] PATIENT CONDITION AT DISCHARGE:  
 
Functional status Poor Deconditioned   
x Independent Cognition  
xx  Lucid Forgetful Dementia Catheters/lines (plus indication) Zimmerman PICC   
 PEG   
x None CDMP Checked:  
Yes x Time spend > 30 mins Signed: Newton Parker MD 
9/20/2018 
1:06 PM

## 2020-03-21 NOTE — ANESTHESIA POSTPROCEDURE EVALUATION
Patient: Elle Barksdale    Procedure Summary     Date:  03/21/20 Room / Location:  [facility] 11 / SURGERY [facility]    Anesthesia Start:  1345 Anesthesia Stop:  1602    Procedure:  LAPAROTOMY, EXPLORATORY, lysis of adhesions, repair of perforation, stricteroplasty (N/A Abdomen) Diagnosis:  ( small bowel obstruction)    Surgeon:  Pamela Interiano M.D. Responsible Provider:  Chapincito Robertson M.D.    Anesthesia Type:  general ASA Status:  3 - Emergent          Final Anesthesia Type: general  Last vitals  BP   Blood Pressure : (!) 84/54    Temp   36.1 °C (96.9 °F)    Pulse   Pulse: (!) 103   Resp   20    SpO2   97 %      Anesthesia Post Evaluation    Patient location during evaluation: ICU  Patient participation: complete - patient participated  Level of consciousness: sleepy but conscious    Airway patency: patent  Anesthetic complications: no  Cardiovascular status: hemodynamically stable  Respiratory status: acceptable, ETT and intubated  Hydration status: euvolemic    PONV: none           Nurse Pain Score: 5 (NPRS)

## 2020-03-21 NOTE — ED NOTES
Lab called with critical result of lactic 10.9 at 0937 . Critical lab result read back.   Dr. Douglass notified of critical lab result at 0937.  Critical lab result read back by

## 2020-03-21 NOTE — ADDENDUM NOTE
Addendum  created 03/21/20 1625 by Chapincito Robertson M.D.    Clinical Note Signed, Review and Sign - Ready for Procedure

## 2020-03-21 NOTE — ANESTHESIA QCDR
2019 [facility] Clinical Data Registry (for Quality Improvement)     Postoperative nausea/vomiting risk protocol (Adult = 18 yrs and Pediatric 3-17 yrs)- (430 and 463)  General inhalation anesthetic (NOT TIVA) with PONV risk factors: No  Provision of anti-emetic therapy with at least 2 different classes of agents: N/A  Patient DID NOT receive anti-emetic therapy and reason is documented in Medical Record: N/A    Multimodal Pain Management- (477)  Non-emergent surgery AND patient age >= 18: No  Use of Multimodal Pain Management, two or more drugs and/or interventions, NOT including systemic opioids:   Exception: Documented allergy to multiple classes of analgesics:     Smoking Abstinence (404)  Patient is current smoker (cigarette, pipe, e-cig, marijuanna): No  Elective Surgery:   Abstinence instructions provided prior to day of surgery:   Patient abstained from smoking on day of surgery:     Pre-Op Beta-Blocker in Isolated CABG (44)  Isolated CABG AND patient age >= 18: No  Beta-blocker admin within 24 hours of surgical incision:   Exception:of medical reason(s) for not administering beta blocker within 24 hours prior to surgical incision (e.g., not  indicated,other medical reason):     PACU assessment of acute postoperative pain prior to Anesthesia Care End- Applies to Patients Age = 18- (ABG7)  Initial PACU pain score is which of the following: Pain not assessed  Patient unable to report pain score: Yes (Patient Unable to Report)    Post-anesthetic transfer of care checklist/protocol to PACU/ICU- (426 and 427)  Upon conclusion of case, patient transferred to which of the following locations: ICU  Use of transfer checklist/protocol: Yes  Exclusion: Service Performed in Patient Hospital Room (and thus did not require transfer): N/A  Unplanned admission to ICU related to anesthesia service up through end of PACU care- (MD51)  Unplanned admission to ICU (not initially anticipated at anesthesia start time): Yes

## 2020-03-21 NOTE — PROGRESS NOTES
Patient arrived to [facility] at 1555 via OR staff. Patient attached to ICU monitors. Bedside report received from anesthesiology. Weight 72.8kg. Temperature 94.8F. All personal belongings sent home with daughter.      2 RN skin assessment completed with Roxanne    - Overall skin discoloration scattered throughout integument.  - Multiple open wounds to sacral region. Per daughter, wounds are from previous radiation therapy. Pictures taken, wound consult placed. Petroleum gauze and mepilex placed over wounds   - Bilateral blanching bunions  - Skin tears to bilateral groin

## 2020-03-21 NOTE — ASSESSMENT & PLAN NOTE
Due to sepsis and intravascular volume depletion  Aggressive fluid resuscitation  Monitor urine output and renal function  Renal dose medications and avoid nephrotoxins

## 2020-03-22 PROBLEM — J95.821 RESPIRATORY FAILURE FOLLOWING TRAUMA AND SURGERY (HCC): Status: ACTIVE | Noted: 2020-01-01

## 2020-03-22 PROBLEM — K63.1 PERFORATION OF INTESTINE (HCC): Status: ACTIVE | Noted: 2020-01-01

## 2020-03-22 NOTE — ASSESSMENT & PLAN NOTE
Bowel obstruction with perforation  3/21 ex lap, JENNIFER, primary repair of jejunal perforation and stricturoplasty  3/28 Stooling, tolerating TF so far  - adv feeds  Dr. Interiano, general surgery

## 2020-03-22 NOTE — ASSESSMENT & PLAN NOTE
Receiving xrt and chemotherapy (Xeloda) up until hospital admission for SBO  Dr. Chappell, radiation oncology.  Dr. Matthew, medical oncology.

## 2020-03-22 NOTE — CARE PLAN
Problem: Venous Thromboembolism (VTW)/Deep Vein Thrombosis (DVT) Prevention:  Goal: Patient will participate in Venous Thrombosis (VTE)/Deep Vein Thrombosis (DVT)Prevention Measures  Outcome: PROGRESSING AS EXPECTED  Intervention: Ensure patient wears graduated elastic stockings (LYNDSEY hose) and/or SCDs, if ordered, when in bed or chair (Remove at least once per shift for skin check)  Note: SCDs in use for mechanical prophylaxis. Pharmacologic prophylaxis contraindicated per MD.      Problem: Bowel/Gastric:  Goal: Normal bowel function is maintained or improved  Outcome: PROGRESSING AS EXPECTED  Intervention: Educate patient and significant other/support system about diet, fluid intake, medications and activity to promote bowel function  Note: Patient with NG tube to low continuous suction post bowel surgery and strict NPO at this time. Will continue to monitor.

## 2020-03-22 NOTE — CARE PLAN
Problem: Safety  Goal: Will remain free from injury  Note: Room near nursing station, bedalarm activated and restraints applied appropriately to ensure pt safety.     Problem: Knowledge Deficit  Goal: Knowledge of disease process/condition, treatment plan, diagnostic tests, and medications will improve  Note: Discussed plan of care with the patient and her daughter.

## 2020-03-22 NOTE — CARE PLAN
Adult Ventilation Update    Total Vent Days: 2  /+8/40%    Patient Lines/Drains/Airways Status      Active Airway       Name: Placement date: Placement time: Site: Days:    Airway ETT 7.5  03/21/20   1555   --  less than 1    ETT  03/21/20   1351   Oral  less than 1                    In the last 24 hours, the patient tolerated SBT for 0 hours.       Static Compliance (ml / cm H2O): 40 (03/21/20 1555)    (ASV) % MIN VOL: 100 (03/21/20 1555)  ASV Inspiratory Pressures  % Spontaneous 0    Events/Summary/Plan: Patient continues on ventilation throughout the night. Patient tolerates this well. Will continue to monitor and assist as needed.

## 2020-03-22 NOTE — PROGRESS NOTES
2 RN skin check completed with KODAK Robins:    -Scattered skin discoloration/redness to generalized integumentary  -Redness/blanching to bunions on bilateral feet  -Redness/skin tears to bilateral groin  -Open wounds, discoloration, necrotic appearing tissue to sacral region - per family, wounds are from radiation therapy - petroleum gauze and mepilex in place

## 2020-03-22 NOTE — ASSESSMENT & PLAN NOTE
-Resolved    S/p IV Zosyn for 7 days for E-coli bacteremia  Urine cx growing E-coli   Culture from bowel surgery NGTD    Now on comfort measure , [facility]

## 2020-03-22 NOTE — H&P
Hospital Medicine History & Physical Note    Date of Service  3/21/2020    Primary Care Physician  Pcp Pt States None    Consultants  Critical care  General surgery    Code Status  Full code    Chief Complaint  Abdominal pain.    History of Presenting Illness  80 y.o. 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  Patient stated that she started having vague abdominal pain and started 3 weeks ago which worsened over the course of time and came unbearable over the last 2 days.  This was associated with shortness of breath and generalized weakness.  Also was feeling nauseous and started vomiting fecal material.  She became concerned and presented to the emergency room where she was found hypotensive and tachypneic.  Blood work showed leukopenia and bandemia.  Acute kidney injury. Her lactic acid was 10.9.  UA was positive for UTI.   CT of the abdomen and pelvis showed that a small intestines with decompression distal and decompression of the colon consistent with small bowel obstruction with pneumatosis involving the dilated loops concerning for bowel necrosis.  Central line was placed.  Patient was started on Levophed.  Intensive care and general surgery consulted.  Was started on broad-spectrum IV antibiotics.  She denies any recent travel.  Denies any chest pain.  Was admitted to the intensive care unit for close monitoring.    Review of Systems  Review of Systems   Constitutional: Positive for malaise/fatigue. Negative for chills and fever.   HENT: Negative for ear pain, hearing loss and tinnitus.    Eyes: Negative for blurred vision, double vision and photophobia.   Respiratory: Positive for shortness of breath. Negative for cough and hemoptysis.    Cardiovascular: Negative for chest pain and palpitations.   Gastrointestinal: Positive for abdominal pain, nausea and vomiting. Negative  for blood in stool and melena.   Genitourinary: Positive for dysuria and urgency. Negative for hematuria.   Musculoskeletal: Negative for back pain, myalgias and neck pain.   Skin: Negative for rash.   Neurological: Positive for weakness. Negative for dizziness, tingling and headaches.   Psychiatric/Behavioral: Negative for depression, substance abuse and suicidal ideas.       Past Medical History   has a past medical history of Acute pain of left knee (10/26/2017), Leg swelling (10/26/2017), Osteoarthritis (6/11/2018), Rectal cancer (HCC), and Right knee injury (4/4/2013).    Surgical History   has a past surgical history that includes appendectomy; tonsillectomy; cataract phaco with iol (12/16/2013); cataract phaco with iol (1/13/2014); and colonoscopy with biopsy.     Family History  family history includes Cancer in her father; Hypertension in her mother; No Known Problems in her brother, daughter, daughter, and daughter.     Social History   reports that she quit smoking about 30 years ago. Her smoking use included cigarettes. She has a 30.00 pack-year smoking history. She has never used smokeless tobacco. She reports current alcohol use. She reports that she does not use drugs.    Allergies  No Known Allergies    Medications  Prior to Admission Medications   Prescriptions Last Dose Informant Patient Reported? Taking?   acetaminophen (TYLENOL) 325 MG Tab PRN Family Member Yes Yes   Sig: Take 650 mg by mouth every four hours as needed.   ondansetron (ZOFRAN ODT) 8 MG TABLET DISPERSIBLE PRN Family Member Yes No   Sig: DISSOLVE 1 TABLET BY MOUTH EVERY 12 HOURS AS NEEDED FOR NAUSEA VOMITING   prochlorperazine (COMPAZINE) 10 MG Tab PRN Family Member Yes No   Sig: Take 10 mg by mouth every 6 hours as needed.      Facility-Administered Medications: None       Physical Exam  Temp:  [36.1 °C (96.9 °F)-36.7 °C (98.1 °F)] 36.1 °C (96.9 °F)  Pulse:  [] 103  Resp:  [20-43] 20  BP: ()/(45-63) 84/54  SpO2:  [91  %-100 %] 97 %    Physical Exam  Constitutional:       Appearance: She is ill-appearing.   HENT:      Head: Normocephalic and atraumatic.      Nose: No congestion or rhinorrhea.      Mouth/Throat:      Mouth: Mucous membranes are dry.   Eyes:      Extraocular Movements: Extraocular movements intact.      Pupils: Pupils are equal, round, and reactive to light.   Neck:      Musculoskeletal: No muscular tenderness.   Cardiovascular:      Rate and Rhythm: Normal rate and regular rhythm.      Heart sounds: No friction rub. No gallop.    Pulmonary:      Effort: Pulmonary effort is normal.      Breath sounds: Decreased breath sounds and rhonchi present.   Abdominal:      General: There is distension.      Tenderness: There is abdominal tenderness. There is guarding. There is no right CVA tenderness.   Musculoskeletal:         General: No swelling or tenderness.   Lymphadenopathy:      Cervical: No cervical adenopathy.   Skin:     Coloration: Skin is not jaundiced.   Neurological:      Mental Status: She is oriented to person, place, and time. She is lethargic.   Psychiatric:         Mood and Affect: Mood is anxious.         Laboratory:  Recent Labs     03/21/20  0919 03/21/20  1610   WBC 9.7 4.1*   RBC 3.89* 2.69*   HEMOGLOBIN 12.1 8.5*   HEMATOCRIT 37.5 25.8*   MCV 96.4 93.7   MCH 31.1 31.6   MCHC 32.3* 33.7   RDW 60.1* 57.5*   PLATELETCT 209 132*   MPV 11.3 11.2     Recent Labs     03/19/20  1430 03/20/20  1232 03/21/20  0919   SODIUM 136 137 141   POTASSIUM 3.1* 3.8 3.0*   CHLORIDE 92* 99 95*   CO2 24 25 18*   GLUCOSE 140* 107* 163*   BUN 68* 60* 72*   CREATININE 1.71* 1.19 1.98*   CALCIUM 8.3* 7.7* 8.0*     Recent Labs     03/19/20  1430 03/20/20  1232 03/21/20  0919   ALTSGPT 26  --  25   ASTSGOT 27  --  30   ALKPHOSPHAT 116*  --  102*   TBILIRUBIN 1.1  --  0.7   LIPASE  --   --  10*   GLUCOSE 140* 107* 163*     Recent Labs     03/21/20  0919   APTT 26.1     No results for input(s): NTPROBNP in the last 72  hours.  Recent Labs     03/21/20  1610   TRIGLYCERIDE 85     Recent Labs     03/21/20  0919   TROPONINT 24*       Urinalysis:    Recent Labs     03/21/20  1034   SPECGRAVITY 1.020   GLUCOSEUR Negative   KETONES Trace*   NITRITE Negative   LEUKESTERAS Moderate*   WBCURINE Packed*   RBCURINE 5-10*   BACTERIA Many*   EPITHELCELL Few        Imaging:  CT-ABDOMEN-PELVIS W/O   Final Result      1.  Dilated small intestine with decompression distally and decompression of the colon consistent with small bowel obstruction. There is pneumatosis involving dilated loops of small intestine consistent with bowel necrosis/ischemia.      2.  Small amount of ascites.      3.  Right lung base atelectasis/consolidation and small right pleural effusion.      This was discussed with ALIYAH TOSCANO at below the 6:00 AM on 3/21/2020.      DX-CHEST-PORTABLE (1 VIEW)   Final Result      1.  Interval placement of a right IJ central line without evidence of complication.      2.  Cardiomegaly.      DX-CHEST-PORTABLE (1 VIEW)   Final Result      Borderline cardiomegaly.            Assessment/Plan:  I anticipate this patient will require at least two midnights for appropriate medical management, necessitating inpatient admission.    * Septic shock (HCC)  Assessment & Plan  This is Septic shock Present on admission  SIRS criteria identified on my evaluation include: Tachycardia, with heart rate greater than 90 BPM, Tachypnea, with respirations greater than 20 per minute and Leukopenia, with WBC less than 4,000  Source is Intra abdominal   Presentation includes: Severe sepsis present and initial Lactate level result is >= 4 mmol/L.   Despite appropriate fluid resuscitation with crystalloid given per sepsis guidelines, the patient remains hypotensive with systolic blood pressure less than 90 or MAP less than 65  Hemodynamic support with additional fluids and IV vasopressors as needed to maintain a SBP of 90 or MAP of 65  IV antibiotics as  appropriate for source of sepsis  Reassessment: I have reassessed the patient's hemodynamic status      Pneumatosis intestinalis of small intestine  Assessment & Plan  CT scan of abdominal pelvis suggested bowel obstruction with pneumatosis involving the small bowels concerning for ischemia or necrosis.    Surgery consulted.  Continue with broad-spectrum IV antibiotics    Rectal cancer (HCC)- (present on admission)  Assessment & Plan  Stage II a adenocarcinoma of the rectum.  Status post chemo and radiation therapy.    Acute cystitis without hematuria  Assessment & Plan  Continue with IV antibiotics.  Follow-up with urine culture    Acute kidney injury (HCC)  Assessment & Plan  Likely secondary to sepsis and septic shock.  Fluid and hemodynamics resuscitation  Avoid nephrotoxins  Renal dose all medication    Hypokalemia- (present on admission)  Assessment & Plan  Replaced.  Continue to monitor.      VTE prophylaxis: SCD's

## 2020-03-22 NOTE — PROGRESS NOTES
Progress Note    Author:  Pamela Interiano MD    Date & Time:   3/22/2020   1:28 PM          Patient ID:             Name:             Elle Barksdale   YOB: 1940  Age:                 80 y.o.  female   MRN:               [id number]    ________________________________________________________________________      Interval Events:         Vent being weaned  Patient alert, following commands    +vasopressin/Levo 15mcg       Exam:       Vitals:    03/22/20 1215   BP: (!) 96/55   Pulse: 74   Resp: 12   Temp:    SpO2: 93%     SpO2  Min: 91 %  Max: 100 %  O2 (LPM)  Min: 2  Max: 3  FiO2%  Min: 30 %  Max: 50 %  Arterial MAP   Min: 53 mmHg  Max: 89 mmHg  Temp  Min: 34.9 °C (94.8 °F)  Max: 36.7 °C (98.1 °F)    Intake/Output Summary (Last 24 hours) at 3/22/2020 1328  Last data filed at 3/22/2020 1200  Gross per 24 hour   Intake 8165.92 ml   Output 3980 ml   Net 4185.92 ml       DIET ORDERS (From admission to next 24h)     Start     Ordered    03/21/20 1259  Diet NPO  ALL MEALS     Question:  Restrict to:  Answer:  Strict    03/21/20 1300                    Physical Exam  Nursing note reviewed.       Eyes:      Pupils: Pupils are equal, round, and reactive to light.      No scleral Icterus present  Cardiovascular:      Rate and Rhythm: Normal rate and regular rhythm. Extremities warm, 2+ BLE edema    Abdominal:      General: Abdomen is soft, mildly distended. Dressing dry            Recent Labs     03/19/20  1430  03/21/20  0919 03/21/20  1047 03/21/20  2204 03/22/20  0500   SODIUM 136   < > 141  --  146* 144   POTASSIUM 3.1*   < > 3.0*  --  3.5* 3.3*   CHLORIDE 92*   < > 95*  --  106 105   CO2 24   < > 18*  --  21 23   BUN 68*   < > 72*  --  66* 63*   CREATININE 1.71*   < > 1.98*  --  1.27 1.29   MAGNESIUM 2.3  --   --  2.1  --  2.1   PHOSPHORUS  --   --   --   --   --  4.3   CALCIUM 8.3*   < > 8.0*  --  6.5* 6.7*    < > = values in this interval not displayed.       Recent Labs     03/19/20  1430  03/21/20  0919  03/21/20  2204 03/22/20  0500   ALTSGPT 26  --  25  --  16   ASTSGOT 27  --  30  --  20   ALKPHOSPHAT 116*  --  102*  --  83   TBILIRUBIN 1.1  --  0.7  --  0.7   LIPASE  --   --  10*  --   --    GLUCOSE 140*   < > 163* 112* 122*    < > = values in this interval not displayed.       Recent Labs     03/21/20  0919 03/21/20 1610 03/22/20  0500   RBC 3.89* 2.69* 2.92*   HEMOGLOBIN 12.1 8.5* 9.0*   HEMATOCRIT 37.5 25.8* 28.0*   PLATELETCT 209 132* 162*   APTT 26.1  --   --        Recent Labs     03/19/20  1430 03/21/20  0919 03/21/20  1610 03/22/20  0500   WBC  --  9.7 4.1* 9.4   NEUTSPOLYS  --  76.50* 76.30* 78.60*   LYMPHOCYTES  --  0.90* 3.50* 0.00*   MONOCYTES  --  3.50 0.00 0.90   EOSINOPHILS  --  0.00 0.00 0.00   BASOPHILS  --  0.00 0.00 7.70*   ASTSGOT 27 30  --  20   ALTSGPT 26 25  --  16   ALKPHOSPHAT 116* 102*  --  83   TBILIRUBIN 1.1 0.7  --  0.7         ________________________________________________________________________      Patient Active Problem List   Diagnosis   • Osteoarthritis   • Blood per rectum   • Rectal cancer (HCC)   • Septic shock (HCC)   • Perforation of intestine (HCC)   • Acute kidney injury (HCC)   • Acute cystitis without hematuria   • Hypokalemia   • Respiratory failure following trauma and surgery (HCC)       Plan:  Possible extubation today  Continue NGT to suction

## 2020-03-22 NOTE — ASSESSMENT & PLAN NOTE
Stage II a adenocarcinoma of the rectum s.p chemo and radiation      Now on comfort measure ,  Likely going to group home on [facility], working with case management on this issue  -Continue multimodal pain therapy with IV morphine oral Roxanol and IV Ativan, remains with somewhat minimal use the last 24 hours and therefore no adjustment to many medications today  -Working on group home disposition`

## 2020-03-22 NOTE — ASSESSMENT & PLAN NOTE
- CT scan of abdominal pelvis suggested bowel obstruction with pneumatosis involving the small bowels concerning for ischemia or necrosis.    - S/p ex lap with small bowel resection 3/21/20 with Dr. Alfred   ----> Noted to have large output from rectal tube , thus c-dif study was obtained which has been -ve .   -----> still noted to have lots of output from rectal tube   ----> Dr alfred following and will continue  imodium  4 mg po qid     Now on comfort measure ,  Likely going to group home on [facility] hosp[facility]

## 2020-03-22 NOTE — PROGRESS NOTES
This lady has gone to the OR and underwent exploratory laparotomy, lysis of adhesions and primary repair of a jejunal perforation with stricturoplasty.  She has now been returned to the ICU.  Dr. Landa will assume her care on the surgical critical care service.    [facility] will sign off.  Please call if you have any questions.    Samuel Rodriguez MD  Pulmonary and Critical Care Medicine

## 2020-03-22 NOTE — PROGRESS NOTES
2RN skin check with KODAK Delarosa    Small scabbed wound on lips, from radiation treatment per the patient's daughter.  Midline abdominal surgical incision: dressing with small areas of old drainage, no new drainage noted  Skin indentation noted from wheatley securing device: device moved and will reassess.  Open sores and tissue damages noted on coccyx/buttocks area: from radiation treatment per the patient's daughter.  Mepilex in place.  Pink/blanching bilateral heels: floated using pillows,

## 2020-03-22 NOTE — CARE PLAN
Problem: Safety  Goal: Will remain free from injury  Outcome: PROGRESSING AS EXPECTED  Note: Bed in low and locked position, patient oriented to surroundings, bed alarm in use, patient in room near nurses station     Problem: Pain Management  Goal: Pain level will decrease to patient's comfort goal  Outcome: PROGRESSING AS EXPECTED  Note: Pain assessed regularly, repositioned for pain alleviation, medicated per MAR

## 2020-03-22 NOTE — PROGRESS NOTES
Trauma / Surgical Daily Progress Note    Date of Service  3/22/2020    Chief Complaint  80 y.o. female admitted 3/21/2020 with Septic shock (HCC) secondary to bowel obstruction with perforated intestine.    Interval Events  Hospital day #2  Critically ill  Requires continued ICU and hospital admission  Critical care interventions include:  integration of multiple data points and associated complex medical decisions  Management of ventilator-weaning with goal of extubation today  Management of septic shock- remains on pressors, weaning as able with ongoing resuscitation.  Lactic acid levels have normalized   Ongoing infusion of antibiotics for infection/contamination  Pain control    Review of Systems  Review of Systems   Unable to perform ROS: Intubated        Vital Signs for last 24 hours  Temp:  [34.9 °C (94.8 °F)-36.1 °C (96.9 °F)] 34.9 °C (94.8 °F)  Pulse:  [] 84  Resp:  [11-38] 30  BP: ()/(51-75) 116/58  SpO2:  [93 %-100 %] 98 %    Hemodynamic parameters for last 24 hours       Respiratory Data     Respiration: (!) 30, Pulse Oximetry: 98 %        RUL Breath Sounds: Clear, RML Breath Sounds: Clear, RLL Breath Sounds: Diminished, DAVON Breath Sounds: Clear, LLL Breath Sounds: Diminished    Physical Exam  Physical Exam  Constitutional:       Appearance: She is ill-appearing.      Comments: elderly   HENT:      Head: Normocephalic and atraumatic.      Comments: ET tube in place  NG in place     Right Ear: External ear normal.      Left Ear: External ear normal.      Nose: Nose normal.   Eyes:      General: No scleral icterus.     Extraocular Movements: Extraocular movements intact.      Pupils: Pupils are equal, round, and reactive to light.   Neck:      Musculoskeletal: Normal range of motion.   Cardiovascular:      Rate and Rhythm: Normal rate and regular rhythm.      Pulses: Normal pulses.      Heart sounds: Normal heart sounds.   Pulmonary:      Breath sounds: No wheezing or rales.      Comments: On  vent  Abdominal:      General: There is distension.      Tenderness: There is abdominal tenderness.      Comments: Dressing in place   Genitourinary:     Comments: De La Garza in place  Musculoskeletal: Normal range of motion.         General: No swelling or deformity.   Skin:     General: Skin is warm and dry.      Capillary Refill: Capillary refill takes less than 2 seconds.      Coloration: Skin is not jaundiced.   Neurological:      General: No focal deficit present.      Mental Status: She is alert and oriented to person, place, and time.      Cranial Nerves: No cranial nerve deficit.   Psychiatric:      Comments: Unable to assess         Laboratory  Recent Results (from the past 24 hour(s))   GRAM STAIN    Collection Time: 03/21/20  2:16 PM   Result Value Ref Range    Significant Indicator .     Source WND     Site Peritoneal Fluid     Gram Stain Result Few WBCs.  No organisms seen.      ISTAT ARTERIAL BLOOD GAS    Collection Time: 03/21/20  3:01 PM   Result Value Ref Range    Ph 7.410 7.400 - 7.500    Pco2 37.9 (H) 26.0 - 37.0 mmHg    Po2 142 (H) 64 - 87 mmHg    Tco2 25 20 - 33 mmol/L    S02 99 93 - 99 %    Hco3 24.1 17.0 - 25.0 mmol/L    BE 0 -4 - 3 mmol/L    Body Temp 37.0 C degrees    O2 Therapy 58 %    iPF Ratio 245     Ph Temp Abdi 7.410 7.400 - 7.500    Pco2 Temp Co 37.9 (H) 26.0 - 37.0 mmHg    Po2 Temp Cor 142 (H) 64 - 87 mmHg    Specimen Arterial     Action Range Triggered YES     Inst. Qualified Patient YES    ISTAT GLUCOSE    Collection Time: 03/21/20  3:01 PM   Result Value Ref Range    Istat Glucose 103 (H) 65 - 99 mg/dL   ISTAT SODIUM    Collection Time: 03/21/20  3:01 PM   Result Value Ref Range    Istat Sodium 141 135 - 145 mmol/L   ISTAT POTASSIUM    Collection Time: 03/21/20  3:01 PM   Result Value Ref Range    Istat Potassium 2.5 (LL) 3.6 - 5.5 mmol/L   ISTAT IONIZED CA    Collection Time: 03/21/20  3:01 PM   Result Value Ref Range    Istat Ionized Calcium 0.91 (L) 1.10 - 1.30 mmol/L   ISTAT  HEMATOCRIT AND HEMOGLOBIN    Collection Time: 03/21/20  3:01 PM   Result Value Ref Range    Istat Hematocrit 21 (L) 37 - 47 %    Istat Hemoglobin 7.1 (L) 12.0 - 16.0 g/dL   ABO Rh Confirm    Collection Time: 03/21/20  3:12 PM   Result Value Ref Range    ABO Rh Confirm A POS    Lactic Acid -STAT Once    Collection Time: 03/21/20  4:10 PM   Result Value Ref Range    Lactic Acid 1.7 0.5 - 2.0 mmol/L   CBC WITH DIFFERENTIAL    Collection Time: 03/21/20  4:10 PM   Result Value Ref Range    WBC 4.1 (L) 4.8 - 10.8 K/uL    RBC 2.69 (L) 4.20 - 5.40 M/uL    Hemoglobin 8.5 (L) 12.0 - 16.0 g/dL    Hematocrit 25.8 (L) 37.0 - 47.0 %    MCV 93.7 81.4 - 97.8 fL    MCH 31.6 27.0 - 33.0 pg    MCHC 33.7 33.6 - 35.0 g/dL    RDW 57.5 (H) 35.9 - 50.0 fL    Platelet Count 132 (L) 164 - 446 K/uL    MPV 11.2 9.0 - 12.9 fL    Neutrophils-Polys 76.30 (H) 44.00 - 72.00 %    Lymphocytes 3.50 (L) 22.00 - 41.00 %    Monocytes 0.00 0.00 - 13.40 %    Eosinophils 0.00 0.00 - 6.90 %    Basophils 0.00 0.00 - 1.80 %    Nucleated RBC 0.00 /100 WBC    Neutrophils (Absolute) 3.35 2.00 - 7.15 K/uL    Lymphs (Absolute) 0.14 (L) 1.00 - 4.80 K/uL    Monos (Absolute) 0.00 0.00 - 0.85 K/uL    Eos (Absolute) 0.00 0.00 - 0.51 K/uL    Baso (Absolute) 0.00 0.00 - 0.12 K/uL    NRBC (Absolute) 0.00 K/uL    Anisocytosis 1+     Microcytosis 1+    Triglycerides Starting now and then Every 3 Days    Collection Time: 03/21/20  4:10 PM   Result Value Ref Range    Triglycerides 85 0 - 149 mg/dL   DIFFERENTIAL MANUAL    Collection Time: 03/21/20  4:10 PM   Result Value Ref Range    Bands-Stabs 5.30 0.00 - 10.00 %    Metamyelocytes 6.10 %    Myelocytes 8.80 %    Manual Diff Status PERFORMED    PERIPHERAL SMEAR REVIEW    Collection Time: 03/21/20  4:10 PM   Result Value Ref Range    Peripheral Smear Review see below    PLATELET ESTIMATE    Collection Time: 03/21/20  4:10 PM   Result Value Ref Range    Plt Estimation Decreased    MORPHOLOGY    Collection Time: 03/21/20  4:10 PM    Result Value Ref Range    RBC Morphology Present     Poikilocytosis 2+     Ovalocytes 2+     Toxic Gran Marked    Lactic Acid Every four hours after STAT order    Collection Time: 20  8:50 PM   Result Value Ref Range    Lactic Acid 1.5 0.5 - 2.0 mmol/L   EKG    Collection Time: 20  9:35 PM   Result Value Ref Range    Report       [facility]    Test Date:  2020  Pt Name:    ERNESTO CORDERO                Department: 19  MRN:        [id number]                      Room:       [facility]  Gender:     Female                       Technician: [id number]  :        [id number]                   Requested By:RAVINDER DAY  Order #:    [id number]                    Reading MD: Stiven Cote MD    Measurements  Intervals                                Axis  Rate:       76                           P:          23  WY:         164                          QRS:        15  QRSD:       84                           T:          29  QT:         368  QTc:        414    Interpretive Statements  WANDERING PACEMAKER  BORDERLINE LOW VOLTAGE IN FRONTAL LEADS  NONSPECIFIC T ABNORMALITIES, LATERAL LEADS  Compared to ECG 2020 09:00:36  No significant changes  Electronically Signed On 3- 23:31:22 PDT by Stiven Cote MD     Lactic Acid Every four hours after STAT order    Collection Time: 20 10:04 PM   Result Value Ref Range    Lactic Acid 1.3 0.5 - 2.0 mmol/L   Basic Metabolic Panel    Collection Time: 20 10:04 PM   Result Value Ref Range    Sodium 146 (H) 135 - 145 mmol/L    Potassium 3.5 (L) 3.6 - 5.5 mmol/L    Chloride 106 96 - 112 mmol/L    Co2 21 20 - 33 mmol/L    Glucose 112 (H) 65 - 99 mg/dL    Bun 66 (H) 8 - 22 mg/dL    Creatinine 1.27 0.50 - 1.40 mg/dL    Calcium 6.5 (LL) 8.5 - 10.5 mg/dL    Anion Gap 19.0 (H) 7.0 - 16.0   ESTIMATED GFR    Collection Time: 20 10:04 PM   Result Value Ref Range    GFR If  49 (A) >60 mL/min/1.73 m 2    GFR If Non  40 (A) >60  mL/min/1.73 m 2   CBC WITH DIFFERENTIAL    Collection Time: 03/22/20  5:00 AM   Result Value Ref Range    WBC 9.4 4.8 - 10.8 K/uL    RBC 2.92 (L) 4.20 - 5.40 M/uL    Hemoglobin 9.0 (L) 12.0 - 16.0 g/dL    Hematocrit 28.0 (L) 37.0 - 47.0 %    MCV 95.9 81.4 - 97.8 fL    MCH 30.8 27.0 - 33.0 pg    MCHC 32.1 (L) 33.6 - 35.0 g/dL    RDW 59.4 (H) 35.9 - 50.0 fL    Platelet Count 162 (L) 164 - 446 K/uL    MPV 11.5 9.0 - 12.9 fL    Neutrophils-Polys 78.60 (H) 44.00 - 72.00 %    Lymphocytes 0.00 (L) 22.00 - 41.00 %    Monocytes 0.90 0.00 - 13.40 %    Eosinophils 0.00 0.00 - 6.90 %    Basophils 7.70 (H) 0.00 - 1.80 %    Nucleated RBC 0.20 /100 WBC    Neutrophils (Absolute) 8.27 (H) 2.00 - 7.15 K/uL    Lymphs (Absolute) 0.00 (L) 1.00 - 4.80 K/uL    Monos (Absolute) 0.08 0.00 - 0.85 K/uL    Eos (Absolute) 0.00 0.00 - 0.51 K/uL    Baso (Absolute) 0.72 (H) 0.00 - 0.12 K/uL    NRBC (Absolute) 0.02 K/uL    Anisocytosis 1+     Microcytosis 1+    Comp Metabolic Panel    Collection Time: 03/22/20  5:00 AM   Result Value Ref Range    Sodium 144 135 - 145 mmol/L    Potassium 3.3 (L) 3.6 - 5.5 mmol/L    Chloride 105 96 - 112 mmol/L    Co2 23 20 - 33 mmol/L    Anion Gap 16.0 7.0 - 16.0    Glucose 122 (H) 65 - 99 mg/dL    Bun 63 (H) 8 - 22 mg/dL    Creatinine 1.29 0.50 - 1.40 mg/dL    Calcium 6.7 (LL) 8.5 - 10.5 mg/dL    AST(SGOT) 20 12 - 45 U/L    ALT(SGPT) 16 2 - 50 U/L    Alkaline Phosphatase 83 30 - 99 U/L    Total Bilirubin 0.7 0.1 - 1.5 mg/dL    Albumin 1.6 (L) 3.2 - 4.9 g/dL    Total Protein 3.7 (L) 6.0 - 8.2 g/dL    Globulin 2.1 1.9 - 3.5 g/dL    A-G Ratio 0.8 g/dL   MAGNESIUM    Collection Time: 03/22/20  5:00 AM   Result Value Ref Range    Magnesium 2.1 1.5 - 2.5 mg/dL   PHOSPHORUS    Collection Time: 03/22/20  5:00 AM   Result Value Ref Range    Phosphorus 4.3 2.5 - 4.5 mg/dL   ESTIMATED GFR    Collection Time: 03/22/20  5:00 AM   Result Value Ref Range    GFR If  48 (A) >60 mL/min/1.73 m 2    GFR If Non African  American 40 (A) >60 mL/min/1.73 m 2   DIFFERENTIAL MANUAL    Collection Time: 03/22/20  5:00 AM   Result Value Ref Range    Bands-Stabs 9.40 0.00 - 10.00 %    Metamyelocytes 3.40 %    Manual Diff Status PERFORMED    PERIPHERAL SMEAR REVIEW    Collection Time: 03/22/20  5:00 AM   Result Value Ref Range    Peripheral Smear Review see below    PLATELET ESTIMATE    Collection Time: 03/22/20  5:00 AM   Result Value Ref Range    Plt Estimation Normal    MORPHOLOGY    Collection Time: 03/22/20  5:00 AM   Result Value Ref Range    RBC Morphology Present     Poikilocytosis 2+     Ovalocytes 2+     Toxic Gran Marked        Fluids    Intake/Output Summary (Last 24 hours) at 3/22/2020 1105  Last data filed at 3/22/2020 1000  Gross per 24 hour   Intake 7774.64 ml   Output 4855 ml   Net 2919.64 ml       Core Measures & Quality Metrics  Labs reviewed and Medications reviewed  De La Garza catheter: Critically Ill - Requiring Accurate Measurement of Urinary Output      DVT Prophylaxis: Enoxaparin (Lovenox)  DVT prophylaxis - mechanical: SCDs  Ulcer prophylaxis: Yes  Antibiotics: Treating active infection/contamination beyond 24 hours perioperative coverage      NORBERT Score    ETOH Screening      Assessment/Plan  * Septic shock (HCC)  Assessment & Plan  Ongoing resuscitation with crystalloid and blood products as needed  On levophed and vasopressin ggt-weaning drips as able  Lactates have normalized  3/22 Zosyn day #2 of 7      Respiratory failure following trauma and surgery (HCC)  Assessment & Plan  Remained on the ventilator post operation  3/22 weaning with goal of extubation    Perforation of intestine (HCC)  Assessment & Plan  Bowel obstruction with perforation  Repaired/released in OR  Dr. Interiano    Rectal cancer (HCC)- (present on admission)  Assessment & Plan  Currently receiving xrt and chemotherapy    Acute kidney injury (HCC)  Assessment & Plan  Initial creatinine 1.98  Improving with resuscitation  Follow  parameters      Discussed patient condition with RN, RT and general surgery.  CRITICAL CARE TIME EXCLUDING PROCEDURES: 33    minutes

## 2020-03-22 NOTE — ASSESSMENT & PLAN NOTE
Remained on the ventilator post operation  3/22 extubated  3/23 weak cough  3/28 Improving pulm status

## 2020-03-22 NOTE — PROGRESS NOTES
Trauma / Surgical Daily Progress Note    Date of Service  3/21/2020    Chief Complaint  80 y.o. female admitted 3/21/2020 with Septic shock (HCC) secondary to bowel obstruction with perforated intestine.    Interval Events  Hospital day #1  Critically ill  Requires continued ICU and hospital admission  Critical care interventions include:  integration of multiple data points and associated complex medical decisions  Management of ventilator-weaning with goal of extubation  Management of septic shock-currently on pressors, will wean as able, ongoing resuscitation with fluids and blood products as indicated.  Serial lactic acids will be followed  Ongoing infusion of antibiotics for infection/contamination  Pain control    Review of Systems  Review of Systems   Unable to perform ROS: Intubated        Vital Signs for last 24 hours  Temp:  [36.1 °C (96.9 °F)-36.7 °C (98.1 °F)] 36.1 °C (96.9 °F)  Pulse:  [] 103  Resp:  [20-43] 20  BP: ()/(45-63) 84/54  SpO2:  [91 %-100 %] 97 %    Hemodynamic parameters for last 24 hours       Respiratory Data     Respiration: 20, Pulse Oximetry: 97 %        RUL Breath Sounds: Expiratory Wheezes, RML Breath Sounds: Expiratory Wheezes, RLL Breath Sounds: Clear, DAVON Breath Sounds: Clear, LLL Breath Sounds: Clear    Physical Exam  Physical Exam  Constitutional:       Appearance: She is ill-appearing.      Comments: elderly   HENT:      Head:      Comments: ET tube in place  NG in place     Right Ear: External ear normal.      Left Ear: External ear normal.      Nose: Nose normal.   Eyes:      General:         Right eye: No discharge.         Left eye: No discharge.      Extraocular Movements: Extraocular movements intact.      Pupils: Pupils are equal, round, and reactive to light.   Neck:      Musculoskeletal: Normal range of motion.   Cardiovascular:      Rate and Rhythm: Normal rate and regular rhythm.      Pulses: Normal pulses.      Heart sounds: Normal heart sounds.    Pulmonary:      Breath sounds: No wheezing.      Comments: On vent  Abdominal:      General: There is distension.      Tenderness: There is abdominal tenderness.      Comments: Dressing in place   Genitourinary:     Comments: De La Garza in place  Musculoskeletal: Normal range of motion.   Skin:     General: Skin is warm and dry.   Neurological:      General: No focal deficit present.      Mental Status: She is oriented to person, place, and time.      Cranial Nerves: No cranial nerve deficit.   Psychiatric:      Comments: Unable to assess         Laboratory  Recent Results (from the past 24 hour(s))   EKG (NOW)    Collection Time: 20  9:00 AM   Result Value Ref Range    Report       [facility]    Test Date:  2020  Pt Name:    ERNESTO CORDERO                Department: ER  MRN:        [id number]                      Room:       [facility]  Gender:     Female                       Technician: [id number]  :        [id number]                   Requested By:ALIYAH TOSCANO  Order #:    [id number]                    Reading MD:    Measurements  Intervals                                Axis  Rate:       94                           P:          80  OK:         148                          QRS:        24  QRSD:       82                           T:          -6  QT:         396  QTc:        496    Interpretive Statements  SINUS RHYTHM  EARLY PRECORDIAL R/S TRANSITION  BORDERLINE REPOLARIZATION ABNORMALITY  BORDERLINE PROLONGED QT INTERVAL  No previous ECG available for comparison     LACTIC ACID    Collection Time: 20  9:19 AM   Result Value Ref Range    Lactic Acid 10.9 (HH) 0.5 - 2.0 mmol/L   CBC WITH DIFFERENTIAL    Collection Time: 20  9:19 AM   Result Value Ref Range    WBC 9.7 4.8 - 10.8 K/uL    RBC 3.89 (L) 4.20 - 5.40 M/uL    Hemoglobin 12.1 12.0 - 16.0 g/dL    Hematocrit 37.5 37.0 - 47.0 %    MCV 96.4 81.4 - 97.8 fL    MCH 31.1 27.0 - 33.0 pg    MCHC 32.3 (L) 33.6 - 35.0  g/dL    RDW 60.1 (H) 35.9 - 50.0 fL    Platelet Count 209 164 - 446 K/uL    MPV 11.3 9.0 - 12.9 fL    Neutrophils-Polys 76.50 (H) 44.00 - 72.00 %    Lymphocytes 0.90 (L) 22.00 - 41.00 %    Monocytes 3.50 0.00 - 13.40 %    Eosinophils 0.00 0.00 - 6.90 %    Basophils 0.00 0.00 - 1.80 %    Nucleated RBC 0.40 /100 WBC    Neutrophils (Absolute) 8.77 (H) 2.00 - 7.15 K/uL    Lymphs (Absolute) 0.09 (L) 1.00 - 4.80 K/uL    Monos (Absolute) 0.34 0.00 - 0.85 K/uL    Eos (Absolute) 0.00 0.00 - 0.51 K/uL    Baso (Absolute) 0.00 0.00 - 0.12 K/uL    NRBC (Absolute) 0.04 K/uL    Hypochromia 1+     Anisocytosis 1+     Macrocytosis 2+     Microcytosis 1+    Comp Metabolic Panel    Collection Time: 03/21/20  9:19 AM   Result Value Ref Range    Sodium 141 135 - 145 mmol/L    Potassium 3.0 (L) 3.6 - 5.5 mmol/L    Chloride 95 (L) 96 - 112 mmol/L    Co2 18 (L) 20 - 33 mmol/L    Anion Gap 28.0 (H) 7.0 - 16.0    Glucose 163 (H) 65 - 99 mg/dL    Bun 72 (HH) 8 - 22 mg/dL    Creatinine 1.98 (H) 0.50 - 1.40 mg/dL    Calcium 8.0 (L) 8.5 - 10.5 mg/dL    AST(SGOT) 30 12 - 45 U/L    ALT(SGPT) 25 2 - 50 U/L    Alkaline Phosphatase 102 (H) 30 - 99 U/L    Total Bilirubin 0.7 0.1 - 1.5 mg/dL    Albumin 1.9 (L) 3.2 - 4.9 g/dL    Total Protein 4.7 (L) 6.0 - 8.2 g/dL    Globulin 2.8 1.9 - 3.5 g/dL    A-G Ratio 0.7 g/dL   LIPASE    Collection Time: 03/21/20  9:19 AM   Result Value Ref Range    Lipase 10 (L) 11 - 82 U/L   TROPONIN    Collection Time: 03/21/20  9:19 AM   Result Value Ref Range    Troponin T 24 (H) 6 - 19 ng/L   APTT    Collection Time: 03/21/20  9:19 AM   Result Value Ref Range    APTT 26.1 24.7 - 36.0 sec   PROCALCITONIN    Collection Time: 03/21/20  9:19 AM   Result Value Ref Range    Procalcitonin 1.29 (H) <0.25 ng/mL   DIFFERENTIAL MANUAL    Collection Time: 03/21/20  9:19 AM   Result Value Ref Range    Bands-Stabs 13.90 (H) 0.00 - 10.00 %    Metamyelocytes 0.90 %    Myelocytes 4.30 %    Manual Diff Status PERFORMED    PERIPHERAL SMEAR  REVIEW    Collection Time: 03/21/20  9:19 AM   Result Value Ref Range    Peripheral Smear Review see below    PLATELET ESTIMATE    Collection Time: 03/21/20  9:19 AM   Result Value Ref Range    Plt Estimation Normal    MORPHOLOGY    Collection Time: 03/21/20  9:19 AM   Result Value Ref Range    RBC Morphology Present     Polychromia 2+     Poikilocytosis 3+     Ovalocytes 1+     Schistocytes 1+     Echinocytes 2+     Spherocytes 1+     Toxic Gran Marked     Dohle Bodies Few    ESTIMATED GFR    Collection Time: 03/21/20  9:19 AM   Result Value Ref Range    GFR If  29 (A) >60 mL/min/1.73 m 2    GFR If Non  24 (A) >60 mL/min/1.73 m 2   COD - Adult (Type and Screen)    Collection Time: 03/21/20  9:19 AM   Result Value Ref Range    ABO Grouping Only A     Rh Grouping Only POS     Antibody Screen-Cod NEG    URINALYSIS CULTURE, IF INDICATED    Collection Time: 03/21/20 10:34 AM   Result Value Ref Range    Color Yellow     Character Turbid (A)     Specific Gravity 1.020 <1.035    Ph 6.5 5.0 - 8.0    Glucose Negative Negative mg/dL    Ketones Trace (A) Negative mg/dL    Protein 100 (A) Negative mg/dL    Bilirubin Moderate (A) Negative    Urobilinogen, Urine 1.0 Negative    Nitrite Negative Negative    Leukocyte Esterase Moderate (A) Negative    Occult Blood Large (A) Negative    Micro Urine Req Microscopic    URINE MICROSCOPIC (W/UA)    Collection Time: 03/21/20 10:34 AM   Result Value Ref Range    WBC Packed (A) /hpf    RBC 5-10 (A) /hpf    Bacteria Many (A) None /hpf    Epithelial Cells Few /hpf    Epithelial Cells Renal Few /hpf    Hyaline Cast 6-10 (A) /lpf   LACTIC ACID    Collection Time: 03/21/20 10:47 AM   Result Value Ref Range    Lactic Acid 5.3 (HH) 0.5 - 2.0 mmol/L   Magnesium    Collection Time: 03/21/20 10:47 AM   Result Value Ref Range    Magnesium 2.1 1.5 - 2.5 mg/dL   ISTAT ARTERIAL BLOOD GAS    Collection Time: 03/21/20  3:01 PM   Result Value Ref Range    Ph 7.410 7.400 -  7.500    Pco2 37.9 (H) 26.0 - 37.0 mmHg    Po2 142 (H) 64 - 87 mmHg    Tco2 25 20 - 33 mmol/L    S02 99 93 - 99 %    Hco3 24.1 17.0 - 25.0 mmol/L    BE 0 -4 - 3 mmol/L    Body Temp 37.0 C degrees    O2 Therapy 58 %    iPF Ratio 245     Ph Temp Abdi 7.410 7.400 - 7.500    Pco2 Temp Co 37.9 (H) 26.0 - 37.0 mmHg    Po2 Temp Cor 142 (H) 64 - 87 mmHg    Specimen Arterial     Action Range Triggered YES     Inst. Qualified Patient YES    ISTAT GLUCOSE    Collection Time: 03/21/20  3:01 PM   Result Value Ref Range    Istat Glucose 103 (H) 65 - 99 mg/dL   ISTAT SODIUM    Collection Time: 03/21/20  3:01 PM   Result Value Ref Range    Istat Sodium 141 135 - 145 mmol/L   ISTAT POTASSIUM    Collection Time: 03/21/20  3:01 PM   Result Value Ref Range    Istat Potassium 2.5 (LL) 3.6 - 5.5 mmol/L   ISTAT IONIZED CA    Collection Time: 03/21/20  3:01 PM   Result Value Ref Range    Istat Ionized Calcium 0.91 (L) 1.10 - 1.30 mmol/L   ISTAT HEMATOCRIT AND HEMOGLOBIN    Collection Time: 03/21/20  3:01 PM   Result Value Ref Range    Istat Hematocrit 21 (L) 37 - 47 %    Istat Hemoglobin 7.1 (L) 12.0 - 16.0 g/dL   ABO Rh Confirm    Collection Time: 03/21/20  3:12 PM   Result Value Ref Range    ABO Rh Confirm A POS    Lactic Acid -STAT Once    Collection Time: 03/21/20  4:10 PM   Result Value Ref Range    Lactic Acid 1.7 0.5 - 2.0 mmol/L   CBC WITH DIFFERENTIAL    Collection Time: 03/21/20  4:10 PM   Result Value Ref Range    WBC 4.1 (L) 4.8 - 10.8 K/uL    RBC 2.69 (L) 4.20 - 5.40 M/uL    Hemoglobin 8.5 (L) 12.0 - 16.0 g/dL    Hematocrit 25.8 (L) 37.0 - 47.0 %    MCV 93.7 81.4 - 97.8 fL    MCH 31.6 27.0 - 33.0 pg    MCHC 33.7 33.6 - 35.0 g/dL    RDW 57.5 (H) 35.9 - 50.0 fL    Platelet Count 132 (L) 164 - 446 K/uL    MPV 11.2 9.0 - 12.9 fL    Neutrophils-Polys 76.30 (H) 44.00 - 72.00 %    Lymphocytes 3.50 (L) 22.00 - 41.00 %    Monocytes 0.00 0.00 - 13.40 %    Eosinophils 0.00 0.00 - 6.90 %    Basophils 0.00 0.00 - 1.80 %    Nucleated RBC  0.00 /100 WBC    Neutrophils (Absolute) 3.35 2.00 - 7.15 K/uL    Lymphs (Absolute) 0.14 (L) 1.00 - 4.80 K/uL    Monos (Absolute) 0.00 0.00 - 0.85 K/uL    Eos (Absolute) 0.00 0.00 - 0.51 K/uL    Baso (Absolute) 0.00 0.00 - 0.12 K/uL    NRBC (Absolute) 0.00 K/uL    Anisocytosis 1+     Microcytosis 1+    Triglycerides Starting now and then Every 3 Days    Collection Time: 03/21/20  4:10 PM   Result Value Ref Range    Triglycerides 85 0 - 149 mg/dL   DIFFERENTIAL MANUAL    Collection Time: 03/21/20  4:10 PM   Result Value Ref Range    Bands-Stabs 5.30 0.00 - 10.00 %    Metamyelocytes 6.10 %    Myelocytes 8.80 %    Manual Diff Status PERFORMED    PERIPHERAL SMEAR REVIEW    Collection Time: 03/21/20  4:10 PM   Result Value Ref Range    Peripheral Smear Review see below    PLATELET ESTIMATE    Collection Time: 03/21/20  4:10 PM   Result Value Ref Range    Plt Estimation Decreased    MORPHOLOGY    Collection Time: 03/21/20  4:10 PM   Result Value Ref Range    RBC Morphology Present     Poikilocytosis 2+     Ovalocytes 2+     Toxic Gran Marked        Fluids    Intake/Output Summary (Last 24 hours) at 3/21/2020 1713  Last data filed at 3/21/2020 1612  Gross per 24 hour   Intake 5110 ml   Output 2680 ml   Net 2430 ml       Core Measures & Quality Metrics  Labs reviewed and Medications reviewed  De La Garza catheter: Critically Ill - Requiring Accurate Measurement of Urinary Output      DVT Prophylaxis: Contraindicated - High bleeding risk  DVT prophylaxis - mechanical: SCDs  Ulcer prophylaxis: Yes  Antibiotics: Treating active infection/contamination beyond 24 hours perioperative coverage      NORBERT Score  ETOH Screening    Assessment/Plan  * Septic shock (HCC)  Assessment & Plan  Ongoing resuscitation with crystalloid and blood products as needed  On levophed ggt  Serial lactates      Pneumatosis intestinalis of small intestine  Assessment & Plan  Bowel obstruction with perforation  Repaired/released in OR  Dr. Interiano    Rectal  cancer (HCC)- (present on admission)  Assessment & Plan  Currently receiving xrt and chemotherapy        Discussed patient condition with RN, RT and general surgery.  CRITICAL CARE TIME EXCLUDING PROCEDURES: 33    minutes

## 2020-03-23 PROBLEM — R78.81 BACTEREMIA DUE TO ESCHERICHIA COLI: Status: ACTIVE | Noted: 2020-01-01

## 2020-03-23 PROBLEM — N30.00 ACUTE CYSTITIS WITHOUT HEMATURIA: Status: RESOLVED | Noted: 2020-01-01 | Resolved: 2020-01-01

## 2020-03-23 PROBLEM — B96.20 BACTEREMIA DUE TO ESCHERICHIA COLI: Status: ACTIVE | Noted: 2020-01-01

## 2020-03-23 NOTE — ASSESSMENT & PLAN NOTE
Recurrent hypokalemia, frequent PVC and PACs  Replete and trend with goal serum potassium above 4 and ensuring adequate serum magnesium levels

## 2020-03-23 NOTE — WOUND TEAM
[facility]  Inpatient Services  Initial Wound and Skin Care Evaluation    Admission Date: 3/21/2020     Last order of IP CONSULT TO WOUND CARE was found on 3/21/2020 from Hospital Encounter on 3/21/2020     HPI, PMH, SH: Reviewed    Unit where seen by Wound Team: S122/00     WOUND CONSULT/FOLLOW UP RELATED TO:  Buttocks wounds      Self Report / Pain Level:  No complaints of pain        OBJECTIVE:  In bed.  Xeroform gauze dressing in place.  Patient reports comfort with this dressing.  Had last radiation treatment on Friday and is not scheduled for anymore treatments at this time.      WOUND TYPE, LOCATION, CHARACTERISTICS (Pressure Injuries: location, stage, POA or date identified)      Wound 03/21/20 Radiation Buttocks;Rectum;Sacrum;Coccyx;Groin full thickness to sacrum/coccyx, partial thickness to buttocks (Active)   Wound Image    3/23/2020  4:00 PM   Site Assessment Pink;Yellow    Periwound Assessment Denuded    Margins Unattached edges    Closure None    Drainage Amount Small    Drainage Description Serosanguineous    Treatments Cleansed;Site care    Wound Cleansing Normal Saline Irrigation    Periwound Protectant Skin Protectant Wipes to Periwound    Dressing Cleansing/Solutions Not Applicable    Dressing Options Petroleum Gauze (clear);Mepilex    Dressing Changed Changed    Dressing Status Clean;Dry;Intact    Dressing Change/Treatment Frequency Daily, and As Needed    NEXT Dressing Change/Treatment Date 03/24/20    NEXT Weekly Photo (Inpatient Only) 03/30/20    Non-staged Wound Description Full thickness    Wound Length (cm) 11 cm    Wound Width (cm) 11 cm    Wound Surface Area (cm^2) 121 cm^2    WOUND NURSE ONLY - Time Spent with Patient (mins) 60    Number of days: 2     Vascular:    ARACELI:   No results found.    Lab Values:    Lab Results   Component Value Date/Time    WBC 2.8 (L) 03/23/2020 04:11 AM    RBC 2.29 (L) 03/23/2020 04:11 AM    HEMOGLOBIN 7.1 (L) 03/23/2020 04:11 AM    HEMATOCRIT  22.5 (L) 03/23/2020 04:11 AM      Culture Results show:  No results found for this or any previous visit (from the past 720 hour(s)).    INTERVENTIONS BY WOUND TEAM:  Patient helped to turn self to left side.  Removed previous dressing, cleansed with NS and gauze.  New photograph and measurements taken.  No sting skin prep to denuded areas and kody-wound.  Covered with layer of petrolatum gauze and sacral mepilex.  Pillows placed to right side, heels floated.         Interdisciplinary consultation: Patient, Bedside RN (Lela)     EVALUATION: patient with rectal cancer.  Admitted with SBO, micro perf.  NG in place.  Patient very comfortable after dressing change.      Goals: Steady decrease in wound area and depth weekly.    NURSING PLAN OF CARE ORDERS (X):    Dressing changes: See Dressing Care orders: X  Skin care: See Skin Care orders: X  Rectal tube care: See Rectal Tube Care orders:   Other orders:    RSKIN:   CURRENTLY IN PLACE (X), APPLIED THIS VISIT (A), ORDERED (O):   Q shift Darek:    Q shift pressure point assessments:    Pressure redistribution mattress            Low Airloss        ICU bed  Bariatric PRADIP         Bariatric foam           Heel float boots     Heel Silicone dressing        Float Heels off Bed with Pillows             X  Barrier wipes         Barrier Cream         Barrier paste          Sacral silicone dressing       X  Silicone O2 tubing         Anchorfast         Cannula fixation Device (Tender )          Gray Foam Ear protectors           Trach with Optifoam split foam                 Waffle cushion        Waffle Overlay         Rectal tube or BMS    Purwick/Condom Cath          Antifungal tx      Interdry          Reposition q 2 hours      X  Up to chair        Ambulate      PT/OT        Dietician        Diabetes Education      PO     TF     TPN     NPO  X # days   Other        WOUND TEAM PLAN OF CARE:   Dressing changes by wound team:          Follow up 1-2 times weekly:              X  Follow up 3 times weekly:                NPWT change 3 times weekly:     Follow up as needed:       Other (explain):     Anticipated discharge plans:   LTACH:        SNF/Rehab:                  Home Care:         Will need ongoing wound care post DC  Outpatient Wound Center:            Self Care:

## 2020-03-23 NOTE — PROGRESS NOTES
2 RN skin note with Janae SHIRLEY RN.    - Open wounds, discoloration tissue to sacral area and perineum area - mepilex with petroleum gauze in place - photo taken, wound consult placed  - Scab to lower lip - neosporin PRN   - Skin tear to RIGHT groin area - open to air  - Skin tear to BILATERAL breasts - open to air  - Bunions present BILATERAL feet, red but blanching  - Scattered skin discoloration, redness through out skin      Patient turned [id number], pillows in use for support, mepilex to sacral area, silicone nasal cannula with gray foams.

## 2020-03-23 NOTE — PROGRESS NOTES
Progress Note    Author:  Pamela Interiano MD    Date & Time:   3/23/2020   10:09 AM          Patient ID:             Name:             Elle Barksdale   YOB: 1940  Age:                 80 y.o.  female   MRN:               [id number]    ________________________________________________________________________      Interval Events:       Extubated, off pressors   Denies abdominal pain    NGT 620cc thick dark bile overnight  IS 700mL    Denies nausea  No flatus      All other systems reviewed and are negative.       Exam:       Vitals:    03/23/20 0707   BP:    Pulse:    Resp: 20   Temp:    SpO2:      SpO2  Min: 91 %  Max: 100 %  O2 (LPM)  Min: 2  Max: 3  FiO2%  Min: 30 %  Max: 50 %  Arterial MAP   Min: 53 mmHg  Max: 89 mmHg  Temp  Min: 34.9 °C (94.8 °F)  Max: 36.7 °C (98.1 °F)    Intake/Output Summary (Last 24 hours) at 3/23/2020 1009  Last data filed at 3/23/2020 0600  Gross per 24 hour   Intake 3832.89 ml   Output 3790 ml   Net 42.89 ml       DIET ORDERS (From admission to next 24h)     Start     Ordered    03/21/20 1259  Diet NPO  ALL MEALS     Question:  Restrict to:  Answer:  Strict    03/21/20 1300                    Physical Exam  Nursing note reviewed.   Constitutional:       Appearance: Normal appearance. Alert, oriented x 4.NAD  HENT:      Head: Normocephalic and atraumatic.      Mouth/Throat:      Mouth: Mucous membranes are dry  Eyes:      Pupils: Pupils are equal, round, and reactive to light.      No scleral Icterus present  Cardiovascular:      Rate and Rhythm: Normal rate and regular rhythm. Extremities warm, 2+ pitting edema BUE/BLE  Pulmonary:      Effort: Pulmonary effort is normal.      Breath sounds: no wheezes or ronchi    Abdominal:      General: Abdomen is flat. Soft, Appropriately tender around midline. Dressing dry    Recent Labs     03/21/20  1047  03/22/20  0500 03/22/20  1353 03/22/20  2202 03/23/20  0411   SODIUM  --    < > 144  --  150* 152*   POTASSIUM  --    < > 3.3* 3.3*  3.0* 3.1*   CHLORIDE  --    < > 105  --  108 108   CO2  --    < > 23  --  29 31   BUN  --    < > 63*  --  48* 42*   CREATININE  --    < > 1.29  --  0.90 0.77   MAGNESIUM 2.1  --  2.1  --   --  1.8   PHOSPHORUS  --   --  4.3  --   --  3.0   CALCIUM  --    < > 6.7*  --  7.0* 7.1*    < > = values in this interval not displayed.       Recent Labs     03/21/20  0919 03/22/20  0500 03/22/20  2202 03/23/20  0411   ALTSGPT 25  --  16  --  15   ASTSGOT 30 --  20  --  16   ALKPHOSPHAT 102*  --  83  --  79   TBILIRUBIN 0.7  --  0.7  --  0.5   LIPASE 10*  --   --   --   --    GLUCOSE 163*   < > 122* 125* 119*    < > = values in this interval not displayed.       Recent Labs     03/21/20 0919 03/21/20 1610 03/22/20  0500 03/23/20 0411   RBC 3.89* 2.69* 2.92* 2.29*   HEMOGLOBIN 12.1 8.5* 9.0* 7.1*   HEMATOCRIT 37.5 25.8* 28.0* 22.5*   PLATELETCT 209 132* 162* 70*   APTT 26.1  --   --   --        Recent Labs     03/21/20 0919 03/21/20 1610 03/22/20  0500 03/23/20 0411   WBC 9.7 4.1* 9.4 2.8*   NEUTSPOLYS 76.50* 76.30* 78.60* 93.50*   LYMPHOCYTES 0.90* 3.50* 0.00* 1.10*   MONOCYTES 3.50 0.00 0.90 2.70   EOSINOPHILS 0.00 0.00 0.00 0.60   BASOPHILS 0.00 0.00 7.70* 0.00   ASTSGOT 30  --  20 16   ALTSGPT 25  --  16 15   ALKPHOSPHAT 102*  --  83 79   TBILIRUBIN 0.7  --  0.7 0.5         ________________________________________________________________________      Patient Active Problem List   Diagnosis   • Osteoarthritis   • Blood per rectum   • Rectal cancer (HCC)   • Septic shock (HCC)   • Perforation of intestine (HCC)   • Acute kidney injury (HCC)   • Acute cystitis without hematuria   • Hypokalemia   • Respiratory failure following trauma and surgery (HCC)     Expectant management for ileus; patient had obstructive symptoms >1 weeks prior to coming to the hospital   Plan:  Monitor NGT output, remove later today if output continues to decrease  Okay for Ice chips    Up to chair    Encourage IS/Pulmonary toilet/SCDs

## 2020-03-23 NOTE — PROGRESS NOTES
2 RN skin check completed with KODAK Brothers:     -Scattered skin discoloration/redness to generalized integumentary  -Redness/blanching to bunions on bilateral feet  -Redness/skin tears to bilateral groin  -Open wounds, discoloration, necrotic appearing tissue to sacral region - per family, wounds are from radiation therapy - petroleum gauze and mepilex in place  -Scab to lower lip - neosporin in use PRN

## 2020-03-23 NOTE — PROGRESS NOTES
Updated MD regarding patients continuing bradycardia and pauses lasting 4 to 8 seconds, increasing in frequency with about 10 episodes this shift. Per MD, no further interventions to be performed at this time, no need to provide further updates regarding pauses/bradycardia.

## 2020-03-23 NOTE — CARE PLAN
Problem: Venous Thromboembolism (VTW)/Deep Vein Thrombosis (DVT) Prevention:  Goal: Patient will participate in Venous Thrombosis (VTE)/Deep Vein Thrombosis (DVT)Prevention Measures  Outcome: PROGRESSING AS EXPECTED  Intervention: Ensure patient wears graduated elastic stockings (LYNDSEY hose) and/or SCDs, if ordered, when in bed or chair (Remove at least once per shift for skin check)  Note: SCDs in use for mechanical prophylaxis. Lovenox n use for pharmacologic prophylaxis.     Problem: Bowel/Gastric:  Goal: Normal bowel function is maintained or improved  Outcome: PROGRESSING AS EXPECTED  Intervention: Educate patient and significant other/support system about diet, fluid intake, medications and activity to promote bowel function  Note: Patient with NG tube to low continuous suction post bowel surgery and strict NPO at this time. Will continue to monitor.

## 2020-03-23 NOTE — CARE PLAN
Problem: Communication  Goal: The ability to communicate needs accurately and effectively will improve  Outcome: PROGRESSING AS EXPECTED  Intervention: Educate patient and significant other/support system about the plan of care, procedures, treatments, medications and allow for questions  Note: Patient and family updated on POC.  All questions answered at this time.       Problem: Skin Integrity  Goal: Risk for impaired skin integrity will decrease  Outcome: PROGRESSING AS EXPECTED  Intervention: Implement precautions to protect skin integrity in collaboration with the interdisciplinary team  Note: Complete skin assessment done.  Wounds noted and wound consult placed.  Patient turned [id number], pillows in use for support and mepilex in place.

## 2020-03-23 NOTE — PROGRESS NOTES
Trauma / Surgical Daily Progress Note    Date of Service  3/23/2020    Chief Complaint  80 y.o. female admitted 3/21/2020 with Septic shock from perforated small bowel in the setting of neoadjuvant chemotherapy and radiation for Stage II rectal cancer.    Interval Events  Extubated yesterday; weak, nonproductive cough.  NG output remains high  Patient reports good pain control without any medications  Intermittent, asymptomatic and self limited bradycardia  Weaning off pressors    Review of Systems  Review of Systems     Vital Signs for last 24 hours  Pulse:  [] 94  Resp:  [12-42] 20  BP: ()/(50-58) 92/55  SpO2:  [91 %-97 %] 95 %    Hemodynamic parameters for last 24 hours       Respiratory Data     Respiration: 20, Pulse Oximetry: 95 %     Work Of Breathing / Effort: Mild  RUL Breath Sounds: Clear, RML Breath Sounds: Clear, RLL Breath Sounds: Clear, DAVON Breath Sounds: Clear, LLL Breath Sounds: Clear    Physical Exam  Physical Exam  Vitals signs and nursing note reviewed.   Constitutional:       Comments: Sleepy, awakens to voice, NAD   HENT:      Head: Normocephalic and atraumatic.      Right Ear: External ear normal.      Left Ear: External ear normal.      Nose:      Comments: NG in place     Mouth/Throat:      Mouth: Mucous membranes are dry.   Eyes:      Extraocular Movements: Extraocular movements intact.      Pupils: Pupils are equal, round, and reactive to light.   Neck:      Musculoskeletal: Normal range of motion and neck supple.   Cardiovascular:      Rate and Rhythm: Normal rate and regular rhythm.      Pulses: Normal pulses.      Heart sounds: Normal heart sounds.   Pulmonary:      Comments: Weak cough, lungs clear  Abdominal:      Comments: Dressings in place, abdomen soft   Genitourinary:     Comments: De La Garza in place  Musculoskeletal:         General: Swelling present. No tenderness.   Skin:     General: Skin is warm and dry.   Neurological:      General: No focal deficit present.    Psychiatric:         Mood and Affect: Mood normal.         Behavior: Behavior normal.         Laboratory  Recent Results (from the past 24 hour(s))   Basic Metabolic Panel    Collection Time: 03/22/20 10:02 PM   Result Value Ref Range    Sodium 150 (H) 135 - 145 mmol/L    Potassium 3.0 (L) 3.6 - 5.5 mmol/L    Chloride 108 96 - 112 mmol/L    Co2 29 20 - 33 mmol/L    Glucose 125 (H) 65 - 99 mg/dL    Bun 48 (H) 8 - 22 mg/dL    Creatinine 0.90 0.50 - 1.40 mg/dL    Calcium 7.0 (L) 8.5 - 10.5 mg/dL    Anion Gap 13.0 7.0 - 16.0   ESTIMATED GFR    Collection Time: 03/22/20 10:02 PM   Result Value Ref Range    GFR If African American >60 >60 mL/min/1.73 m 2    GFR If Non African American >60 >60 mL/min/1.73 m 2   Ionized Calcium    Collection Time: 03/23/20  4:11 AM   Result Value Ref Range    Ionized Calcium 1.0 (L) 1.1 - 1.3 mmol/L   CBC WITH DIFFERENTIAL    Collection Time: 03/23/20  4:11 AM   Result Value Ref Range    WBC 2.8 (L) 4.8 - 10.8 K/uL    RBC 2.29 (L) 4.20 - 5.40 M/uL    Hemoglobin 7.1 (L) 12.0 - 16.0 g/dL    Hematocrit 22.5 (L) 37.0 - 47.0 %    MCV 98.3 (H) 81.4 - 97.8 fL    MCH 31.0 27.0 - 33.0 pg    MCHC 31.6 (L) 33.6 - 35.0 g/dL    RDW 59.8 (H) 35.9 - 50.0 fL    Platelet Count 70 (L) 164 - 446 K/uL    MPV 11.3 9.0 - 12.9 fL    Neutrophils-Polys 93.50 (H) 44.00 - 72.00 %    Lymphocytes 1.10 (L) 22.00 - 41.00 %    Monocytes 2.70 0.00 - 13.40 %    Eosinophils 0.60 0.00 - 6.90 %    Basophils 0.00 0.00 - 1.80 %    Nucleated RBC 0.00 /100 WBC    Neutrophils (Absolute) 2.62 2.00 - 7.15 K/uL    Lymphs (Absolute) 0.03 (L) 1.00 - 4.80 K/uL    Monos (Absolute) 0.08 0.00 - 0.85 K/uL    Eos (Absolute) 0.02 0.00 - 0.51 K/uL    Baso (Absolute) 0.00 0.00 - 0.12 K/uL    NRBC (Absolute) 0.00 K/uL    Anisocytosis 1+     Microcytosis 1+    Comp Metabolic Panel    Collection Time: 03/23/20  4:11 AM   Result Value Ref Range    Sodium 152 (H) 135 - 145 mmol/L    Potassium 3.1 (L) 3.6 - 5.5 mmol/L    Chloride 108 96 - 112 mmol/L     Co2 31 20 - 33 mmol/L    Anion Gap 13.0 7.0 - 16.0    Glucose 119 (H) 65 - 99 mg/dL    Bun 42 (H) 8 - 22 mg/dL    Creatinine 0.77 0.50 - 1.40 mg/dL    Calcium 7.1 (L) 8.5 - 10.5 mg/dL    AST(SGOT) 16 12 - 45 U/L    ALT(SGPT) 15 2 - 50 U/L    Alkaline Phosphatase 79 30 - 99 U/L    Total Bilirubin 0.5 0.1 - 1.5 mg/dL    Albumin 1.5 (L) 3.2 - 4.9 g/dL    Total Protein 3.4 (L) 6.0 - 8.2 g/dL    Globulin 1.9 1.9 - 3.5 g/dL    A-G Ratio 0.8 g/dL   MAGNESIUM    Collection Time: 20  4:11 AM   Result Value Ref Range    Magnesium 1.8 1.5 - 2.5 mg/dL   PHOSPHORUS    Collection Time: 20  4:11 AM   Result Value Ref Range    Phosphorus 3.0 2.5 - 4.5 mg/dL   ESTIMATED GFR    Collection Time: 20  4:11 AM   Result Value Ref Range    GFR If African American >60 >60 mL/min/1.73 m 2    GFR If Non African American >60 >60 mL/min/1.73 m 2   DIFFERENTIAL MANUAL    Collection Time: 20  4:11 AM   Result Value Ref Range    Metamyelocytes 1.60 %    Myelocytes 0.50 %    Manual Diff Status PERFORMED    PERIPHERAL SMEAR REVIEW    Collection Time: 20  4:11 AM   Result Value Ref Range    Peripheral Smear Review see below    PLATELET ESTIMATE    Collection Time: 20  4:11 AM   Result Value Ref Range    Plt Estimation Decreased    MORPHOLOGY    Collection Time: 20  4:11 AM   Result Value Ref Range    RBC Morphology Present     Giant Platelets 1+     Polychromia 1+     Poikilocytosis 2+     Ovalocytes 2+     Echinocytes 1+     Toxic Gran Marked    EKG    Collection Time: 20 11:53 AM   Result Value Ref Range    Report       [facility]    Test Date:  2020  Pt Name:    ERNESTO CORDERO                Department: 19  MRN:        [id number]                      Room:       S122  Gender:     Female                       Technician: [facility]  :        [id number]                   Requested By:JANICE GORMAN  Order #:    [id number]                    Reading MD:    Measurements  Intervals                                 Axis  Rate:       116                          P:          20  WI:         133                          QRS:        -1  QRSD:       79                           T:          181  QT:         327  QTc:        455    Interpretive Statements  SINUS TACHYCARDIA  ATRIAL PREMATURE COMPLEX  BORDERLINE REPOLARIZATION ABNORMALITY  BASELINE WANDER IN LEAD(S) V6  Compared to ECG 03/21/2020 21:35:22  Atrial premature complex(es) now present  Wandering atrial pacemaker no longer present  T-wave abnormality no longer present     POTASSIUM SERUM (K)    Collection Time: 03/23/20  1:18 PM   Result Value Ref Range    Potassium 3.2 (L) 3.6 - 5.5 mmol/L       Fluids    Intake/Output Summary (Last 24 hours) at 3/23/2020 1427  Last data filed at 3/23/2020 0600  Gross per 24 hour   Intake 2641.01 ml   Output 3415 ml   Net -773.99 ml       Core Measures & Quality Metrics  Labs reviewed, Medications reviewed and Radiology images reviewed  De La Garza catheter: Critically Ill - Requiring Accurate Measurement of Urinary Output  Central line in place: Need for access    DVT Prophylaxis: Enoxaparin (Lovenox)  DVT prophylaxis - mechanical: SCDs  Ulcer prophylaxis: No  Antibiotics: Treating active infection/contamination beyond 24 hours perioperative coverage      NORBERT Score  ETOH Screening    Assessment/Plan  * Septic shock (HCC)  Assessment & Plan  Ongoing resuscitation with crystalloid and blood products as needed  On levophed and vasopressin ggt-weaning drips as able  Lactates have normalized  3/23 Zosyn day #3 of 7      Bacteremia due to Escherichia coli  Assessment & Plan  3/22 Blood cultures x 2 and urine culture positive for E. Coli  Appropriate antibiotics in place    Respiratory failure following trauma and surgery ([facility])  Assessment & Plan  Remained on the ventilator post operation  3/22 extubated  3/23 weak cough, very high risk of decompensation and need for reintubation    Perforation of intestine ([facility])  Assessment &  Plan  Bowel obstruction with perforation  Repaired/released in OR  Dr. Interiano    Rectal cancer (HCC)- (present on admission)  Assessment & Plan  Currently receiving xrt and chemotherapy    Hypokalemia- (present on admission)  Assessment & Plan  Recurrent hypokalemia, frequent PVC and PACs  Replete and trend with goal serum potassium above 4 and ensuring adequate serum magnesium levels    Acute kidney injury (HCC)  Assessment & Plan  Initial creatinine 1.98  Improving with resuscitation  Follow parameters and avoid nephrotoxins  Plan:  Will start stress ulcer prophylaxis  Dr. Interiano managing NG tube and diet, await resolution of ileus  Pain seems controlled, low dose oxycodone if needed for respiratory function  Zosyn in place for 7 days at least in this very immunosuppressed elderly patient. Strict infection prevention measures.  Vasopressors for shock - goal MAP >60 - 65  OOB to chair and walk in room if possible today.   Hypocalcemia - repleted; trend  Rechecking Mag level  I looked carefully at her episodes of bradycardia - will get official EKG but she is self limited and asymptomatic so will continue to watch for now. Likely will help to get electrolytes normalized.  Full code.     Discussed patient condition with RN, RT and Pharmacy.  The patient is/remains critically ill with respiratory failure, perforated viscus, bacteremia, cancer, immunosuppression. Patient is at high risk for decompensation with the threat of imminent deterioration, life threatening deterioration, and loss of vital organ function.    I provided the following critical care service: management of above, high risk medication management    Critical care time spent exclusive of procedures: 84 minutes.    Alfredo Seymour MD  [phone]

## 2020-03-24 PROBLEM — D69.6 THROMBOCYTOPENIA (HCC): Status: ACTIVE | Noted: 2020-01-01

## 2020-03-24 NOTE — PROGRESS NOTES
Trauma / Surgical Daily Progress Note    Date of Service  3/24/2020    Chief Complaint  80 y.o. female admitted 3/21/2020 with Septic shock from perforated small bowel in the setting of neoadjuvant chemotherapy and radiation for Stage II rectal cancer.    Interval Events  Remains critically ill  NG output significantly elevated yesterday and overnight.   Patient reports good pain control without any medications  OOB to chair yesterday.  Intermittent, asymptomatic and self limited bradycardia better overall with fewer overall episodes.  Aggressive electrolyte repletion  Pressors off    Review of Systems  Review of Systems       Vital Signs for last 24 hours  Pulse:  [62-99] 80  Resp:  [15-37] 18  BP: ()/(52-71) 123/63  SpO2:  [90 %-99 %] 96 %    Hemodynamic parameters for last 24 hours       Respiratory Data     Respiration: 18, Pulse Oximetry: 96 %     Work Of Breathing / Effort: Mild  RUL Breath Sounds: Clear, RML Breath Sounds: Clear, RLL Breath Sounds: Diminished, DAVON Breath Sounds: Clear, LLL Breath Sounds: Diminished    Physical Exam  Physical Exam  Vitals signs and nursing note reviewed.   Constitutional:       Comments: Pleasant, NAD   HENT:      Head: Normocephalic and atraumatic.      Right Ear: External ear normal.      Left Ear: External ear normal.      Nose:      Comments: NG in place     Mouth/Throat:      Mouth: Mucous membranes are dry.   Eyes:      Extraocular Movements: Extraocular movements intact.      Pupils: Pupils are equal, round, and reactive to light.   Neck:      Musculoskeletal: Normal range of motion and neck supple.   Cardiovascular:      Rate and Rhythm: Normal rate and regular rhythm.      Pulses: Normal pulses.      Heart sounds: Normal heart sounds.   Pulmonary:      Comments: Weak cough, lungs clear  Abdominal:      Comments: Dressings in place, abdomen soft   Genitourinary:     Comments: De La Garza in place  Musculoskeletal:         General: Swelling present. No tenderness.    Skin:     General: Skin is warm and dry.   Neurological:      General: No focal deficit present.   Psychiatric:         Mood and Affect: Mood normal.         Behavior: Behavior normal.         Laboratory  Recent Results (from the past 24 hour(s))   POTASSIUM SERUM (K)    Collection Time: 03/23/20  1:18 PM   Result Value Ref Range    Potassium 3.2 (L) 3.6 - 5.5 mmol/L   POTASSIUM SERUM (K)    Collection Time: 03/23/20  5:44 PM   Result Value Ref Range    Potassium 3.6 3.6 - 5.5 mmol/L   POTASSIUM SERUM (K)    Collection Time: 03/23/20 11:40 PM   Result Value Ref Range    Potassium 3.9 3.6 - 5.5 mmol/L   Comp Metabolic Panel    Collection Time: 03/24/20  5:20 AM   Result Value Ref Range    Sodium 155 (H) 135 - 145 mmol/L    Potassium 3.8 3.6 - 5.5 mmol/L    Chloride 115 (H) 96 - 112 mmol/L    Co2 30 20 - 33 mmol/L    Anion Gap 10.0 7.0 - 16.0    Glucose 94 65 - 99 mg/dL    Bun 32 (H) 8 - 22 mg/dL    Creatinine 0.59 0.50 - 1.40 mg/dL    Calcium 7.1 (L) 8.5 - 10.5 mg/dL    AST(SGOT) 17 12 - 45 U/L    ALT(SGPT) 14 2 - 50 U/L    Alkaline Phosphatase 91 30 - 99 U/L    Total Bilirubin 0.5 0.1 - 1.5 mg/dL    Albumin 1.6 (L) 3.2 - 4.9 g/dL    Total Protein 4.0 (L) 6.0 - 8.2 g/dL    Globulin 2.4 1.9 - 3.5 g/dL    A-G Ratio 0.7 g/dL   CBC WITH DIFFERENTIAL    Collection Time: 03/24/20  5:20 AM   Result Value Ref Range    WBC 2.7 (L) 4.8 - 10.8 K/uL    RBC 2.53 (L) 4.20 - 5.40 M/uL    Hemoglobin 7.8 (L) 12.0 - 16.0 g/dL    Hematocrit 25.6 (L) 37.0 - 47.0 %    .2 (H) 81.4 - 97.8 fL    MCH 30.8 27.0 - 33.0 pg    MCHC 30.5 (L) 33.6 - 35.0 g/dL    RDW 63.9 (H) 35.9 - 50.0 fL    Platelet Count 81 (L) 164 - 446 K/uL    MPV 11.0 9.0 - 12.9 fL    Neutrophils-Polys 79.00 (H) 44.00 - 72.00 %    Lymphocytes 2.00 (L) 22.00 - 41.00 %    Monocytes 4.00 0.00 - 13.40 %    Eosinophils 0.00 0.00 - 6.90 %    Basophils 0.00 0.00 - 1.80 %    Nucleated RBC 0.00 /100 WBC    Neutrophils (Absolute) 2.38 2.00 - 7.15 K/uL    Lymphs (Absolute)  0.05 (L) 1.00 - 4.80 K/uL    Monos (Absolute) 0.11 0.00 - 0.85 K/uL    Eos (Absolute) 0.00 0.00 - 0.51 K/uL    Baso (Absolute) 0.00 0.00 - 0.12 K/uL    NRBC (Absolute) 0.00 K/uL    Anisocytosis 1+     Macrocytosis 1+    MAGNESIUM    Collection Time: 03/24/20  5:20 AM   Result Value Ref Range    Magnesium 2.2 1.5 - 2.5 mg/dL   PHOSPHORUS    Collection Time: 03/24/20  5:20 AM   Result Value Ref Range    Phosphorus 2.7 2.5 - 4.5 mg/dL   ESTIMATED GFR    Collection Time: 03/24/20  5:20 AM   Result Value Ref Range    GFR If African American >60 >60 mL/min/1.73 m 2    GFR If Non African American >60 >60 mL/min/1.73 m 2   DIFFERENTIAL MANUAL    Collection Time: 03/24/20  5:20 AM   Result Value Ref Range    Bands-Stabs 9.00 0.00 - 10.00 %    Metamyelocytes 4.00 %    Myelocytes 2.00 %    Manual Diff Status PERFORMED    PERIPHERAL SMEAR REVIEW    Collection Time: 03/24/20  5:20 AM   Result Value Ref Range    Peripheral Smear Review see below    PLATELET ESTIMATE    Collection Time: 03/24/20  5:20 AM   Result Value Ref Range    Plt Estimation Decreased    MORPHOLOGY    Collection Time: 03/24/20  5:20 AM   Result Value Ref Range    RBC Morphology Present     Giant Platelets 1+     Poikilocytosis 2+     Ovalocytes 2+     Echinocytes 1+     Toxic Gran Marked    POTASSIUM SERUM (K)    Collection Time: 03/24/20 12:00 PM   Result Value Ref Range    Potassium 3.7 3.6 - 5.5 mmol/L       Fluids    Intake/Output Summary (Last 24 hours) at 3/24/2020 1315  Last data filed at 3/24/2020 1200  Gross per 24 hour   Intake 3762.09 ml   Output 3200 ml   Net 562.09 ml       Core Measures & Quality Metrics  Labs reviewed, Medications reviewed and Radiology images reviewed  De La Garza catheter: Critically Ill - Requiring Accurate Measurement of Urinary Output  Central line in place: Need for access    DVT Prophylaxis: Enoxaparin (Lovenox)  DVT prophylaxis - mechanical: SCDs  Ulcer prophylaxis: No  Antibiotics: Treating active infection/contamination  beyond 24 hours perioperative coverage      NORBERT Score    ETOH Screening      Assessment/Plan  * Septic shock (HCC)  Assessment & Plan  Ongoing resuscitation with crystalloid and blood products as needed  On levophed and vasopressin ggt-weaning drips 3/23  3/24 Zosyn day #4 of 7 (7 day course given her significant immunosuppression)      Bacteremia due to Escherichia coli  Assessment & Plan  3/22 Blood cultures x 2 and urine culture positive for E. Coli  Appropriate antibiotics in place    Respiratory failure following trauma and surgery ([facility])  Assessment & Plan  Remained on the ventilator post operation  3/22 extubated  3/23 weak cough  3/24 remains high risk of decompensation and need for reintubation    Perforation of intestine ([facility])  Assessment & Plan  Bowel obstruction with perforation  Repaired/released in OR  Dr. Interiano    Rectal cancer ([facility])- (present on admission)  Assessment & Plan  Receiving xrt and chemotherapy up until hospital admission for SBO  Dr. Chappell, oncology (not consulted as inpatient)    Thrombocytopenia ([facility])  Assessment & Plan  Likely sepsis associated  3/24/2020 slight improvement, transitioned famotidine to pantoprazole  Trend    Hypokalemia- (present on admission)  Assessment & Plan  Recurrent hypokalemia, frequent PVC and PACs  Replete and trend with goal serum potassium above 4 and ensuring adequate serum magnesium levels    Acute kidney injury ([facility])  Assessment & Plan  Initial creatinine 1.98  Improving with resuscitation  Follow parameters and avoid nephrotoxins  Plan:  Will change stress ulcer prophylaxis  Await resolution of ileus, follow NG output and flatus/BM.   Pain seems controlled, low dose oxycodone if needed for respiratory function  Zosyn in place for 7 days at least in this very immunosuppressed elderly patient. Strict infection prevention measures. Will contact oncology today to be sure we are not missing the opportunity for neuopogen?  Therapies, ambulate if  possible  She has been NPO for a long period of time but I am hesitant to start TPN due to her significant immunosuppression. Will revisit issue every 12 hours or so.      Full code.     Discussed patient condition with RN, RT and Pharmacy.  The patient is/remains critically ill with respiratory failure, perforated viscus, bacteremia, cancer, immunosuppression. Patient is at high risk for decompensation with the threat of imminent deterioration, life threatening deterioration, and loss of vital organ function.    I provided the following critical care service: management of above, high risk medication management    Critical care time spent exclusive of procedures: 44 minutes.    Alfredo Seymour MD  [phone]

## 2020-03-24 NOTE — PROGRESS NOTES
2 RN skin note with KODAK Robins.    - Open wounds, redness/excoriation to sacral area and perineum area - mepilex with petroleum gauze in place - seen by wound team today  - Scab to lower lip - neosporin PRN   - Skin tear to RIGHT groin area - open to air  - Skin tear to BILATERAL breasts - open to air  - Bunions present BILATERAL feet, red but blanching  - Scattered skin discoloration and bruising.      Interventions: Q2 turns, pillows in use for support, elbows and heels floated, mepilex and petroleum gauze dressing to sacral area, mepilex under wheatley cath and bilat heels, silicone nasal cannula with gray foams.

## 2020-03-24 NOTE — THERAPY
"Occupational Therapy Evaluation completed.   Functional Status: Pt is an 80 y.o. female admitted 3/21/2020 with Septic shock from perforated small bowel in the setting of neoadjuvant chemotherapy and radiation for Stage II rectal cancer.  Pt sitting up in recliner chair on arrival.  Pt alert & very spunky & motivated to regain her independence.  Pt noted to have increased edema BUE & LE.  Pt educated to perform AROM BUE & keep BUE elevated on pillows to reduce edema.  Pt did require Mod A to scoot to edge of recliner due to sores on her bottom.  Pt was Mod A for sit-stand from recliner chair.  Pt initially had a strong post lean but improved with V/Q's & FWW.  Pt currently required Max A for LB dressing due to pain & edema.  Pt did require Min A for simple grooming tasks limited by UE edema.  Pt requested to return to recliner chair after tx.  Plan of Care: Will benefit from Occupational Therapy 4 times per week  Discharge Recommendations:  Equipment: Will Continue to Assess for Equipment Needs. Post-acute therapy Recommend post-acute placement for additional occupational therapy services prior to discharge home.    Pt was an independent woman living alone PTA in her own 2 story home.  Pt would be a great Rehab candidate to regain her independence prior to D/C home.    See \"Rehab Therapy-Acute\" Patient Summary Report for complete documentation.    "

## 2020-03-24 NOTE — DISCHARGE PLANNING
Care Transition Team Assessment     This LSW met with pt's daughter, Nerissa Barksdale, and obtained the following information used in this assessment. Nerissa verified accuracy of facesheet. Marital status is  and pt has three adult children who all live local. Pt lives in a two level house alone. Prior to current hospitalization, pt was completely independent in ADLS/IADLS. 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 Per Nerissa, pt goes and helps her daily. Nerissa also stated that pt still works. Nerissa reports pt's pharmacy is [facility]. Pt has SCP coverage. Pt has a good support system. No hx of substance use/abuse and denies any hx of mh.     DC goal is to return home. Pt may need to be seen by [place] to assist with dc planning.     This LSW to continue to follow for any continued needs.      Care Transition Team Assessment    Information Source  Orientation : Oriented x 4  Information Given By: Patient, Relative  Informant's Name: DTR - Nerissa Barksdale  Who is responsible for making decisions for patient? : Patient    Readmission Evaluation  Is this a readmission?: No    Elopement Risk  Legal Hold: No  Ambulatory or Self Mobile in Wheelchair: No-Not an Elopement Risk  Elopement Risk: Not at Risk for Elopement    Interdisciplinary Discharge Planning  Lives with - Patient's Self Care Capacity: Alone and Able to Care For Self  Support Systems: Children, Friends / Neighbors  Housing / Facility: 2 Story House  Mobility Issues: No  Prior Services: Home-Independent    Discharge Preparedness  What is your plan after discharge?: Uncertain - pending medical team collaboration, Skilled nursing facility, Home health care  What are your discharge supports?: Child  Prior Functional Level: Ambulatory, Drives Self, Independent with Activities of Daily Living, Independent with Medication Management    Functional Assesment  Prior Functional Level: Ambulatory, Drives Self, Independent  with Activities of Daily Living, Independent with Medication Management    Finances  Financial Barriers to Discharge: No  Prescription Coverage: Yes(SCP)    Advance Directive  Advance Directive?: None  Advance Directive offered?: AD Booklet refused    Psychological Assessment  History of Substance Abuse: None  History of Psychiatric Problems: No  Non-compliant with Treatment: No  Newly Diagnosed Illness: No    Discharge Risks or Barriers  Discharge risks or barriers?: No  Patient risk factors: Complex medical needs    Anticipated Discharge Information  Anticipated discharge disposition: Acute rehab, Discharge needs currently unknown, SNF

## 2020-03-24 NOTE — CARE PLAN
Problem: Hyperinflation:  Goal: Prevent or improve atelectasis  Outcome: PROGRESSING AS EXPECTED   PEP QID  Is 1000  2L NC

## 2020-03-24 NOTE — CARE PLAN
Problem: Venous Thromboembolism (VTW)/Deep Vein Thrombosis (DVT) Prevention:  Goal: Patient will participate in Venous Thrombosis (VTE)/Deep Vein Thrombosis (DVT)Prevention Measures  Note: SCDs in place and turned on. RN administers SC lovenox.     Problem: Bowel/Gastric:  Goal: Normal bowel function is maintained or improved  Note: NG to low continuous suction. Strict I/O monitoring in place.     Problem: Respiratory:  Goal: Respiratory status will improve  Note: RN encourages coughing and deep breathing exercises. Pt pulls 750-1000 of air on the IS.

## 2020-03-24 NOTE — CARE PLAN
Problem: Communication  Goal: The ability to communicate needs accurately and effectively will improve  Outcome: PROGRESSING AS EXPECTED     Problem: Safety  Goal: Will remain free from injury  Outcome: PROGRESSING AS EXPECTED     Problem: Venous Thromboembolism (VTW)/Deep Vein Thrombosis (DVT) Prevention:  Goal: Patient will participate in Venous Thrombosis (VTE)/Deep Vein Thrombosis (DVT)Prevention Measures  Outcome: PROGRESSING AS EXPECTED     Problem: Pain Management  Goal: Pain level will decrease to patient's comfort goal  Outcome: PROGRESSING AS EXPECTED     Problem: Respiratory:  Goal: Respiratory status will improve  Outcome: PROGRESSING AS EXPECTED

## 2020-03-24 NOTE — PROGRESS NOTES
Progress Note    Author:  Pamela Interiano MD    Date & Time:   3/24/2020   9:10 AM          Patient ID:             Name:             Elle Barksdale   YOB: 1940  Age:                 80 y.o.  female   MRN:               [id number]    ________________________________________________________________________      Interval Events:       Patient up to chair  Denies abdominal pain, no nausea      NGT 1200cc thick bilous output yesterday, 300cc overnight  No flatus    Review of Systems          Exam:       Vitals:    03/24/20 0800   BP: 128/71   Pulse: 90   Resp: 18   Temp:    SpO2: 93%     SpO2  Min: 90 %  Max: 100 %  O2 (LPM)  Min: 2  Max: 3  FiO2%  Min: 30 %  Max: 50 %  Arterial MAP   Min: 53 mmHg  Max: 91 mmHg  Temp  Min: 34.9 °C (94.8 °F)  Max: 36.7 °C (98.1 °F)    Intake/Output Summary (Last 24 hours) at 3/24/2020 0910  Last data filed at 3/24/2020 0600  Gross per 24 hour   Intake 2899.59 ml   Output 2845 ml   Net 54.59 ml       DIET ORDERS (From admission to next 24h)     Start     Ordered    03/21/20 1259  Diet NPO  ALL MEALS     Question:  Restrict to:  Answer:  Strict    03/21/20 1300                    Physical Exam  Nursing note reviewed.   Constitutional:       Appearance: Normal appearance. Alert, oriented x 4.NAD  HENT:      Head: Normocephalic and atraumatic.      Mouth/Throat:      Mouth: Mucous membranes are dry  Eyes:      Pupils: Pupils are equal, round, and reactive to light.      No scleral Icterus present  Cardiovascular:      Rate and Rhythm: Normal rate and regular rhythm. Extremities warm, 2+ BUE/BLE edema  Pulmonary:      Effort: Pulmonary effort is normal.      Breath sounds: No wheezes or stridor  Abdominal:      General: Abdomen is soft, non-distended. TTP right abdomen, no rebound or guarding  Dressing dry    Recent Labs     03/22/20  0500  03/22/20  2202 03/23/20  0411  03/23/20  1744 03/23/20  2340 03/24/20  0520   SODIUM 144  --  150* 152*  --   --   --  155*   POTASSIUM  3.3*   < > 3.0* 3.1*   < > 3.6 3.9 3.8   CHLORIDE 105  --  108 108  --   --   --  115*   CO2 23  --  29 31  --   --   --  30   BUN 63*  --  48* 42*  --   --   --  32*   CREATININE 1.29  --  0.90 0.77  --   --   --  0.59   MAGNESIUM 2.1  --   --  1.8  --   --   --  2.2   PHOSPHORUS 4.3  --   --  3.0  --   --   --  2.7   CALCIUM 6.7*  --  7.0* 7.1*  --   --   --  7.1*    < > = values in this interval not displayed.       Recent Labs     03/21/20 0919 03/22/20 0500 03/22/20 2202 03/23/20 0411 03/24/20 0520   ALTSGPT 25  --  16  --  15 14   ASTSGOT 30  --  20  --  16 17   ALKPHOSPHAT 102*  --  83  --  79 91   TBILIRUBIN 0.7  --  0.7  --  0.5 0.5   LIPASE 10*  --   --   --   --   --    GLUCOSE 163*   < > 122* 125* 119* 94    < > = values in this interval not displayed.       Recent Labs     03/21/20 0919 03/22/20 0500 03/23/20 0411 03/24/20 0520   RBC 3.89*   < > 2.92* 2.29* 2.53*   HEMOGLOBIN 12.1   < > 9.0* 7.1* 7.8*   HEMATOCRIT 37.5   < > 28.0* 22.5* 25.6*   PLATELETCT 209   < > 162* 70* 81*   APTT 26.1  --   --   --   --     < > = values in this interval not displayed.       Recent Labs     03/22/20 0500 03/23/20 0411 03/24/20 0520   WBC 9.4 2.8* 2.7*   NEUTSPOLYS 78.60* 93.50* 79.00*   LYMPHOCYTES 0.00* 1.10* 2.00*   MONOCYTES 0.90 2.70 4.00   EOSINOPHILS 0.00 0.60 0.00   BASOPHILS 7.70* 0.00 0.00   ASTSGOT 20 16 17   ALTSGPT 16 15 14   ALKPHOSPHAT 83 79 91   TBILIRUBIN 0.7 0.5 0.5         ________________________________________________________________________      Patient Active Problem List   Diagnosis   • Osteoarthritis   • Blood per rectum   • Rectal cancer (HCC)   • Septic shock (HCC)   • Perforation of intestine (HCC)   • Acute kidney injury (HCC)   • Hypokalemia   • Respiratory failure following trauma and surgery (HCC)   • Bacteremia due to Escherichia coli     Thrombocytopenia: cause likely multifactorial  Plan:  D/c pepcid, monitor platelets ---Discussed with Dr. Seymour  Monitor NGT  output---if <500cc over the next 12 hours then plan to remove NGT  Expectant management for ileus      Encourage IS/Pulmonary Toilet/SCDs

## 2020-03-24 NOTE — ASSESSMENT & PLAN NOTE
Likely sepsis associated  3/24/2020 slight improvement, transitioned famotidine to pantoprazole  Trend

## 2020-03-24 NOTE — DIETARY
Nutrition services: Day 3 of admit.  81 yo female admitted with septic shock, pneumatosis intestinalis.  Follow up for adequacy of nutrition.    Evaluation:  1. History of stage II rectal cancer  2. perforated small bowel with exploratory laparotomy, lysis of adhesion, repair of perforation on 3/21. Day 3 post op.  3. Increased nutritional needs for surgical healing as well as advanced age.  4. NGT with 300 ml output over past 12 hours. Per surgeon note if less than 500 ml output over next 12 hours NGT to be removed.   5. Pt would benefit from starting nutrition as appropriate.     Malnutrition risk:    Recommendations/Plan:  1. Po diet when appropriate  2. When diet starts consider adding supplements  3. encourage intake of meals and supplements  4. document intake of all meals and supplements as % taken in ADL's to provide interdisciplinary communication across all shifts   5. monitor daily weights  6. Nutrition rep will continue to see patient for ongoing meal and snack preferences when diet begins

## 2020-03-24 NOTE — THERAPY
"Physical Therapy Evaluation completed.   Bed Mobility:  Supine to Sit: (pt in recliner)  Transfers: Sit to Stand: Moderate Assist  Gait: Level Of Assist: Minimal Assist 75ft with Front-Wheel Walker       Plan of Care: Will benefit from Physical Therapy 4 times per week  Discharge Recommendations: Equipment: Will Continue to Assess for Equipment Needs.   Post-acute therapy: Currently anticipating discharge to a transitional care facility for continued skilled therapy services.    Assessment: Pt is an 80 year old admitted 3/21/20 with septic shock from perforated small bowel in the setting of neoadjuvant chemotherapy and radiation for stage 2a adenocarcinoma of the rectum. Pt previously independent living in 2SH alone. Pt stated she must be able to negotiate FOS at discharge to get to bedroom. 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 Pt drives and was not using AD at baseline. Pt found in recliner agreeable to work with therapy. Pt with notable edema in b/l LE. Pt educated on ankle pumps throughout the day to decrease edema. Pt required mod assist x2 for STS and verbal cues for positioning of UE. Pt initially presenting with posterior lean but was able to correct with tactile cues. Pt ambulated 75ft with FWW and Min assist. Pt ambulated with short step length, shuffled gait, and decreased velocity. Pt ambulated on 2L sup O2 and was tachy throughout ambulating (130s+). Pt was able to recover with seated rest. Pts daughter is a respiratory therapist at [facility]. Pt left in recliner with RN present. PT will continue to follow pt to address deficits in functional mobility. Recommend post-acute placement for continued physical therapy services prior to discharge home.         See \"Rehab Therapy-Acute\" Patient Summary Report for complete documentation.     Zulma Vega PT  Pager [phone]  "

## 2020-03-25 PROBLEM — E87.0 HYPERNATREMIA: Status: ACTIVE | Noted: 2020-01-01

## 2020-03-25 NOTE — PROGRESS NOTES
2 RN skin check performed with Alyssa HOLLAND RN    - Open wounds with red excoriated bed noted to sacrum and coccyx.  Per Wound Care RN, cleaned with Normal Saline and new petroleum gauze and mepilex placed.    - Scab to bottom lip appears to have approximated edges.  Neosporin applied.  -  Skin tears noted to bilateral breasts.  Visualized and left open to air.   -  Bunions noted on bilateral feet.  Both are red but do lizet.    -  Bruising  And skin discoloration noted to all extremities     -  Posterior head free of any open wounds or sores

## 2020-03-25 NOTE — PROGRESS NOTES
Trauma / Surgical Daily Progress Note    Date of Service  3/25/2020    Chief Complaint  80 y.o. female admitted 3/21/2020 with Septic shock from perforated small bowel in the setting of neoadjuvant chemotherapy and radiation for Stage II rectal cancer.    Interval Events  NG output too high yesterday for safe removal. Volumes decreased overnight, only 250mLs past 12 hours. No BM or flatus. Patient denies feeling bloated.   Aggressive pulmonary toilet, weak cough but unproductive.  Walked yesterday, overall quite debilitated.   UOP and vital signs appropriate  Fewer episodes of bradycardia past 24 hours    Review of Systems  Review of Systems       Vital Signs for last 24 hours  Pulse:  [] 104  Resp:  [16-47] 27  BP: ()/(49-85) 137/85  SpO2:  [90 %-100 %] 96 %    Hemodynamic parameters for last 24 hours       Respiratory Data     Respiration: (!) 27, Pulse Oximetry: 96 %     Work Of Breathing / Effort: Mild  RUL Breath Sounds: Clear, RML Breath Sounds: Diminished, RLL Breath Sounds: Diminished, DAVON Breath Sounds: Clear, LLL Breath Sounds: Diminished    Physical Exam  Physical Exam  Vitals signs and nursing note reviewed.   Constitutional:       Comments: Pleasant, NAD   HENT:      Head: Normocephalic and atraumatic.      Right Ear: External ear normal.      Left Ear: External ear normal.      Nose:      Comments: NG in place     Mouth/Throat:      Mouth: Mucous membranes are dry.   Eyes:      Extraocular Movements: Extraocular movements intact.      Pupils: Pupils are equal, round, and reactive to light.   Neck:      Musculoskeletal: Normal range of motion and neck supple.   Cardiovascular:      Rate and Rhythm: Normal rate and regular rhythm.      Pulses: Normal pulses.      Heart sounds: Normal heart sounds.   Pulmonary:      Comments: Weak cough, lungs clear  Abdominal:      Comments: Incision c/d/i with staples   Genitourinary:     Comments: De La Garza in place  Musculoskeletal:         General: Swelling  present. No tenderness.   Skin:     General: Skin is warm and dry.   Neurological:      General: No focal deficit present.   Psychiatric:         Mood and Affect: Mood normal.         Behavior: Behavior normal.         Laboratory  Recent Results (from the past 24 hour(s))   POTASSIUM SERUM (K)    Collection Time: 03/24/20  6:05 PM   Result Value Ref Range    Potassium 3.9 3.6 - 5.5 mmol/L   POTASSIUM SERUM (K)    Collection Time: 03/25/20 12:01 AM   Result Value Ref Range    Potassium 3.8 3.6 - 5.5 mmol/L   CBC WITH DIFFERENTIAL    Collection Time: 03/25/20  6:15 AM   Result Value Ref Range    WBC 4.4 (L) 4.8 - 10.8 K/uL    RBC 2.65 (L) 4.20 - 5.40 M/uL    Hemoglobin 8.1 (L) 12.0 - 16.0 g/dL    Hematocrit 27.2 (L) 37.0 - 47.0 %    .6 (H) 81.4 - 97.8 fL    MCH 30.6 27.0 - 33.0 pg    MCHC 29.8 (L) 33.6 - 35.0 g/dL    RDW 68.0 (H) 35.9 - 50.0 fL    Platelet Count 76 (L) 164 - 446 K/uL    MPV 11.6 9.0 - 12.9 fL    Neutrophils-Polys 95.70 (H) 44.00 - 72.00 %    Lymphocytes 2.60 (L) 22.00 - 41.00 %    Monocytes 1.70 0.00 - 13.40 %    Eosinophils 0.00 0.00 - 6.90 %    Basophils 0.00 0.00 - 1.80 %    Nucleated RBC 0.50 /100 WBC    Neutrophils (Absolute) 4.21 2.00 - 7.15 K/uL    Lymphs (Absolute) 0.11 (L) 1.00 - 4.80 K/uL    Monos (Absolute) 0.07 0.00 - 0.85 K/uL    Eos (Absolute) 0.00 0.00 - 0.51 K/uL    Baso (Absolute) 0.00 0.00 - 0.12 K/uL    NRBC (Absolute) 0.02 K/uL    Anisocytosis 1+     Microcytosis 1+    Comp Metabolic Panel    Collection Time: 03/25/20  6:15 AM   Result Value Ref Range    Sodium 160 (HH) 135 - 145 mmol/L    Potassium 3.9 3.6 - 5.5 mmol/L    Chloride 119 (H) 96 - 112 mmol/L    Co2 30 20 - 33 mmol/L    Anion Gap 11.0 7.0 - 16.0    Glucose 98 65 - 99 mg/dL    Bun 29 (H) 8 - 22 mg/dL    Creatinine 0.69 0.50 - 1.40 mg/dL    Calcium 7.2 (L) 8.5 - 10.5 mg/dL    AST(SGOT) 14 12 - 45 U/L    ALT(SGPT) 13 2 - 50 U/L    Alkaline Phosphatase 85 30 - 99 U/L    Total Bilirubin 0.5 0.1 - 1.5 mg/dL    Albumin  1.6 (L) 3.2 - 4.9 g/dL    Total Protein 4.2 (L) 6.0 - 8.2 g/dL    Globulin 2.6 1.9 - 3.5 g/dL    A-G Ratio 0.6 g/dL   MAGNESIUM    Collection Time: 03/25/20  6:15 AM   Result Value Ref Range    Magnesium 2.1 1.5 - 2.5 mg/dL   PHOSPHORUS    Collection Time: 03/25/20  6:15 AM   Result Value Ref Range    Phosphorus 3.2 2.5 - 4.5 mg/dL   DIFFERENTIAL MANUAL    Collection Time: 03/25/20  6:15 AM   Result Value Ref Range    Manual Diff Status PERFORMED    PERIPHERAL SMEAR REVIEW    Collection Time: 03/25/20  6:15 AM   Result Value Ref Range    Peripheral Smear Review see below    PLATELET ESTIMATE    Collection Time: 03/25/20  6:15 AM   Result Value Ref Range    Plt Estimation Decreased    MORPHOLOGY    Collection Time: 03/25/20  6:15 AM   Result Value Ref Range    RBC Morphology Present     Poikilocytosis 1+     Ovalocytes 1+     Toxic Gran Marked    ESTIMATED GFR    Collection Time: 03/25/20  6:15 AM   Result Value Ref Range    GFR If African American >60 >60 mL/min/1.73 m 2    GFR If Non African American >60 >60 mL/min/1.73 m 2   BASIC METABOLIC PANEL    Collection Time: 03/25/20 11:15 AM   Result Value Ref Range    Sodium 159 (HH) 135 - 145 mmol/L    Potassium 4.0 3.6 - 5.5 mmol/L    Chloride 118 (H) 96 - 112 mmol/L    Co2 29 20 - 33 mmol/L    Glucose 110 (H) 65 - 99 mg/dL    Bun 30 (H) 8 - 22 mg/dL    Creatinine 0.74 0.50 - 1.40 mg/dL    Calcium 7.1 (L) 8.5 - 10.5 mg/dL    Anion Gap 12.0 7.0 - 16.0   ESTIMATED GFR    Collection Time: 03/25/20 11:15 AM   Result Value Ref Range    GFR If African American >60 >60 mL/min/1.73 m 2    GFR If Non African American >60 >60 mL/min/1.73 m 2       Fluids    Intake/Output Summary (Last 24 hours) at 3/25/2020 1226  Last data filed at 3/25/2020 0800  Gross per 24 hour   Intake 1160 ml   Output 1715 ml   Net -555 ml       Core Measures & Quality Metrics  Labs reviewed, Medications reviewed and Radiology images reviewed  De La Garza catheter: Critically Ill - Requiring Accurate Measurement  of Urinary Output  Central line in place: Need for access    DVT Prophylaxis: Enoxaparin (Lovenox)  DVT prophylaxis - mechanical: SCDs  Ulcer prophylaxis: No  Antibiotics: Treating active infection/contamination beyond 24 hours perioperative coverage      NORBERT Score    ETOH Screening      Assessment/Plan  * Septic shock (HCC)  Assessment & Plan  Ongoing resuscitation with crystalloid and blood products as needed  On levophed and vasopressin ggt-weaning drips 3/23  3/25 Zosyn day #5 of 7 (7 day course given her significant immunosuppression)      Hypernatremia  Assessment & Plan  3/25/2020 serum sodium up to 160. Unable to give enteral water. D5W started IV with q6 hour BMPs, monitor closely.     Bacteremia due to Escherichia coli  Assessment & Plan  3/22 Blood cultures x 2 and urine culture positive for E. Coli  Appropriate antibiotics in place    Respiratory failure following trauma and surgery (HCC)  Assessment & Plan  Remained on the ventilator post operation  3/22 extubated  3/23 weak cough  3/24 remains high risk of decompensation and need for reintubation    Perforation of intestine (HCC)  Assessment & Plan  Bowel obstruction with perforation  Repaired/released in OR  Dr. Interiano    Rectal cancer (HCC)- (present on admission)  Assessment & Plan  Receiving xrt and chemotherapy (Xeloda) up until hospital admission for SBO  Dr. Chappell, radiation oncology.  Dr. Haque, medical oncology.    Thrombocytopenia (HCC)  Assessment & Plan  Likely sepsis associated  3/24/2020 slight improvement, transitioned famotidine to pantoprazole  Trend    Hypokalemia- (present on admission)  Assessment & Plan  Recurrent hypokalemia, frequent PVC and PACs  Replete and trend with goal serum potassium above 4 and ensuring adequate serum magnesium levels    Acute kidney injury (HCC)  Assessment & Plan  Initial creatinine 1.98  Improving with resuscitation  Follow parameters and avoid nephrotoxins  Plan:  Will consult with Dr. Interiano but I  anticipate removing her NG tube today and starting some clear liquids, preferably some Boost Breeze since it has better nutrition than broth. The patient has not eaten much for 10-14 days and is at high risk for refeeding. Already monitoring electrolytes every 6 hours but will follow her serum phos carefully the next few days as she starts to eat again.   Aggressive pulmonary toilet, PEP, IS, ambulate, PT -  with high risk of deterioration overall.   Starting D5W for critical hypernatremia, serial BMPs.   Family updated on rounds.    Full code.     Discussed patient condition with RN, RT and Pharmacy.  The patient is/remains critically ill with respiratory failure, perforated viscus, bacteremia, cancer, immunosuppression, and critical hypernatremia. Patient is at high risk for decompensation with the threat of imminent deterioration, life threatening deterioration, and loss of vital organ function.    I provided the following critical care service: management of above, high risk medication management    Critical care time spent exclusive of procedures: 42 minutes.    Alfredo Seymour MD  [phone]

## 2020-03-25 NOTE — PROGRESS NOTES
Progress Note    Author:  Pamela Interiano MD    Date & Time:   3/25/2020   12:28 PM          Patient ID:             Name:             Elle Barksdale   YOB: 1940  Age:                 80 y.o.  female   MRN:               [id number]    ________________________________________________________________________      Interval Events:       Patient denies abdominal pain  No flatus or bm    NGT 250cc/12 hours    Ambulating with assistance    .       Exam:       Vitals:    03/25/20 1100   BP: 137/85   Pulse: (!) 104   Resp: (!) 27   Temp:    SpO2: 96%     SpO2  Min: 90 %  Max: 100 %  O2 (LPM)  Min: 2  Max: 3  FiO2%  Min: 30 %  Max: 50 %  Arterial MAP   Min: 53 mmHg  Max: 91 mmHg  Temp  Min: 34.9 °C (94.8 °F)  Max: 36.7 °C (98.1 °F)    Intake/Output Summary (Last 24 hours) at 3/25/2020 1228  Last data filed at 3/25/2020 0800  Gross per 24 hour   Intake 1160 ml   Output 1715 ml   Net -555 ml       DIET ORDERS (From admission to next 24h)     Start     Ordered    03/21/20 1259  Diet NPO  ALL MEALS     Question:  Restrict to:  Answer:  Strict    03/21/20 1300                    Physical Exam  Nursing note reviewed.   Constitutional:       Appearance: Normal appearance. Alert, oriented x 4.NAD  HENT:      Head: Normocephalic and atraumatic.      Mouth/Throat:      Mouth: Mucous membranes are dry  Eyes:      Pupils: Pupils are equal, round, and reactive to light.      No scleral Icterus present  Cardiovascular:      Rate and Rhythm: Normal rate and regular rhythm. Extremities warm, 2+ edema BUE/BLE    Pulmonary:      Effort: Pulmonary effort is normal.      Breath sounds: No wheezes or stridor  Abdominal:      General: Abdomen is flat. Soft, Mild TTP around incision          Recent Labs     03/23/20  0411  03/24/20  0520  03/25/20  0001 03/25/20  0615 03/25/20  1115   SODIUM 152*  --  155*  --   --  160* 159*   POTASSIUM 3.1*   < > 3.8   < > 3.8 3.9 4.0   CHLORIDE 108  --  115*  --   --  119* 118*   CO2 31  --  30   --   --  30 29   BUN 42*  --  32*  --   --  29* 30*   CREATININE 0.77  --  0.59  --   --  0.69 0.74   MAGNESIUM 1.8  --  2.2  --   --  2.1  --    PHOSPHORUS 3.0  --  2.7  --   --  3.2  --    CALCIUM 7.1*  --  7.1*  --   --  7.2* 7.1*    < > = values in this interval not displayed.       Recent Labs     03/23/20 0411 03/24/20 0520 03/25/20 0615 03/25/20  1115   ALTSGPT 15 14 13  --    ASTSGOT 16 17 14  --    ALKPHOSPHAT 79 91 85  --    TBILIRUBIN 0.5 0.5 0.5  --    GLUCOSE 119* 94 98 110*       Recent Labs     03/23/20 0411 03/24/20 0520 03/25/20  0615   RBC 2.29* 2.53* 2.65*   HEMOGLOBIN 7.1* 7.8* 8.1*   HEMATOCRIT 22.5* 25.6* 27.2*   PLATELETCT 70* 81* 76*       Recent Labs     03/23/20 0411 03/24/20 0520 03/25/20  0615   WBC 2.8* 2.7* 4.4*   NEUTSPOLYS 93.50* 79.00* 95.70*   LYMPHOCYTES 1.10* 2.00* 2.60*   MONOCYTES 2.70 4.00 1.70   EOSINOPHILS 0.60 0.00 0.00   BASOPHILS 0.00 0.00 0.00   ASTSGOT 16 17 14   ALTSGPT 15 14 13   ALKPHOSPHAT 79 91 85   TBILIRUBIN 0.5 0.5 0.5         ________________________________________________________________________      Patient Active Problem List   Diagnosis   • Osteoarthritis   • Blood per rectum   • Rectal cancer (HCC)   • Septic shock (HCC)   • Perforation of intestine (HCC)   • Acute kidney injury (HCC)   • Hypokalemia   • Respiratory failure following trauma and surgery (HCC)   • Bacteremia due to Escherichia coli   • Thrombocytopenia (HCC)   • Hypernatremia       Plan:  D/c NGT  Will start sips of clears later today  D/michael wheatley

## 2020-03-25 NOTE — PROGRESS NOTES
"Full bed bath completed in chair.    Patient stood with assist x2.  Patient walked about 20 feet  (pushing wheelchair).  Once she was at the foot of the bed, patient states \"I'm done.\"  Encouraged patient to walk back to the bed.  Patient's knees buckled.  RN and CNA assisted patient to a kneeled position (controlled). Patient then lifted into bed with max assist.   Patient sleepy, denies pain. HR remained .    Dr. Interiano at bedside to assess patient. NGT removed per MD order.     "

## 2020-03-25 NOTE — PROGRESS NOTES
from Lab called with critical result of Na 160 at 0735. Critical lab result read back to .   Dr. Seymour notified of critical lab result at 0810.  Critical lab result read back by Dr. Seymour.

## 2020-03-25 NOTE — PROGRESS NOTES
2 RN skin check performed with Shimon LOWRY RN     - Excoriated Wounds noted to sacrum and coccyx Mepilex nplace   - Scab to bottom lip- defined edges.  Lip [place] applied.  -  Skin tears noted to bilateral folds of breasts- JONATHAN   -  Callouses noted on bilateral feet; blanching    -  Bruising noted to all extremities  - Midline surgical incision with surgical dressing in place; scant drainage

## 2020-03-25 NOTE — ASSESSMENT & PLAN NOTE
3/25 serum sodium up to 160. Unable to give enteral water. D5W started IV with q6 hour BMPs, monitor closely.   3/26 continuing D5W drip and q6 hour BMPs. Continue to trend sodium.   3/27 Cortrak placed - will start small amount of free water.  3/28 Normalizing - wean off D5W and increase enteral free water

## 2020-03-25 NOTE — CARE PLAN
Problem: Pain Management  Goal: Pain level will decrease to patient's comfort goal  Outcome: PROGRESSING AS EXPECTED   Morphine as needed for pain management.     Problem: Infection  Goal: Will remain free from infection  Outcome: PROGRESSING SLOWER THAN EXPECTED  Note: On Antibiotic therapy.  Protective isolation.  Hand washing.

## 2020-03-25 NOTE — PROGRESS NOTES
2 RN skin check with Esau CANDELARIO  -generalized bruising  -Petechiae to charly LE., pitting edema to charly LE  -radiation burn to buttocks/sacrum- red, pink, yellow.  Petroleum gauze and mepilex placed. Blister to L. Butt cheek.  -bloody, dried scab to lower lip, open to air, moisturizer applied  -healing, scabbed scratch marks to charly knees  -charly heels pink, blanching  -charly medial bunions pink, purple, blanching.  Open to air.  -scab to L. Inner nare.  Moisturizer applied.  -redness, skin tear to charly breast folds.  -R. Groin skin fold bleeding, red.  2x2 and tagaderm placed.   -L. Groin skin fold red, skin tear.  -BP cuff moved to left arm  -waffle cushion in place while OOB.   -pillows used to pad bony prominences  -mepilex to charly lower shins/achilles for prevention   -NGT in R. Nare, skin assessed, intact.

## 2020-03-26 PROBLEM — R33.9 URINARY RETENTION: Status: ACTIVE | Noted: 2020-01-01

## 2020-03-26 NOTE — THERAPY
PT treatment attempted. Pt declined to work with PT this AM. Stated she just returned BTB, which RN confirmed. Will reattempt PT tx as able. Thanks    Aylin Kendrick, PT, DPT Pager: [phone]

## 2020-03-26 NOTE — PROGRESS NOTES
Trauma / Surgical Daily Progress Note    Date of Service  3/26/2020    Chief Complaint  80 y.o. female admitted 3/21/2020 with Septic shock from perforated small bowel in the setting of neoadjuvant chemotherapy and radiation for Stage II rectal cancer.    Interval Events  NG output decreased and NG removed yesterday. Tolerated small amount of clear liquids. No BM or flatus yet.   Aggressive pulmonary toilet, weak cough but unproductive. Walked yesterday, very weak and deconditioned.   UOP and vital signs appropriate      Review of Systems  Review of Systems       Vital Signs for last 24 hours  Temp:  [37.2 °C (99 °F)-38.2 °C (100.7 °F)] 37.4 °C (99.4 °F)  Pulse:  [] 84  Resp:  [10-30] 28  BP: ()/(53-71) 108/55  SpO2:  [91 %-98 %] 91 %    Hemodynamic parameters for last 24 hours       Respiratory Data     Respiration: (!) 28, Pulse Oximetry: 91 %     Work Of Breathing / Effort: Mild  RUL Breath Sounds: Clear, RML Breath Sounds: Diminished, RLL Breath Sounds: Diminished, DAVON Breath Sounds: Clear, LLL Breath Sounds: Diminished    Physical Exam  Physical Exam  Vitals signs and nursing note reviewed.   Constitutional:       Comments: Pleasant, NAD   HENT:      Head: Normocephalic and atraumatic.      Right Ear: External ear normal.      Left Ear: External ear normal.      Nose: Nose normal.      Mouth/Throat:      Mouth: Mucous membranes are dry.   Eyes:      Extraocular Movements: Extraocular movements intact.      Pupils: Pupils are equal, round, and reactive to light.   Neck:      Musculoskeletal: Normal range of motion and neck supple.   Cardiovascular:      Rate and Rhythm: Normal rate and regular rhythm.      Pulses: Normal pulses.      Heart sounds: Normal heart sounds.   Pulmonary:      Comments: Weak cough, lungs clear  Abdominal:      Palpations: Abdomen is soft.      Tenderness: There is no abdominal tenderness.      Comments: Incision c/d/i with staples   Genitourinary:     Comments: Holli in  place  Musculoskeletal:         General: Swelling present. No tenderness.   Skin:     General: Skin is warm and dry.   Neurological:      General: No focal deficit present.   Psychiatric:         Mood and Affect: Mood normal.         Behavior: Behavior normal.         Laboratory  Recent Results (from the past 24 hour(s))   Basic Metabolic Panel    Collection Time: 03/25/20  5:30 PM   Result Value Ref Range    Sodium 156 (HH) 135 - 145 mmol/L    Potassium 4.0 3.6 - 5.5 mmol/L    Chloride 115 (H) 96 - 112 mmol/L    Co2 33 20 - 33 mmol/L    Glucose 143 (H) 65 - 99 mg/dL    Bun 32 (H) 8 - 22 mg/dL    Creatinine 0.82 0.50 - 1.40 mg/dL    Calcium 7.1 (L) 8.5 - 10.5 mg/dL    Anion Gap 8.0 7.0 - 16.0   ESTIMATED GFR    Collection Time: 03/25/20  5:30 PM   Result Value Ref Range    GFR If African American >60 >60 mL/min/1.73 m 2    GFR If Non African American >60 >60 mL/min/1.73 m 2   Basic Metabolic Panel    Collection Time: 03/26/20 12:08 AM   Result Value Ref Range    Sodium 155 (H) 135 - 145 mmol/L    Potassium 3.9 3.6 - 5.5 mmol/L    Chloride 116 (H) 96 - 112 mmol/L    Co2 32 20 - 33 mmol/L    Glucose 157 (H) 65 - 99 mg/dL    Bun 32 (H) 8 - 22 mg/dL    Creatinine 0.81 0.50 - 1.40 mg/dL    Calcium 6.8 (LL) 8.5 - 10.5 mg/dL    Anion Gap 7.0 7.0 - 16.0   ESTIMATED GFR    Collection Time: 03/26/20 12:08 AM   Result Value Ref Range    GFR If African American >60 >60 mL/min/1.73 m 2    GFR If Non African American >60 >60 mL/min/1.73 m 2   CBC WITH DIFFERENTIAL    Collection Time: 03/26/20  5:50 AM   Result Value Ref Range    WBC 3.0 (L) 4.8 - 10.8 K/uL    RBC 2.12 (L) 4.20 - 5.40 M/uL    Hemoglobin 6.8 (L) 12.0 - 16.0 g/dL    Hematocrit 23.0 (L) 37.0 - 47.0 %    .3 (H) 81.4 - 97.8 fL    MCH 31.1 27.0 - 33.0 pg    MCHC 30.1 (L) 33.6 - 35.0 g/dL    RDW 67.1 (H) 35.9 - 50.0 fL    Platelet Count 62 (L) 164 - 446 K/uL    MPV 11.5 9.0 - 12.9 fL    Neutrophils-Polys 93.00 (H) 44.00 - 72.00 %    Lymphocytes 4.40 (L) 22.00 -  41.00 %    Monocytes 0.90 0.00 - 13.40 %    Eosinophils 0.80 0.00 - 6.90 %    Basophils 0.00 0.00 - 1.80 %    Nucleated RBC 0.00 /100 WBC    Neutrophils (Absolute) 2.82 2.00 - 7.15 K/uL    Lymphs (Absolute) 0.13 (L) 1.00 - 4.80 K/uL    Monos (Absolute) 0.03 0.00 - 0.85 K/uL    Eos (Absolute) 0.02 0.00 - 0.51 K/uL    Baso (Absolute) 0.00 0.00 - 0.12 K/uL    NRBC (Absolute) 0.00 K/uL    Hypochromia 1+     Anisocytosis 1+     Macrocytosis 1+     Microcytosis 1+    Comp Metabolic Panel    Collection Time: 03/26/20  5:50 AM   Result Value Ref Range    Sodium 157 (HH) 135 - 145 mmol/L    Potassium 3.5 (L) 3.6 - 5.5 mmol/L    Chloride 119 (H) 96 - 112 mmol/L    Co2 30 20 - 33 mmol/L    Anion Gap 8.0 7.0 - 16.0    Glucose 156 (H) 65 - 99 mg/dL    Bun 29 (H) 8 - 22 mg/dL    Creatinine 0.76 0.50 - 1.40 mg/dL    Calcium 6.8 (LL) 8.5 - 10.5 mg/dL    AST(SGOT) 13 12 - 45 U/L    ALT(SGPT) 13 2 - 50 U/L    Alkaline Phosphatase 78 30 - 99 U/L    Total Bilirubin 0.4 0.1 - 1.5 mg/dL    Albumin 1.4 (L) 3.2 - 4.9 g/dL    Total Protein 3.9 (L) 6.0 - 8.2 g/dL    Globulin see below 1.9 - 3.5 g/dL    A-G Ratio see below g/dL   MAGNESIUM    Collection Time: 03/26/20  5:50 AM   Result Value Ref Range    Magnesium 2.1 1.5 - 2.5 mg/dL   PHOSPHORUS    Collection Time: 03/26/20  5:50 AM   Result Value Ref Range    Phosphorus 2.5 2.5 - 4.5 mg/dL   ESTIMATED GFR    Collection Time: 03/26/20  5:50 AM   Result Value Ref Range    GFR If African American >60 >60 mL/min/1.73 m 2    GFR If Non African American >60 >60 mL/min/1.73 m 2   DIFFERENTIAL MANUAL    Collection Time: 03/26/20  5:50 AM   Result Value Ref Range    Bands-Stabs 0.90 0.00 - 10.00 %    Manual Diff Status PERFORMED    PERIPHERAL SMEAR REVIEW    Collection Time: 03/26/20  5:50 AM   Result Value Ref Range    Peripheral Smear Review see below    PLATELET ESTIMATE    Collection Time: 03/26/20  5:50 AM   Result Value Ref Range    Plt Estimation Decreased    MORPHOLOGY    Collection Time:  03/26/20  5:50 AM   Result Value Ref Range    RBC Morphology Present     Poikilocytosis 1+     Ovalocytes 2+     Toxic Gran Moderate        Fluids    Intake/Output Summary (Last 24 hours) at 3/26/2020 1240  Last data filed at 3/26/2020 1200  Gross per 24 hour   Intake 1648.9 ml   Output 805 ml   Net 843.9 ml       Core Measures & Quality Metrics  Labs reviewed, Medications reviewed and Radiology images reviewed  De La Garza catheter: Critically Ill - Requiring Accurate Measurement of Urinary Output  Central line in place: Need for access    DVT Prophylaxis: Enoxaparin (Lovenox)  DVT prophylaxis - mechanical: SCDs  Ulcer prophylaxis: No  Antibiotics: Treating active infection/contamination beyond 24 hours perioperative coverage      NORBERT Score    ETOH Screening      Assessment/Plan  * Septic shock (HCC)  Assessment & Plan  Initially required resuscitation with crystalloid and vasopressors, weaned to off by 3/23 evening.  On levophed and vasopressin ggt-weaning drips 3/23  3/26 Zosyn day #6 of 7 (7 day course given her significant immunosuppression)      Hypernatremia  Assessment & Plan  3/25/2020 serum sodium up to 160. Unable to give enteral water. D5W started IV with q6 hour BMPs, monitor closely.   3/26/2020 continuing D5W drip and q6 hour BMPs. Trend sodium.     Bacteremia due to Escherichia coli  Assessment & Plan  3/22 Blood cultures x 2 and urine culture positive for E. Coli  Appropriate antibiotics in place    Respiratory failure following trauma and surgery ([facility])  Assessment & Plan  Remained on the ventilator post operation  3/22 extubated  3/23 weak cough  3/24 - 3/26 remains high risk of decompensation and need for reintubation    Perforation of intestine ([facility])  Assessment & Plan  Bowel obstruction with perforation  3/21 ex lap, JENNIFER, primary repair of jejunal perforation and stricturoplasty  Dr. Interiano, general surgery    Rectal cancer ([facility])- (present on admission)  Assessment & Plan  Receiving xrt and  chemotherapy (Xeloda) up until hospital admission for SBO  Dr. Chappell, radiation oncology.  Dr. Haque, medical oncology.    Urinary retention  Assessment & Plan  Wheatley removed 3/25 followed by multiple bouts of retention and incontinence with lower abdominal pain.  3/26/2020 wheatley catheter replaced    Thrombocytopenia (HCC)  Assessment & Plan  Likely sepsis associated  3/24/2020 slight improvement, transitioned famotidine to pantoprazole  Trend    Hypokalemia- (present on admission)  Assessment & Plan  Recurrent hypokalemia, frequent PVC and PACs  Replete and trend with goal serum potassium above 4 and ensuring adequate serum magnesium levels    Acute kidney injury (HCC)  Assessment & Plan  Initial creatinine 1.98  Improving with resuscitation  Follow parameters and avoid nephrotoxins  Plan:  Slow advancement of diet, await resolution of ileus. Speech has seen her and cleared her for a more liberal diet but we will keep her on low volume, high protein liquids until her ileus resolves further.   Had to replace her wheatley for retention and to follow her UOP in this critical period in the setting of critical hypernatremia and resuscitation.  Aggressive pulmonary toilet, PEP, IS, ambulate, PT -  with high risk of deterioration overall.    D5W for critical hypernatremia, serial BMPs and rechecking her CBC as all the numbers are considerably different than previous days.   Family updated on rounds.    Full code.     Discussed patient condition with RN, RT and Pharmacy.  The patient is/remains critically ill with respiratory failure, perforated viscus, bacteremia, cancer, immunosuppression, and critical hypernatremia. Patient is at high risk for decompensation with the threat of imminent deterioration, life threatening deterioration, and loss of vital organ function.    I provided the following critical care service: management of above, high risk medication management    Critical care time spent exclusive of procedures: 40  minutes.    Alfredo Seymour MD  [phone]

## 2020-03-26 NOTE — ASSESSMENT & PLAN NOTE
Wheatley removed 3/25 followed by multiple bouts of retention and incontinence with lower abdominal pain.  3/26  wheatley catheter replaced

## 2020-03-26 NOTE — THERAPY
"Speech Language Therapy Clinical Swallow Evaluation completed.    Functional Status: Patient was seen on this date for a clinical swallow evaluation. Daughter who is an RT at [facility] was at bedside for session. She reported pt unable to eat for ~1 month suspected from chemoradiation. Patient sleeping but awoke easily with verbal cue x1. However, as session progressed had reduced JENNIFER and required intermittent cueing to maintain wakefulness. Oral motor examination unremarkable for patient's stated age. She is edentulous with upper and lower dentures in place. Cough was weak. PO trials were administered and consisted of ice chips x4, 4 oz MTL (tsp/cup), 2 oz thin liquids (tsp/cup), and 1 oz puree (tsp). Onset of swallow was min-moderately delayed and laryngeal elevation reduced to palpation. Patient presented with s/sx concerning for aspiration with all consistencies tested. With thin liquids, patient had immediate coughing/choking response on first cup sip of thin liquids with patient stating, \"I just choked on that.\" Initially, patient appeared to tolerate MTL but after ~2oz patient had consistent delayed cough with use of yankauer to clear coughed up phlegm and/or bolus. Patient required 2-3 mildly effortful swallows to clear single bolus concerning for pharyngeal residue and intermittent belching concerning for esophageal dysphagia. Education provided to patient and daughter.     Recommendations - Diet: Patient remains at high risk for aspiration given generalized weakness and reduced JENNIFER (per RN was given pain meds recently). Collaborated with RN and MD - patient to remain NPO with reassessment tomorrow AM. Patient may benefit from an alternative source of nutrition if swallow function and JENNIFER do not improve. OK for patient to have a few single ice chips an hour with RN only to minimize xerostomia.                              Strategies: To be determined                             Medication Administration: Defer " "to MD    Plan of Care: Will benefit from Speech Therapy 3 times per week    Post-Acute Therapy: Recommend inpatient transitional care services for continued speech therapy services.      See \"Rehab Therapy-Acute\" Patient Summary Report for complete documentation. Thank you for the consult.           "

## 2020-03-26 NOTE — CARE PLAN
Problem: Communication  Goal: The ability to communicate needs accurately and effectively will improve  Outcome: PROGRESSING AS EXPECTED  Intervention: [place] patient and significant other/support system to call light to alert staff of needs  Note: Although intermittently confused and slightly delirious, patient answers most orientation questions correct and communicates effectively with staff. Patient is pleasant and cooperative. She verbalizes understanding of call light but has yet to use the call light. Hourly rounding performed.      Problem: Bowel/Gastric:  Goal: Normal bowel function is maintained or improved  Outcome: PROGRESSING AS EXPECTED  Intervention: Educate patient and significant other/support system about diet, fluid intake, medications and activity to promote bowel function  Note: Patient's NGT remove 3/25 and placed on clear liquid diet. Patient has no complaints of nausea and has tolerated clear liquids fine with a good appetite. NO BM yet. Will continue to monitor  status.

## 2020-03-26 NOTE — PROGRESS NOTES
from Lab called with critical result of Na 156 at 1813. Critical lab result read back to .   This critical lab result is within parameters established by  (improved from prior draw) for this patient

## 2020-03-26 NOTE — PROGRESS NOTES
Progress Note    Author:  Pamela Interiano MD    Date & Time:   3/26/2020   4:12 PM          Patient ID:             Name:             Elle Barksdale   YOB: 1940  Age:                 80 y.o.  female   MRN:               [id number]    ________________________________________________________________________      Interval Events:       De La Garza replaced for urinary retention  Swallow eval pending    Patient resting comfortably  Denies abdominal pain         Exam:       Vitals:    03/26/20 1500   BP: 108/55   Pulse: 88   Resp: 16   Temp:    SpO2: 98%     SpO2  Min: 90 %  Max: 100 %  O2 (LPM)  Min: 1  Max: 4  FiO2%  Min: 30 %  Max: 50 %  Arterial MAP   Min: 53 mmHg  Max: 91 mmHg  Temp  Min: 34.9 °C (94.8 °F)  Max: 38.2 °C (100.7 °F)    Intake/Output Summary (Last 24 hours) at 3/26/2020 1612  Last data filed at 3/26/2020 1600  Gross per 24 hour   Intake 1823.9 ml   Output 805 ml   Net 1018.9 ml       DIET ORDERS (From admission to next 24h)     Start     Ordered    03/26/20 1432  Diet NPO  ALL MEALS     Question:  Restrict to:  Answer:  Ice Chips  Comment:  single ice chips with RN only    03/26/20 1432    03/25/20 1855  Supplements  ALL MEALS     Question:  Which Supplement  Answer:  RESOURCE BREEZE JUICE    03/25/20 1854                    Physical Exam  Nursing note reviewed.   Constitutional:       Appearance: Normal appearance. Alert, oriented x 3  HENT:      Head: Normocephalic and atraumatic.      Mouth/Throat:      Mouth: Mucous membranes are dry  Eyes:      Pupils: Pupils are equal, round, and reactive to light.      No scleral Icterus present  Cardiovascular:      Rate and Rhythm: Normal rate and regular rhythm. Extremities warm, 2+ BLE edema  Pulmonary:      Effort: Pulmonary effort is normal.      Breath sounds:No wheezes or stridor  Abdominal:      General: Abdomen is soft, mildly distended. TTP Right mid abdomen, no rebound or guarding. Incision c/d/i, no erythema      Recent Labs      03/24/20  0520  03/25/20  0615  03/26/20  0008 03/26/20  0550 03/26/20  1155   SODIUM 155*  --  160*   < > 155* 157* 157*   POTASSIUM 3.8   < > 3.9   < > 3.9 3.5* 3.5*   CHLORIDE 115*  --  119*   < > 116* 119* 119*   CO2 30  --  30   < > 32 30 31   BUN 32*  --  29*   < > 32* 29* 28*   CREATININE 0.59  --  0.69   < > 0.81 0.76 0.74   MAGNESIUM 2.2  --  2.1  --   --  2.1  --    PHOSPHORUS 2.7  --  3.2  --   --  2.5  --    CALCIUM 7.1*  --  7.2*   < > 6.8* 6.8* 6.9*    < > = values in this interval not displayed.       Recent Labs     03/24/20 0520 03/25/20 0615  03/26/20  0008 03/26/20  0550 03/26/20  1155   ALTSGPT 14 13  --   --  13  --    ASTSGOT 17 14  --   --  13  --    ALKPHOSPHAT 91 85  --   --  78  --    TBILIRUBIN 0.5 0.5  --   --  0.4  --    GLUCOSE 94 98   < > 157* 156* 178*    < > = values in this interval not displayed.       Recent Labs     03/25/20 0615 03/26/20  0550 03/26/20  1155   RBC 2.65* 2.12* 2.24*   HEMOGLOBIN 8.1* 6.8* 7.0*   HEMATOCRIT 27.2* 23.0* 23.4*   PLATELETCT 76* 62* 63*       Recent Labs     03/24/20  0520 03/25/20  0615 03/26/20  0550 03/26/20  1155   WBC 2.7* 4.4* 3.0* 3.0*   NEUTSPOLYS 79.00* 95.70* 93.00* 88.60*   LYMPHOCYTES 2.00* 2.60* 4.40* 7.00*   MONOCYTES 4.00 1.70 0.90 0.90   EOSINOPHILS 0.00 0.00 0.80 2.60   BASOPHILS 0.00 0.00 0.00 0.00   ASTSGOT 17 14 13  --    ALTSGPT 14 13 13  --    ALKPHOSPHAT 91 85 78  --    TBILIRUBIN 0.5 0.5 0.4  --          ________________________________________________________________________      Patient Active Problem List   Diagnosis   • Osteoarthritis   • Blood per rectum   • Rectal cancer (HCC)   • Septic shock (HCC)   • Perforation of intestine (HCC)   • Acute kidney injury (HCC)   • Hypokalemia   • Respiratory failure following trauma and surgery (HCC)   • Bacteremia due to Escherichia coli   • Thrombocytopenia (HCC)   • Hypernatremia   • Urinary retention     Patient tolerated removal of NGT  Bowel function slow to  return  Plan:  Agree with starting enteral feeding w/ cortrak if patient is unable to pass swallow eval.        Encourage IS/pulmonary toilet  +SCDs/+lovenox

## 2020-03-26 NOTE — PROGRESS NOTES
Lab called with critical result of Calcium 6.9 and sodium 157 at 1242. Critical lab result read back to .   Dr. Seymour notified of critical lab result at 1243.  Critical lab result read back by Dr. Seymour.

## 2020-03-26 NOTE — CARE PLAN
Problem: Nutritional:  Goal: Achieve adequate nutritional intake  Description: Start PO diet as appropriate  When diet begins patient will consume 50% of meals and supplements   Outcome: PROGRESSING AS EXPECTED     Clear liquids with supplements started

## 2020-03-26 NOTE — CARE PLAN
Problem: Safety  Goal: Will remain free from falls  Outcome: PROGRESSING AS EXPECTED  Note: Use of call light reinforced, bed locked in lowest position, belongings within reach.        Problem: Pain Management  Goal: Pain level will decrease to patient's comfort goal  Outcome: PROGRESSING AS EXPECTED  Note: Pain assessments completed and medication administered per MAR.

## 2020-03-26 NOTE — CARE PLAN
Respiratory Update    Treatment modality: IS/ PEP QID    IS: 1000    Pt tolerating current treatments well with no adverse reactions.

## 2020-03-27 PROBLEM — D64.9 ANEMIA: Status: ACTIVE | Noted: 2020-01-01

## 2020-03-27 PROBLEM — E43 SEVERE PROTEIN-CALORIE MALNUTRITION (HCC): Status: ACTIVE | Noted: 2020-01-01

## 2020-03-27 NOTE — PROGRESS NOTES
Trauma / Surgical Daily Progress Note    Date of Service  3/27/2020    Chief Complaint  80 y.o. female admitted 3/21/2020 with Septic shock (HCC) from perforated small intestine.    Interval Events  Respiratory status remains tenuous. Hemodynamically appropriate. Failed swallow eval - place cortrak and start trickle feeds. Consider reglan. Electrolyte dyscrasias continue - remains on D5W gtt.     Review of Systems  Review of Systems   Unable to perform ROS: Mental status change        Vital Signs for last 24 hours  Pulse:  [64-97] 83  Resp:  [10-28] 12  BP: ()/(51-71) 109/65  SpO2:  [91 %-100 %] 98 %    Hemodynamic parameters for last 24 hours       Respiratory Data     Respiration: 12, Pulse Oximetry: 98 %     Work Of Breathing / Effort: Mild  RUL Breath Sounds: Clear, RML Breath Sounds: Diminished, RLL Breath Sounds: Diminished, DAVON Breath Sounds: Clear, LLL Breath Sounds: Diminished    Physical Exam  Physical Exam  Neck:      Musculoskeletal: Neck supple.   Cardiovascular:      Rate and Rhythm: Normal rate.   Pulmonary:      Effort: Pulmonary effort is normal.   Abdominal:      General: There is no distension.      Palpations: Abdomen is soft.      Tenderness: There is no abdominal tenderness.      Comments: Incision dry   Genitourinary:     Comments: De La Garza to gravity  Skin:     General: Skin is warm.         Laboratory  Recent Results (from the past 24 hour(s))   Basic Metabolic Panel    Collection Time: 03/26/20 11:55 AM   Result Value Ref Range    Sodium 157 (HH) 135 - 145 mmol/L    Potassium 3.5 (L) 3.6 - 5.5 mmol/L    Chloride 119 (H) 96 - 112 mmol/L    Co2 31 20 - 33 mmol/L    Glucose 178 (H) 65 - 99 mg/dL    Bun 28 (H) 8 - 22 mg/dL    Creatinine 0.74 0.50 - 1.40 mg/dL    Calcium 6.9 (LL) 8.5 - 10.5 mg/dL    Anion Gap 7.0 7.0 - 16.0   CBC WITH DIFFERENTIAL    Collection Time: 03/26/20 11:55 AM   Result Value Ref Range    WBC 3.0 (L) 4.8 - 10.8 K/uL    RBC 2.24 (L) 4.20 - 5.40 M/uL    Hemoglobin 7.0  (L) 12.0 - 16.0 g/dL    Hematocrit 23.4 (L) 37.0 - 47.0 %    .5 (H) 81.4 - 97.8 fL    MCH 31.3 27.0 - 33.0 pg    MCHC 29.9 (L) 33.6 - 35.0 g/dL    RDW 67.7 (H) 35.9 - 50.0 fL    Platelet Count 63 (L) 164 - 446 K/uL    MPV 12.1 9.0 - 12.9 fL    Neutrophils-Polys 88.60 (H) 44.00 - 72.00 %    Lymphocytes 7.00 (L) 22.00 - 41.00 %    Monocytes 0.90 0.00 - 13.40 %    Eosinophils 2.60 0.00 - 6.90 %    Basophils 0.00 0.00 - 1.80 %    Nucleated RBC 0.00 /100 WBC    Neutrophils (Absolute) 2.66 2.00 - 7.15 K/uL    Lymphs (Absolute) 0.21 (L) 1.00 - 4.80 K/uL    Monos (Absolute) 0.03 0.00 - 0.85 K/uL    Eos (Absolute) 0.08 0.00 - 0.51 K/uL    Baso (Absolute) 0.00 0.00 - 0.12 K/uL    NRBC (Absolute) 0.00 K/uL    Anisocytosis 1+     Macrocytosis 2+     Microcytosis 1+    ESTIMATED GFR    Collection Time: 03/26/20 11:55 AM   Result Value Ref Range    GFR If African American >60 >60 mL/min/1.73 m 2    GFR If Non African American >60 >60 mL/min/1.73 m 2   PLATELET ESTIMATE    Collection Time: 03/26/20 11:55 AM   Result Value Ref Range    Plt Estimation Decreased    MORPHOLOGY    Collection Time: 03/26/20 11:55 AM   Result Value Ref Range    RBC Morphology Present     Giant Platelets 1+     Polychromia 1+     Poikilocytosis 1+     Ovalocytes 1+     Rouleaux Slight     Reactive Lymphocytes Few     Toxic Gran Slight    PERIPHERAL SMEAR REVIEW    Collection Time: 03/26/20 11:55 AM   Result Value Ref Range    Peripheral Smear Review see below    DIFFERENTIAL MANUAL    Collection Time: 03/26/20 11:55 AM   Result Value Ref Range    Myelocytes 0.90 %    Manual Diff Status PERFORMED    Basic Metabolic Panel    Collection Time: 03/26/20  5:35 PM   Result Value Ref Range    Sodium 158 (HH) 135 - 145 mmol/L    Potassium 3.5 (L) 3.6 - 5.5 mmol/L    Chloride 119 (H) 96 - 112 mmol/L    Co2 31 20 - 33 mmol/L    Glucose 167 (H) 65 - 99 mg/dL    Bun 25 (H) 8 - 22 mg/dL    Creatinine 0.66 0.50 - 1.40 mg/dL    Calcium 6.8 (LL) 8.5 - 10.5 mg/dL     Anion Gap 8.0 7.0 - 16.0   ESTIMATED GFR    Collection Time: 03/26/20  5:35 PM   Result Value Ref Range    GFR If African American >60 >60 mL/min/1.73 m 2    GFR If Non African American >60 >60 mL/min/1.73 m 2   Basic Metabolic Panel    Collection Time: 03/26/20 11:39 PM   Result Value Ref Range    Sodium 158 (HH) 135 - 145 mmol/L    Potassium 3.6 3.6 - 5.5 mmol/L    Chloride 120 (H) 96 - 112 mmol/L    Co2 31 20 - 33 mmol/L    Glucose 156 (H) 65 - 99 mg/dL    Bun 22 8 - 22 mg/dL    Creatinine 0.59 0.50 - 1.40 mg/dL    Calcium 6.7 (LL) 8.5 - 10.5 mg/dL    Anion Gap 7.0 7.0 - 16.0   ESTIMATED GFR    Collection Time: 03/26/20 11:39 PM   Result Value Ref Range    GFR If African American >60 >60 mL/min/1.73 m 2    GFR If Non African American >60 >60 mL/min/1.73 m 2   CBC WITH DIFFERENTIAL    Collection Time: 03/27/20  5:45 AM   Result Value Ref Range    WBC 2.3 (LL) 4.8 - 10.8 K/uL    RBC 2.16 (L) 4.20 - 5.40 M/uL    Hemoglobin 6.7 (L) 12.0 - 16.0 g/dL    Hematocrit 23.1 (L) 37.0 - 47.0 %    .9 (H) 81.4 - 97.8 fL    MCH 31.0 27.0 - 33.0 pg    MCHC 29.0 (L) 33.6 - 35.0 g/dL    RDW 67.0 (H) 35.9 - 50.0 fL    Platelet Count 63 (L) 164 - 446 K/uL    MPV 11.8 9.0 - 12.9 fL    Neutrophils-Polys 93.90 (H) 44.00 - 72.00 %    Lymphocytes 5.20 (L) 22.00 - 41.00 %    Monocytes 0.90 0.00 - 13.40 %    Eosinophils 0.00 0.00 - 6.90 %    Basophils 0.00 0.00 - 1.80 %    Nucleated RBC 0.00 /100 WBC    Neutrophils (Absolute) 2.16 2.00 - 7.15 K/uL    Lymphs (Absolute) 0.12 (L) 1.00 - 4.80 K/uL    Monos (Absolute) 0.02 0.00 - 0.85 K/uL    Eos (Absolute) 0.00 0.00 - 0.51 K/uL    Baso (Absolute) 0.00 0.00 - 0.12 K/uL    NRBC (Absolute) 0.00 K/uL    Hypochromia 2+     Anisocytosis 1+     Macrocytosis 2+     Microcytosis 1+    Comp Metabolic Panel    Collection Time: 03/27/20  5:45 AM   Result Value Ref Range    Sodium 155 (H) 135 - 145 mmol/L    Potassium 3.4 (L) 3.6 - 5.5 mmol/L    Chloride 118 (H) 96 - 112 mmol/L    Co2 29 20 - 33  mmol/L    Anion Gap 8.0 7.0 - 16.0    Glucose 142 (H) 65 - 99 mg/dL    Bun 20 8 - 22 mg/dL    Creatinine 0.55 0.50 - 1.40 mg/dL    Calcium 6.8 (LL) 8.5 - 10.5 mg/dL    AST(SGOT) 13 12 - 45 U/L    ALT(SGPT) 14 2 - 50 U/L    Alkaline Phosphatase 73 30 - 99 U/L    Total Bilirubin 0.4 0.1 - 1.5 mg/dL    Albumin 1.5 (L) 3.2 - 4.9 g/dL    Total Protein 4.0 (L) 6.0 - 8.2 g/dL    Globulin 2.5 1.9 - 3.5 g/dL    A-G Ratio 0.6 g/dL   MAGNESIUM    Collection Time: 03/27/20  5:45 AM   Result Value Ref Range    Magnesium 2.2 1.5 - 2.5 mg/dL   PHOSPHORUS    Collection Time: 03/27/20  5:45 AM   Result Value Ref Range    Phosphorus 1.8 (L) 2.5 - 4.5 mg/dL   DIFFERENTIAL MANUAL    Collection Time: 03/27/20  5:45 AM   Result Value Ref Range    Manual Diff Status PERFORMED    PERIPHERAL SMEAR REVIEW    Collection Time: 03/27/20  5:45 AM   Result Value Ref Range    Peripheral Smear Review see below    PLATELET ESTIMATE    Collection Time: 03/27/20  5:45 AM   Result Value Ref Range    Plt Estimation Decreased    MORPHOLOGY    Collection Time: 03/27/20  5:45 AM   Result Value Ref Range    RBC Morphology Present     Polychromia 1+     Poikilocytosis 2+     Ovalocytes 2+     Toxic Gran Marked    ESTIMATED GFR    Collection Time: 03/27/20  5:45 AM   Result Value Ref Range    GFR If African American >60 >60 mL/min/1.73 m 2    GFR If Non African American >60 >60 mL/min/1.73 m 2       Fluids    Intake/Output Summary (Last 24 hours) at 3/27/2020 1033  Last data filed at 3/27/2020 1000  Gross per 24 hour   Intake 1934.16 ml   Output 1325 ml   Net 609.16 ml       Core Measures & Quality Metrics  Medications reviewed, Labs reviewed and Radiology images reviewed  De La Garza catheter: Critically Ill - Requiring Accurate Measurement of Urinary Output  Central line in place: Need for access    DVT Prophylaxis: Enoxaparin (Lovenox)  DVT prophylaxis - mechanical: SCDs  Ulcer prophylaxis: Yes  Antibiotics: Treating active infection/contamination beyond 24  hours perioperative coverage  Assessed for rehab: Patient was assess for and/or received rehabilitation services during this hospitalization    NORBERT Score  ETOH Screening    Assessment/Plan  * Septic shock (HCC)- (present on admission)  Assessment & Plan  Initially required resuscitation with crystalloid and vasopressors  3/23  Weaned off levophed and vasopressin ggt  3/27 Zosyn day #7 of 7 (7 day course given her significant immunosuppression)      Hypernatremia- (present on admission)  Assessment & Plan  3/25 serum sodium up to 160. Unable to give enteral water. D5W started IV with q6 hour BMPs, monitor closely.   3/26 continuing D5W drip and q6 hour BMPs. Continue to trend sodium.   3/27 Cortrak placed - will start small amount of free water    Bacteremia due to Escherichia coli- (present on admission)  Assessment & Plan  3/21 Blood cultures x 2 and urine culture positive for E. Coli  3/27 Day 6 of 7 zosyn    Respiratory failure following trauma and surgery (HCC)- (present on admission)  Assessment & Plan  Remained on the ventilator post operation  3/22 extubated  3/23 weak cough  Remains high risk of decompensation and need for reintubation    Perforation of intestine (HCC)- (present on admission)  Assessment & Plan  Bowel obstruction with perforation  3/21 ex lap, JENNIFER, primary repair of jejunal perforation and stricturoplasty  Awaiting increasing GI function.  Dr. Interiano, general surgery    Rectal cancer (HCC)- (present on admission)  Assessment & Plan  Receiving xrt and chemotherapy (Xeloda) up until hospital admission for SBO  Dr. Chappell, radiation oncology.  Dr. Matthew, medical oncology.    Severe protein-calorie malnutrition (HCC)- (present on admission)  Assessment & Plan  Albumin low - 1.5  3/27 Failed swallow - Place cortrak, start trickle feeds      Urinary retention- (present on admission)  Assessment & Plan  De La Garza removed 3/25 followed by multiple bouts of retention and incontinence with lower  abdominal pain.  3/26  wheatley catheter replaced    Thrombocytopenia (HCC)- (present on admission)  Assessment & Plan  Likely sepsis associated  3/24/2020 slight improvement, transitioned famotidine to pantoprazole  Trend    Hypokalemia- (present on admission)  Assessment & Plan  Recurrent hypokalemia, frequent PVC and PACs  Replete and trend with goal serum potassium above 4 and ensuring adequate serum magnesium levels    Acute kidney injury (HCC)- (present on admission)  Assessment & Plan  Initial creatinine 1.98  Improving with resuscitation  3/22 normaliezed  Avoid nephrotoxins        Discussed patient condition with RN, RT, Pharmacy,  and Patient.  CRITICAL CARE TIME EXCLUDING PROCEDURES:36   minutes

## 2020-03-27 NOTE — PROGRESS NOTES
Progress Note    Author:  Pamela Interiano MD    Date & Time:   3/27/2020   11:30 AM          Patient ID:             Name:             Elle Barksdale   YOB: 1940  Age:                 80 y.o.  female   MRN:               [id number]    ________________________________________________________________________      Interval Events:       cortrak placed, patient failed swallow eval.  Up to chair  Denies abdominal pain or nausea  Reports small amount flatus           Exam:       Vitals:    03/27/20 1100   BP:    Pulse: 71   Resp: (!) 26   Temp:    SpO2: 98%     SpO2  Min: 90 %  Max: 100 %  O2 (LPM)  Min: 1  Max: 4  FiO2%  Min: 30 %  Max: 50 %  Arterial MAP   Min: 53 mmHg  Max: 91 mmHg  Temp  Min: 34.9 °C (94.8 °F)  Max: 38.2 °C (100.7 °F)    Intake/Output Summary (Last 24 hours) at 3/27/2020 1130  Last data filed at 3/27/2020 1000  Gross per 24 hour   Intake 1934.16 ml   Output 1325 ml   Net 609.16 ml       DIET ORDERS (From admission to next 24h)     Start     Ordered    03/26/20 1432  Diet NPO  ALL MEALS     Question:  Restrict to:  Answer:  Ice Chips  Comment:  single ice chips with RN only    03/26/20 1432    03/25/20 1855  Supplements  ALL MEALS     Question:  Which Supplement  Answer:  RESOURCE BREEZE JUICE    03/25/20 1854                    Physical Exam  Nursing note reviewed.   Constitutional:       Appearance:  Alert, oriented x 3  NAD  HENT:      Head: Normocephalic and atraumatic.      Mouth/Throat:      Mouth: Mucous membranes are dry   Eyes:      Pupils: Pupils are equal, round, and reactive to light.      No scleral Icterus present  Cardiovascular:      Rate and Rhythm: Normal rate and regular rhythm. Extremities warm, 2+ BUE/BLE edema  Pulmonary:      Effort: Pulmonary effort is normal.      Breath sounds:No wheezes or stridor  Abdominal:          Palpations: Abdomen is soft. Mildly distended. Non-tender     Comments:   Neurological:      Mental Status: alert and oriented x 3.            Recent Labs     03/25/20  0615  03/26/20  0550  03/26/20  1735 03/26/20 2339 03/27/20  0545   SODIUM 160*   < > 157*   < > 158* 158* 155*   POTASSIUM 3.9   < > 3.5*   < > 3.5* 3.6 3.4*   CHLORIDE 119*   < > 119*   < > 119* 120* 118*   CO2 30   < > 30   < > 31 31 29   BUN 29*   < > 29*   < > 25* 22 20   CREATININE 0.69   < > 0.76   < > 0.66 0.59 0.55   MAGNESIUM 2.1  --  2.1  --   --   --  2.2   PHOSPHORUS 3.2  --  2.5  --   --   --  1.8*   CALCIUM 7.2*   < > 6.8*   < > 6.8* 6.7* 6.8*    < > = values in this interval not displayed.       Recent Labs     03/25/20  0615  03/26/20  0550  03/26/20 1735 03/26/20 2339 03/27/20  0545   ALTSGPT 13  --  13  --   --   --  14   ASTSGOT 14  --  13  --   --   --  13   ALKPHOSPHAT 85  --  78  --   --   --  73   TBILIRUBIN 0.5  --  0.4  --   --   --  0.4   GLUCOSE 98   < > 156*   < > 167* 156* 142*    < > = values in this interval not displayed.       Recent Labs     03/26/20  0550 03/26/20  1155 03/27/20  0545   RBC 2.12* 2.24* 2.16*   HEMOGLOBIN 6.8* 7.0* 6.7*   HEMATOCRIT 23.0* 23.4* 23.1*   PLATELETCT 62* 63* 63*       Recent Labs     03/25/20  0615 03/26/20  0550 03/26/20  1155 03/27/20  0545   WBC 4.4* 3.0* 3.0* 2.3*   NEUTSPOLYS 95.70* 93.00* 88.60* 93.90*   LYMPHOCYTES 2.60* 4.40* 7.00* 5.20*   MONOCYTES 1.70 0.90 0.90 0.90   EOSINOPHILS 0.00 0.80 2.60 0.00   BASOPHILS 0.00 0.00 0.00 0.00   ASTSGOT 14 13  --  13   ALTSGPT 13 13  --  14   ALKPHOSPHAT 85 78  --  73   TBILIRUBIN 0.5 0.4  --  0.4         ________________________________________________________________________      Patient Active Problem List   Diagnosis   • Osteoarthritis   • Blood per rectum   • Rectal cancer (HCC)   • Septic shock (HCC)   • Perforation of intestine (HCC)   • Acute kidney injury (HCC)   • Hypokalemia   • Respiratory failure following trauma and surgery (HCC)   • Bacteremia due to Escherichia coli   • Thrombocytopenia (HCC)   • Hypernatremia   • Urinary retention   • Severe  protein-calorie malnutrition (HCC)   • Anemia     Pancytopenia: likely chronic--monitor h/h and platelets, remain low but stable  Plan:  Reglan started  Advance tube feeds as tolerated      Encourage IS/Pulmonary toilet    +SCDs/+lovenox/ PT/OT

## 2020-03-27 NOTE — CARE PLAN
Problem: Nutritional:  Goal: Nutrition support tolerated and meeting greater than 85% of estimated needs  Outcome: NOT MET  Tube feeding to start       Problem: Nutritional:  Goal: Achieve adequate nutritional intake  Description: Start PO diet as appropriate  When diet begins patient will consume 50% of meals and supplements   Outcome: MET   Pt now NPO and TF to s tart

## 2020-03-27 NOTE — CARE PLAN
Respiratory Update    Treatment modality: PEP/ IS QID    IS: 1000    Pt tolerating current treatments well with no adverse reactions.

## 2020-03-27 NOTE — THERAPY
"Speech Language Therapy dysphagia treatment completed.     Functional Status: Patient was seen on this date for dysphagia treatment. Patient sitting out of bed in a chair for session, on 1L via NC. Patient initially agreeable to PO trials which consisted of 3 single ice chips and ~3 oz MTL (tsp/cup). Onset of swallow trigger was minimally delayed with some mild effort prior to initiating swallow. She required mod-max encouragement to consume minimal amount of PO trials which is concerning for patient meeting nutritional needs. Although patient did not show any overt s/sx of aspiration she appeared to significantly fatigue following PO. She had some mild increase in WOB and reported not feeling well. She kept eyes closed as session progressed and declined further PO trials. Therefore, session was concluded.     Recommendations: At this time, recommend strong consideration for cortrak placement given generalized weakness, fatigue, and concerns for patient meeting nutritional needs via strict PO diet. OK for patient to have small amount of single ice chips or NTL via tsp throughout at the day. STOP PO with ongoing concerns for aspiration.     Plan of Care: Will benefit from Speech Therapy 3 times per week    Post-Acute Therapy: Recommend inpatient transitional care services for continued speech therapy services.      See \"Rehab Therapy-Acute\" Patient Summary Report for complete documentation.     "

## 2020-03-27 NOTE — PROGRESS NOTES
Roxanne from Lab called with critical result of sodium at 158 and calcium at 6.7 at 0011. Critical lab result read back to Roxanne.   This critical lab result is within parameters established by MD for this patient
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
## 2020-03-27 NOTE — CARE PLAN
Problem: Infection  Goal: Will remain free from infection  Intervention: Assess signs and symptoms of infection  Note: RN monitors Pt VS and lab values to observe for S/S of infection.  Hand hygiene implemented before and after Pt care.       Problem: Pain Management  Goal: Pain level will decrease to patient's comfort goal  Intervention: Follow pain managment plan developed in collaboration with patient and Interdisciplinary Team  Note: Assessing pain level frequently using 0 to 10 scale.  Providing medication per MAR.  Providing non-pharmacological intervention, therapeutic communication.

## 2020-03-27 NOTE — DIETARY
"Nutrition Support Assessment     Nutrition services:   Day 6 of admit.  79 yo female with admitting diagnosis: pneumatosis intestinalis    Current problem list:  1. Septic shock  2. Hypernatremia  3. Bacteremia  4. Respiratory failure - extubated 3/22  5. Perforation of intestine  6. Thrombocytopenia  7. Hypokalemia  8. EMELINA  9. Severe protein-calorie malnutrition  10. History of rectal cancer    Assessment:  Estimated Nutritional Needs: based on: height 5'4\", weight 72.1 kg, IBW 54.432 kg, BMI 28.16    Calculation/Equation MSJ x 1.2 = 1394 kcals  Calories/day: 1300 - 1450 kcals (18 - 19 kcals/kg)  Protein/day: 86 - 100 g (1.2 - 1.4 g/kg)    Evaluation:   1. Severely malnourished pt without nutrition x 5 days having surgery for perforated intestine on 3/21.  2. Cortrak placed for Enteral access with confirmation in second portion of duodenum.  3. Trickle tube feedings to start and advance only with MD orders  4. Scale weight on admit 158# is decreased 12# from 3/1/2019 weight of 170#. This is a 7% wt loss over 1 month.    5. Hypophosphatemia - 1.8. needs replacement.   6. Will provide elemental TF formula for ease of absorption following surgery.     Malnutrition Risk: Severe protein calorie malnutrition as evidenced by 7# wt loss over 1 month and < 50% intake for >/= 5 days.     Recommendations/Plan:  1. Start Impact 1.5 @ 10 ml/hr and advance only with further MD orders  2. Full goal for Impact 1.5 40 ml/hr which will provide 1440 kcals, 90 g protein, 1112 ml H20/day  3. Hypophosphatemia - needs replacement. Discussed with RN.   4. Po diet when appropriate    "

## 2020-03-27 NOTE — THERAPY
"Pt seen for PT treatment session. Pt very lethargic upon PT arrival, had been sitting in bedside chair most of the morning. Pt demonstrated a significant decline in function from inital PT evaluation.  Pt required Mod - Max A to complete mobility today. Demonstrated increased difficulty with sit <> stand. performed sit to stand x3 trials with improving posture and WBing through LE's. Pt required Max A to perform pivot transfer to return to EOB. PT will continue to follow while in house.     Physical Therapy Treatment completed.   Bed Mobility:  Sit to supine: Max A  Transfers: Sit to Stand: Maximal Assist     Plan of Care: Will benefit from Physical Therapy 4 times per week  Discharge Recommendations: Equipment: Will Continue to Assess for Equipment Needs.   Post-acute therapy: Recommend post-acute placement for continued physical therapy services prior to discharge home.          See \"Rehab Therapy-Acute\" Patient Summary Report for complete documentation.       "

## 2020-03-27 NOTE — THERAPY
"Occupational Therapy Evaluation completed.   Functional Status:  Max A UB dressing, Max A (x2 for safety) stand pivot chair > EOB , min A grooming   Plan of Care: Will benefit from Occupational Therapy 4 times per week  Discharge Recommendations:  Equipment: Will Continue to Assess for Equipment Needs. Post-acute therapy Recommend post-acute placement for additional occupational therapy services prior to discharge home.    See \"Rehab Therapy-Acute\" Patient Summary Report for complete documentation.      Pt appears to have had a decrease in function since evaluation. Pt in chair upon arrival and visibly fatigued. Pt required Max A to stand and Max A to pivot from chair to EOB. Once long sitting in bed pt required max A for UB dressing and Min A for seated grooming. Will continue to follow for Acute OT services while in house. Recommend post acute placement.   "

## 2020-03-28 NOTE — PROGRESS NOTES
Trauma / Surgical Daily Progress Note    Date of Service  3/28/2020    Chief Complaint  80 y.o. female admitted 3/21/2020 with Septic shock (HCC)    Interval Events  Medically cleared for transfer to [facility]     Continue TF via [facility]  Therapies  Encourage OOB   Physiatry referral placed.     Review of Systems  Review of Systems   Constitutional: Positive for malaise/fatigue. Negative for chills and fever.   HENT: Negative for hearing loss.    Eyes: Negative for blurred vision and photophobia.   Respiratory: Positive for shortness of breath. Negative for cough and hemoptysis.    Cardiovascular: Negative for chest pain and palpitations.   Gastrointestinal: Positive for abdominal pain, nausea and vomiting. Negative for blood in stool and melena.   Genitourinary: Negative for dysuria and urgency.        De La Garza   Musculoskeletal: Negative for back pain and myalgias.   Skin: Negative for rash.   Neurological: Positive for weakness. Negative for dizziness and tingling.   Psychiatric/Behavioral: Negative for depression and substance abuse.        Vital Signs  Pulse:  [71-94] 84  Resp:  [12-28] 19  BP: ()/(51-65) 95/58  SpO2:  [91 %-100 %] 100 %    Physical Exam  Physical Exam  HENT:      Head: Normocephalic.      Mouth/Throat:      Mouth: Mucous membranes are moist.   Neck:      Musculoskeletal: Neck supple.   Cardiovascular:      Rate and Rhythm: Normal rate.   Pulmonary:      Effort: Pulmonary effort is normal.   Abdominal:      General: There is no distension.      Palpations: Abdomen is soft.      Tenderness: There is no abdominal tenderness.      Comments: Incision dry   Genitourinary:     Comments: De La Garza to gravity  Skin:     General: Skin is warm.   Neurological:      Mental Status: She is alert.         Laboratory  Recent Results (from the past 24 hour(s))   Basic Metabolic Panel    Collection Time: 03/27/20 12:05 PM   Result Value Ref Range    Sodium 153 (H) 135 - 145 mmol/L    Potassium 3.8 3.6 - 5.5 mmol/L     Chloride 119 (H) 96 - 112 mmol/L    Co2 27 20 - 33 mmol/L    Glucose 142 (H) 65 - 99 mg/dL    Bun 19 8 - 22 mg/dL    Creatinine 0.54 0.50 - 1.40 mg/dL    Calcium 7.0 (L) 8.5 - 10.5 mg/dL    Anion Gap 7.0 7.0 - 16.0   CBC WITHOUT DIFFERENTIAL    Collection Time: 03/27/20 12:05 PM   Result Value Ref Range    WBC 3.1 (L) 4.8 - 10.8 K/uL    RBC 2.43 (L) 4.20 - 5.40 M/uL    Hemoglobin 7.5 (L) 12.0 - 16.0 g/dL    Hematocrit 26.1 (L) 37.0 - 47.0 %    .4 (H) 81.4 - 97.8 fL    MCH 30.9 27.0 - 33.0 pg    MCHC 28.7 (L) 33.6 - 35.0 g/dL    RDW 70.9 (H) 35.9 - 50.0 fL    Platelet Count 85 (L) 164 - 446 K/uL    MPV 12.0 9.0 - 12.9 fL   RETICULOCYTES COUNT    Collection Time: 03/27/20 12:05 PM   Result Value Ref Range    Reticulocyte Count 1.9 0.8 - 2.1 %    Retic, Absolute 0.05 0.04 - 0.06 M/uL    Imm. Reticulocyte Fraction 13.8 9.3 - 17.4 %    Retic Hgb Equivalent 31.0 29.0 - 35.0 pg/cell   IRON/TOTAL IRON BIND    Collection Time: 03/27/20 12:05 PM   Result Value Ref Range    Iron 36 (L) 40 - 170 ug/dL    Total Iron Binding 84 (L) 250 - 450 ug/dL    Unsat Iron Binding 48 (L) 110 - 370 ug/dL    % Saturation see below 15 - 55 %   ESTIMATED GFR    Collection Time: 03/27/20 12:05 PM   Result Value Ref Range    GFR If African American >60 >60 mL/min/1.73 m 2    GFR If Non African American >60 >60 mL/min/1.73 m 2   Basic Metabolic Panel    Collection Time: 03/27/20  5:50 PM   Result Value Ref Range    Sodium 154 (H) 135 - 145 mmol/L    Potassium 3.4 (L) 3.6 - 5.5 mmol/L    Chloride 118 (H) 96 - 112 mmol/L    Co2 28 20 - 33 mmol/L    Glucose 142 (H) 65 - 99 mg/dL    Bun 19 8 - 22 mg/dL    Creatinine 0.53 0.50 - 1.40 mg/dL    Calcium 6.7 (LL) 8.5 - 10.5 mg/dL    Anion Gap 8.0 7.0 - 16.0   ESTIMATED GFR    Collection Time: 03/27/20  5:50 PM   Result Value Ref Range    GFR If African American >60 >60 mL/min/1.73 m 2    GFR If Non African American >60 >60 mL/min/1.73 m 2   Basic Metabolic Panel    Collection Time: 03/27/20 11:36 PM    Result Value Ref Range    Sodium 152 (H) 135 - 145 mmol/L    Potassium 4.0 3.6 - 5.5 mmol/L    Chloride 117 (H) 96 - 112 mmol/L    Co2 28 20 - 33 mmol/L    Glucose 183 (H) 65 - 99 mg/dL    Bun 18 8 - 22 mg/dL    Creatinine 0.50 0.50 - 1.40 mg/dL    Calcium 6.4 (LL) 8.5 - 10.5 mg/dL    Anion Gap 7.0 7.0 - 16.0   ESTIMATED GFR    Collection Time: 03/27/20 11:36 PM   Result Value Ref Range    GFR If African American >60 >60 mL/min/1.73 m 2    GFR If Non African American >60 >60 mL/min/1.73 m 2   CBC WITH DIFFERENTIAL    Collection Time: 03/28/20  5:34 AM   Result Value Ref Range    WBC 3.0 (L) 4.8 - 10.8 K/uL    RBC 2.10 (L) 4.20 - 5.40 M/uL    Hemoglobin 6.7 (L) 12.0 - 16.0 g/dL    Hematocrit 22.0 (L) 37.0 - 47.0 %    .8 (H) 81.4 - 97.8 fL    MCH 31.9 27.0 - 33.0 pg    MCHC 30.5 (L) 33.6 - 35.0 g/dL    RDW 66.4 (H) 35.9 - 50.0 fL    Platelet Count 73 (L) 164 - 446 K/uL    MPV 12.1 9.0 - 12.9 fL    Neutrophils-Polys 92.00 (H) 44.00 - 72.00 %    Lymphocytes 8.00 (L) 22.00 - 41.00 %    Monocytes 0.00 0.00 - 13.40 %    Eosinophils 0.00 0.00 - 6.90 %    Basophils 0.00 0.00 - 1.80 %    Nucleated RBC 0.00 /100 WBC    Neutrophils (Absolute) 2.76 2.00 - 7.15 K/uL    Lymphs (Absolute) 0.24 (L) 1.00 - 4.80 K/uL    Monos (Absolute) 0.00 0.00 - 0.85 K/uL    Eos (Absolute) 0.00 0.00 - 0.51 K/uL    Baso (Absolute) 0.00 0.00 - 0.12 K/uL    NRBC (Absolute) 0.00 K/uL    Hypochromia 1+     Anisocytosis 1+     Macrocytosis 1+    Comp Metabolic Panel    Collection Time: 03/28/20  5:34 AM   Result Value Ref Range    Sodium 151 (H) 135 - 145 mmol/L    Potassium 3.6 3.6 - 5.5 mmol/L    Chloride 116 (H) 96 - 112 mmol/L    Co2 27 20 - 33 mmol/L    Anion Gap 8.0 7.0 - 16.0    Glucose 134 (H) 65 - 99 mg/dL    Bun 16 8 - 22 mg/dL    Creatinine 0.50 0.50 - 1.40 mg/dL    Calcium 6.5 (LL) 8.5 - 10.5 mg/dL    AST(SGOT) 10 (L) 12 - 45 U/L    ALT(SGPT) 12 2 - 50 U/L    Alkaline Phosphatase 74 30 - 99 U/L    Total Bilirubin 0.4 0.1 - 1.5 mg/dL     Albumin 1.5 (L) 3.2 - 4.9 g/dL    Total Protein 4.1 (L) 6.0 - 8.2 g/dL    Globulin 2.6 1.9 - 3.5 g/dL    A-G Ratio 0.6 g/dL   MAGNESIUM    Collection Time: 03/28/20  5:34 AM   Result Value Ref Range    Magnesium 1.8 1.5 - 2.5 mg/dL   PHOSPHORUS    Collection Time: 03/28/20  5:34 AM   Result Value Ref Range    Phosphorus 2.6 2.5 - 4.5 mg/dL   DIFFERENTIAL MANUAL    Collection Time: 03/28/20  5:34 AM   Result Value Ref Range    Manual Diff Status PERFORMED    PERIPHERAL SMEAR REVIEW    Collection Time: 03/28/20  5:34 AM   Result Value Ref Range    Peripheral Smear Review see below    PLATELET ESTIMATE    Collection Time: 03/28/20  5:34 AM   Result Value Ref Range    Plt Estimation Decreased    MORPHOLOGY    Collection Time: 03/28/20  5:34 AM   Result Value Ref Range    RBC Morphology Present     Large Platelets 1+     Giant Platelets 1+     Polychromia 1+     Poikilocytosis 1+     Ovalocytes 1+     Rouleaux Slight     Toxic Gran Slight    ESTIMATED GFR    Collection Time: 03/28/20  5:34 AM   Result Value Ref Range    GFR If African American >60 >60 mL/min/1.73 m 2    GFR If Non African American >60 >60 mL/min/1.73 m 2       Fluids    Intake/Output Summary (Last 24 hours) at 3/28/2020 1009  Last data filed at 3/28/2020 0800  Gross per 24 hour   Intake 2425.84 ml   Output 1080 ml   Net 1345.84 ml       Core Measures & Quality Metrics    De La Garza catheter: No De La Garza      DVT Prophylaxis: Enoxaparin (Lovenox)    Ulcer prophylaxis: Yes    Assessed for rehab: Patient was assess for and/or received rehabilitation services during this hospitalization    NORBERT Score  ETOH Screening    Assessment/Plan  * Septic shock (HCC)- (present on admission)  Assessment & Plan  Initially required resuscitation with crystalloid and vasopressors  3/23  Weaned off levophed and vasopressin ggt  3/27 Zosyn day #7 of 7 (7 day course given her significant immunosuppression)      Hypernatremia- (present on admission)  Assessment & Plan  3/25 serum  sodium up to 160. Unable to give enteral water. D5W started IV with q6 hour BMPs, monitor closely.   3/26 continuing D5W drip and q6 hour BMPs. Continue to trend sodium.   3/27 Cortrak placed - will start small amount of free water.  3/28 151    Bacteremia due to Escherichia coli- (present on admission)  Assessment & Plan  3/21 Blood cultures x 2 and urine culture positive for E. Coli  3/28 Day 7 of 7 zosyn    Respiratory failure following trauma and surgery (HCC)- (present on admission)  Assessment & Plan  Remained on the ventilator post operation  3/22 extubated  3/23 weak cough  Remains high risk of decompensation and need for reintubation    Perforation of intestine (HCC)- (present on admission)  Assessment & Plan  Bowel obstruction with perforation  3/21 ex lap, JENNIFER, primary repair of jejunal perforation and stricturoplasty  Awaiting increasing GI function.  Dr. Interiano, general surgery    Rectal cancer (HCC)- (present on admission)  Assessment & Plan  Receiving xrt and chemotherapy (Xeloda) up until hospital admission for SBO  Dr. Chappell, radiation oncology.  Dr. Matthew, medical oncology.    Anemia- (present on admission)  Assessment & Plan  Critical anemia - multifactorial - suppressed and ? Blood loss  3/27 Hg 6.7 - checl iron studies    Severe protein-calorie malnutrition (HCC)- (present on admission)  Assessment & Plan  Albumin low - 1.5  3/27 Failed swallow - Place cortrak, start trickle feeds      Urinary retention- (present on admission)  Assessment & Plan  Wheatley removed 3/25 followed by multiple bouts of retention and incontinence with lower abdominal pain.  3/26  wheatley catheter replaced    Thrombocytopenia (HCC)- (present on admission)  Assessment & Plan  Likely sepsis associated  3/24/2020 slight improvement, transitioned famotidine to pantoprazole  Trend    Hypokalemia- (present on admission)  Assessment & Plan  Recurrent hypokalemia, frequent PVC and PACs  Replete and trend with goal serum potassium  above 4 and ensuring adequate serum magnesium levels    Acute kidney injury (HCC)- (present on admission)  Assessment & Plan  Initial creatinine 1.98  Improving with resuscitation  3/22 normaliezed  Avoid nephrotoxins        Discussed patient condition with RN, Patient and trauma surgery. Dr. Adler

## 2020-03-28 NOTE — DISCHARGE PLANNING
[facility] Transitional [facility] Coordination  Referral from:  DEAN Mosley  Facesheet indicates: [facility]     Potential Rehab Diagnosis: Debility  DX with stage IIA rectal cancer in the midst of chemotherapy, radiation, severe dehydration, dx with bowel obstruction  3/21 S/p exp lap - lysis of adhesions/ repair of jejunal perforation and stricturoplasty.       Chart review indicates patient does have on going medical management and therapy needs to possibly meet inpatient rehab facility criteria with the goal of returning to community.    D/C support: Lives alone in [place]; 2 story home;  dtr Nerissa Barksdale       Physiatry consultation forwarded per protocol.      Plan:  Will need to confirm with family available assistance.     Thank you for the referral.      Will continue to follow.

## 2020-03-28 NOTE — CARE PLAN
Problem: Respiratory:  Goal: Respiratory status will improve  Intervention: Assess and monitor pulmonary status  Note: IS at bedside, education provided for IS use, coughing and deep breathing. IS at 1000.       Problem: Skin Integrity  Goal: Risk for impaired skin integrity will decrease  Intervention: Assess risk factors for impaired skin integrity and/or pressure ulcers  Note: Darek scale being used to assess skin break down risks.  Providing assistance with repositioning.  Collaborating and communicating with health care team to prevent pressure ulcer.  PRADIP & Q2 turns in place.

## 2020-03-28 NOTE — PROGRESS NOTES
Attempted x 3 to reach [facility] RN by phone to give report. Patient to be transferred with this RN and transport at this time. Report to be given in person to RN assuming care.
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
## 2020-03-28 NOTE — PROGRESS NOTES
Full report given bedside to Alexx CANDELARIO on [facility]. VSS at this time. Patient alert and oriented and comfortable in bed on [facility].     6 hour chart check complete.

## 2020-03-28 NOTE — DISCHARGE PLANNING
Pt transferred off unit. LSW confirmed that a PMR consult has been placed. No dc orders/referrals at this time.

## 2020-03-28 NOTE — CARE PLAN
Problem: Infection  Goal: Will remain free from infection  Outcome: PROGRESSING AS EXPECTED  Intervention: Assess signs and symptoms of infection  Note: Assess patient for signs and symptoms of infection throughout shift. Monitor pertinent lab values in regards to current infection. Administer antibiotics as ordered and provide any education regarding infection.       Problem: Skin Integrity  Goal: Risk for impaired skin integrity will decrease  Outcome: PROGRESSING AS EXPECTED  Intervention: Assess risk factors for impaired skin integrity and/or pressure ulcers  Note: Assess patient skin throughout shift. Ensure patient is repositioned q 2 hour throughout shift. Ensure all dressings are in place and redressed per orders. Educate patient and available family members on importance of prevention of skin breakdown.

## 2020-03-28 NOTE — PROGRESS NOTES
Progress Note    Author:  Pamela Interiano MD    Date & Time:   3/28/2020   3:06 PM          Patient ID:             Name:             Elle Barksdale   YOB: 1940  Age:                 80 y.o.  female   MRN:               [id number]    ________________________________________________________________________      Interval Events:       Downgrade and transfer to floor today  No acute changes         Exam:       Vitals:    03/28/20 1245   BP: (!) 92/60   Pulse: 87   Resp: 16   Temp: 36.7 °C (98.1 °F)   SpO2: 99%     SpO2  Min: 90 %  Max: 100 %  O2 (LPM)  Min: 1  Max: 4  FiO2%  Min: 30 %  Max: 50 %  Arterial MAP   Min: 53 mmHg  Max: 91 mmHg  Temp  Min: 34.9 °C (94.8 °F)  Max: 38.2 °C (100.7 °F)    Intake/Output Summary (Last 24 hours) at 3/28/2020 1506  Last data filed at 3/28/2020 1000  Gross per 24 hour   Intake 2274.59 ml   Output 996 ml   Net 1278.59 ml       DIET ORDERS (From admission to next 24h)     Start     Ordered    03/28/20 0911  Diet Tube Feeding  CONTINUOUS DIET     Comments:  Start Impact 1.5 @ 10 ml/hr and advance only with MD orders   Question Answer Comment   Which Rate/Volume? 20 ml/hr    Formula: IMPACT PEPTIDE 1.5    Specify Type: CONTINUOUS        03/28/20 0912    03/26/20 1432  Diet NPO  ALL MEALS     Question:  Restrict to:  Answer:  Ice Chips  Comment:  single ice chips with RN only    03/26/20 1432                    Physical Exam  Nursing note reviewed.   Constitutional:       Appearance: NAD  Cardiovascular:      Rate and Rhythm: Normal rate and regular rhythm. Extremities warm, +BUE/BLE edema  Pulmonary:      Effort: Pulmonary effort is normal.      Breath sounds:  No wheezes or stridor  Abdominal:      General: Abdomen is soft, Non-tender  Incision c/d/i no erythema        Recent Labs     03/26/20  0550  03/27/20  0545  03/27/20  1750 03/27/20  2336 03/28/20  0534   SODIUM 157*   < > 155*   < > 154* 152* 151*   POTASSIUM 3.5*   < > 3.4*   < > 3.4* 4.0 3.6   CHLORIDE 119*   < >  118*   < > 118* 117* 116*   CO2 30   < > 29   < > 28 28 27   BUN 29*   < > 20   < > 19 18 16   CREATININE 0.76   < > 0.55   < > 0.53 0.50 0.50   MAGNESIUM 2.1  --  2.2  --   --   --  1.8   PHOSPHORUS 2.5  --  1.8*  --   --   --  2.6   CALCIUM 6.8*   < > 6.8*   < > 6.7* 6.4* 6.5*    < > = values in this interval not displayed.       Recent Labs     03/26/20  0550  03/27/20  0545  03/27/20  1750 03/27/20  2336 03/28/20  0534   ALTSGPT 13  --  14  --   --   --  12   ASTSGOT 13  --  13  --   --   --  10*   ALKPHOSPHAT 78  --  73  --   --   --  74   TBILIRUBIN 0.4  --  0.4  --   --   --  0.4   GLUCOSE 156*   < > 142*   < > 142* 183* 134*    < > = values in this interval not displayed.       Recent Labs     03/27/20  0545 03/27/20  1205 03/28/20  0534   RBC 2.16* 2.43* 2.10*   HEMOGLOBIN 6.7* 7.5* 6.7*   HEMATOCRIT 23.1* 26.1* 22.0*   PLATELETCT 63* 85* 73*   IRON  --  36*  --    TOTIRONBC  --  84*  --        Recent Labs     03/26/20  0550 03/26/20  1155 03/27/20  0545 03/27/20  1205 03/28/20  0534   WBC 3.0* 3.0* 2.3* 3.1* 3.0*   NEUTSPOLYS 93.00* 88.60* 93.90*  --  92.00*   LYMPHOCYTES 4.40* 7.00* 5.20*  --  8.00*   MONOCYTES 0.90 0.90 0.90  --  0.00   EOSINOPHILS 0.80 2.60 0.00  --  0.00   BASOPHILS 0.00 0.00 0.00  --  0.00   ASTSGOT 13  --  13  --  10*   ALTSGPT 13  --  14  --  12   ALKPHOSPHAT 78  --  73  --  74   TBILIRUBIN 0.4  --  0.4  --  0.4         ________________________________________________________________________      Patient Active Problem List   Diagnosis   • Osteoarthritis   • Blood per rectum   • Rectal cancer (HCC)   • Septic shock (HCC)   • Perforation of intestine (HCC)   • Acute kidney injury (HCC)   • Hypokalemia   • Respiratory failure following trauma and surgery (HCC)   • Bacteremia due to Escherichia coli   • Thrombocytopenia (HCC)   • Hypernatremia   • Urinary retention   • Severe protein-calorie malnutrition (HCC)   • Anemia       Plan:  Advance tube feeds to goal  Monitor and replete  electrolytes    Pancytopenia: monitor for now  Urinary retention; continue wheatley for now      PT/OT  Encourage IS/Pulmonary toilet

## 2020-03-28 NOTE — PROGRESS NOTES
Cortrak Placement    Tube Team verified patient name and medical record number prior to tube placement.  Cortrak tube (55 inches, 10 Macanese) placed at 65 cm in right nare.  Per Cortrak picture, tube appears to be in the small bowel.  Nursing Instructions: Awaiting KUB to confirm placement before use for medications or feeding. Once placement confirmed, flush tube with 30 ml of water, and then remove and save stylet, in patient medication drawer.

## 2020-03-29 PROBLEM — D61.818 PANCYTOPENIA (HCC): Status: ACTIVE | Noted: 2020-01-01

## 2020-03-29 PROBLEM — E83.39 HYPOPHOSPHATEMIA: Status: ACTIVE | Noted: 2020-01-01

## 2020-03-29 NOTE — DISCHARGE PLANNING
[facility]  Referral sent to Physiatry for consultation.   I also requested updated PT/OT notes .    Will continue to follow.     Thank you for referral

## 2020-03-29 NOTE — CONSULTS
Hospital Medicine Consultation    Date of Service  3/29/2020    Referring Physician  Pamela Interiano M.D.    Consulting Physician  Jenn Perkins M.D.    Reason for Consultation  Electrolyte abnormalities, pancytopenia    History of Presenting Illness  80 y.o. female with a h/o rectal cancer s/p chemoradiation who presented 3/21/2020 with worsening abdominal pain.  CT of the abdomen and pelvis showed small bowel obstruction with pneumatosis involving dilated loops concerning for bowel necrosis.  She was admitted to the ICU and started on pressors.  She underwent exploratory laparotomy and small bowel resection on 3/21/2020 with Dr. Interiano.  She was extubated on 3/22/20.  She had positive blood and urine cultures for E. Coli, pansensitive.  She was given zosyn x 8 days.  Her course was c/b hypernatremia, EMELINA, dysphagia requiring NG feeding tube, electrolyte abnormalities, pancytopenia.     Seen and examined at bedside.  She reports feeling tired and having pain around her bottom.  Of note sacral wounds noted on day of admission and wound care consulted.  She's recently been having loose stools and wound care recommended rectal tube.  She's on room air as of today and denies any chest pain or shortness of breath.  No nausea/vomiting.  She's been getting IVF with D5W to help with hypernatremia.      Review of Systems  Review of Systems   Constitutional: Positive for malaise/fatigue. Negative for chills and fever.   HENT: Negative for nosebleeds.    Eyes: Negative for blurred vision.   Respiratory: Negative for cough and shortness of breath.    Cardiovascular: Positive for leg swelling. Negative for chest pain.   Gastrointestinal: Positive for diarrhea. Negative for abdominal pain, nausea and vomiting.   Genitourinary: Negative for hematuria.   Musculoskeletal: Negative for falls.   Skin:        Wounds, sacral   Neurological: Positive for weakness.   Endo/Heme/Allergies: Bruises/bleeds easily.       Past Medical  History   has a past medical history of Acute pain of left knee (10/26/2017), Leg swelling (10/26/2017), Osteoarthritis (6/11/2018), Rectal cancer (HCC), and Right knee injury (4/4/2013).    Surgical History   has a past surgical history that includes appendectomy; tonsillectomy; cataract phaco with iol (12/16/2013); cataract phaco with iol (1/13/2014); colonoscopy with biopsy; and pr exploratory of abdomen (N/A, 3/21/2020).    Family History  family history includes Cancer in her father; Hypertension in her mother; No Known Problems in her brother, daughter, daughter, and daughter.    Social History   reports that she quit smoking about 30 years ago. Her smoking use included cigarettes. She has a 30.00 pack-year smoking history. She has never used smokeless tobacco. She reports current alcohol use. She reports that she does not use drugs.    Medications  Prior to Admission Medications   Prescriptions Last Dose Informant Patient Reported? Taking?   acetaminophen (TYLENOL) 325 MG Tab PRN Family Member Yes Yes   Sig: Take 650 mg by mouth every four hours as needed.   ondansetron (ZOFRAN ODT) 8 MG TABLET DISPERSIBLE PRN Family Member Yes No   Sig: DISSOLVE 1 TABLET BY MOUTH EVERY 12 HOURS AS NEEDED FOR NAUSEA VOMITING   prochlorperazine (COMPAZINE) 10 MG Tab PRN Family Member Yes No   Sig: Take 10 mg by mouth every 6 hours as needed.      Facility-Administered Medications: None       Allergies  No Known Allergies    Physical Exam  Temp:  [36.1 °C (97 °F)-36.8 °C (98.2 °F)] 36.7 °C (98 °F)  Pulse:  [60-99] 88  Resp:  [16-20] 18  BP: ()/(51-65) 111/60  SpO2:  [93 %-98 %] 94 %    Physical Exam  Vitals signs and nursing note reviewed.   Constitutional:       General: She is not in acute distress.     Appearance: She is ill-appearing. She is not toxic-appearing.   HENT:      Head: Normocephalic and atraumatic.      Nose:      Comments: NG in place     Mouth/Throat:      Mouth: Mucous membranes are dry.   Eyes:       General: No scleral icterus.     Conjunctiva/sclera: Conjunctivae normal.   Neck:      Musculoskeletal: Normal range of motion.   Cardiovascular:      Rate and Rhythm: Normal rate and regular rhythm.      Heart sounds: Murmur present.      Comments: Cyanotic fingers on both hands  Pulmonary:      Effort: Pulmonary effort is normal.      Breath sounds: Normal breath sounds.   Abdominal:      General: Bowel sounds are normal. There is no distension.      Palpations: Abdomen is soft.      Tenderness: There is no abdominal tenderness.      Comments: Abdominal incision c/d/i   Musculoskeletal:      Right lower leg: Edema present.      Left lower leg: Edema present.   Skin:     Capillary Refill: Capillary refill takes more than 3 seconds.      Comments: Cool hands/feet   Neurological:      Mental Status: She is easily aroused. She is lethargic.   Psychiatric:         Mood and Affect: Mood normal.         Behavior: Behavior normal.         Fluids  Date 03/29/20 0700 - 03/30/20 0659   Shift 2933-8329 7331-8685 2884-1025 24 Hour Total   INTAKE   Shift Total       OUTPUT   Urine 450   450   Shift Total 450   450   Weight (kg) 76.8 76.8 76.8 76.8       Laboratory  Recent Labs     03/27/20  1205 03/28/20  0534 03/29/20  0530   WBC 3.1* 3.0* 3.1*   RBC 2.43* 2.10* 2.13*   HEMOGLOBIN 7.5* 6.7* 6.7*   HEMATOCRIT 26.1* 22.0* 22.4*   .4* 104.8* 105.2*   MCH 30.9 31.9 31.5   MCHC 28.7* 30.5* 29.9*   RDW 70.9* 66.4* 66.2*   PLATELETCT 85* 73* 80*   MPV 12.0 12.1 11.7     Recent Labs     03/27/20  2336 03/28/20  0534 03/29/20  0530   SODIUM 152* 151* 147*   POTASSIUM 4.0 3.6 3.1*   CHLORIDE 117* 116* 116*   CO2 28 27 26   GLUCOSE 183* 134* 120*   BUN 18 16 17   CREATININE 0.50 0.50 0.39*   CALCIUM 6.4* 6.5* 6.7*                     Imaging  DX-ABDOMEN FOR TUBE PLACEMENT   Final Result      Enteric feeding tube tip terminates over the expected region of the first portion of the duodenum.      DX-ABDOMEN FOR TUBE PLACEMENT   Final  Result         Feeding tube with tip projecting over the expected area of the second portion duodenum.      [id number] VIEW   Final Result      NG tube tip overlies the gastric body.      DX-CHEST-PORTABLE (1 VIEW)   Final Result      1.  Interval extubation.   2.  Stable mild left basilar atelectasis and/or consolidation.      DX-CHEST-PORTABLE (1 VIEW)   Final Result         1. Interval intubation, with well-positioned lines and tubes.   2. Mild left basilar atelectasis. No new consolidation or significant pleural effusions.         CT-ABDOMEN-PELVIS W/O   Final Result      1.  Dilated small intestine with decompression distally and decompression of the colon consistent with small bowel obstruction. There is pneumatosis involving dilated loops of small intestine consistent with bowel necrosis/ischemia.      2.  Small amount of ascites.      3.  Right lung base atelectasis/consolidation and small right pleural effusion.      This was discussed with ALIYAH TOSCANO at below the 6:00 AM on 3/21/2020.      DX-CHEST-PORTABLE (1 VIEW)   Final Result      1.  Interval placement of a right IJ central line without evidence of complication.      2.  Cardiomegaly.      DX-CHEST-PORTABLE (1 VIEW)   Final Result      Borderline cardiomegaly.          Assessment/Plan  * Perforation of intestine (HCC)- (present on admission)  Assessment & Plan  CT scan of abdominal pelvis suggested bowel obstruction with pneumatosis involving the small bowels concerning for ischemia or necrosis.    S/p ex lap with small bowel resection 3/21/20 with Dr. 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  Status post chemo and radiation therapy.    Anemia- (present on admission)  Assessment & Plan  Likely in setting of surgery, infection  Macrocytic  Will give 1 unit pRBC 3/29/20 for persistently low H/H (although stable, slightly low BPs)    Severe protein-calorie malnutrition (HCC)- (present on admission)  Assessment & Plan  Nutrition following  NG in place, getting tube feeds, going to goal today:Impact Peptide 1.5 with goal rate of 40 ml/hr      Thrombocytopenia (HCC)- (present on admission)  Assessment & Plan  Likely in setting of surgery, infection  Will hold lovenox  CTM    Hypokalemia- (present on admission)  Assessment & Plan  Replaced.  Continue to monitor.    Septic shock (HCC)- (present on admission)  Assessment & Plan  Resolved  This is Septic shock Present on admission  SIRS criteria identified on my evaluation include: Tachycardia, with heart rate greater than 90 BPM, Tachypnea, with respirations greater than 20 per minute and Leukopenia, with WBC less than 4,000  Source is Intra abdominal   Presentation includes: Severe sepsis present and initial Lactate level result is >= 4 mmol/L.   Despite appropriate fluid resuscitation with crystalloid given per sepsis guidelines, the patient remains hypotensive with systolic blood pressure less than 90 or MAP less than 65  Hemodynamic support with additional fluids and IV vasopressors as needed to maintain a SBP of 90 or MAP of 65  IV antibiotics as appropriate for source of sepsis  Reassessment: I have reassessed the patient's hemodynamic status      Pancytopenia (HCC)  Assessment & Plan  Likely in setting of acute severe illness  CTM    Hypophosphatemia  Assessment & Plan  S/p IV supp  CTM    Respiratory failure following trauma and surgery (HCC)- (present on admission)  Assessment & Plan  Resolved  On RA    Acute kidney injury (HCC)- (present on admission)  Assessment & Plan  Resolved  Likely secondary to sepsis and septic shock.  Fluid and  hemodynamics resuscitation  Avoid nephrotoxins  Renal dose all medication    Will continue to follow.    Jenn Perkins MD  Internal Medicine-Pediatrics Hospitalist  [facility]

## 2020-03-29 NOTE — CARE PLAN
Problem: Communication  Goal: The ability to communicate needs accurately and effectively will improve  Outcome: PROGRESSING AS EXPECTED     Problem: Safety  Goal: Will remain free from injury  Outcome: PROGRESSING AS EXPECTED  Goal: Will remain free from falls  Outcome: PROGRESSING AS EXPECTED     Problem: Infection  Goal: Will remain free from infection  Outcome: PROGRESSING AS EXPECTED     Problem: Venous Thromboembolism (VTW)/Deep Vein Thrombosis (DVT) Prevention:  Goal: Patient will participate in Venous Thrombosis (VTE)/Deep Vein Thrombosis (DVT)Prevention Measures  Outcome: PROGRESSING AS EXPECTED     Problem: Bowel/Gastric:  Goal: Normal bowel function is maintained or improved  Outcome: PROGRESSING AS EXPECTED  Goal: Will not experience complications related to bowel motility  Outcome: PROGRESSING AS EXPECTED     Problem: Knowledge Deficit  Goal: Knowledge of disease process/condition, treatment plan, diagnostic tests, and medications will improve  Outcome: PROGRESSING AS EXPECTED  Goal: Knowledge of the prescribed therapeutic regimen will improve  Outcome: PROGRESSING AS EXPECTED     Problem: Discharge Barriers/Planning  Goal: Patient's continuum of care needs will be met  Outcome: PROGRESSING AS EXPECTED     Problem: Pain Management  Goal: Pain level will decrease to patient's comfort goal  Outcome: PROGRESSING AS EXPECTED     Problem: Respiratory:  Goal: Respiratory status will improve  Outcome: PROGRESSING AS EXPECTED     Problem: Fluid Volume:  Goal: Will maintain balanced intake and output  Outcome: PROGRESSING AS EXPECTED     Problem: Skin Integrity  Goal: Risk for impaired skin integrity will decrease  Outcome: PROGRESSING AS EXPECTED     Problem: Urinary Elimination:  Goal: Ability to reestablish a normal urinary elimination pattern will improve  Outcome: PROGRESSING AS EXPECTED     Problem: Mobility  Goal: Risk for activity intolerance will decrease  Outcome: PROGRESSING AS EXPECTED

## 2020-03-29 NOTE — PROGRESS NOTES
Progress Note    Author:  Pamela Interiano MD    Date & Time:   3/29/2020   2:34 PM          Patient ID:             Name:             Elle Barksdale   YOB: 1940  Age:                 80 y.o.  female   MRN:               [id number]    ________________________________________________________________________      Interval Events:       Patient resting, appears comfortably  Denies abdominal pain    Denies nausea  +liquid bms, reglan and stool softeners stopped       Exam:       Vitals:    03/29/20 1404   BP:    Pulse: 88   Resp: 18   Temp:    SpO2: 94%     SpO2  Min: 90 %  Max: 100 %  O2 (LPM)  Min: 0  Max: 4  FiO2%  Min: 21 %  Max: 24 %  Arterial MAP   Min: 61 mmHg  Max: 91 mmHg  Temp  Min: 36.1 °C (97 °F)  Max: 38.2 °C (100.7 °F)    Intake/Output Summary (Last 24 hours) at 3/29/2020 1434  Last data filed at 3/29/2020 1141  Gross per 24 hour   Intake 150 ml   Output 1850 ml   Net -1700 ml       DIET ORDERS (From admission to next 24h)     Start     Ordered    03/29/20 1302  Diet Tube Feeding  CONTINUOUS DIET     Comments:  Start Impact 1.5 @ 10 ml/hr and okay to advance to goal per protocol per MD   Question Answer Comment   Which Rate/Volume? 40 ml/hr    Formula: IMPACT PEPTIDE 1.5    Specify Type: CONTINUOUS        03/29/20 1301    03/26/20 1432  Diet NPO  ALL MEALS     Question:  Restrict to:  Answer:  Ice Chips  Comment:  single ice chips with RN only    03/26/20 1432                    Physical Exam  Nursing note reviewed.   Constitutional:       Appearance: Normal appearance. Alert, oriented x 4.NAD  HENT:      Head: Normocephalic and atraumatic.      Mouth/Throat:      Mouth: Mucous membranes are dry    Cardiovascular:      Rate and Rhythm: Normal rate and regular rhythm. Extremities warm, 2+BLE edema  Pulmonary:      Effort: Pulmonary effort is normal.      Breath sounds:  No wheezes or stridor  Abdominal:        Palpations: Abdomen is soft. Mildly distended. Non-tender          Recent Labs      03/27/20  0545  03/27/20  2336 03/28/20  0534 03/29/20  0530   SODIUM 155*   < > 152* 151* 147*   POTASSIUM 3.4*   < > 4.0 3.6 3.1*   CHLORIDE 118*   < > 117* 116* 116*   CO2 29   < > 28 27 26   BUN 20   < > 18 16 17   CREATININE 0.55   < > 0.50 0.50 0.39*   MAGNESIUM 2.2  --   --  1.8 1.6   PHOSPHORUS 1.8*  --   --  2.6 2.1*   CALCIUM 6.8*   < > 6.4* 6.5* 6.7*    < > = values in this interval not displayed.       Recent Labs     03/27/20  0545  03/27/20  2336 03/28/20  0534 03/29/20  0530   ALTSGPT 14  --   --  12 11   ASTSGOT 13  --   --  10* 12   ALKPHOSPHAT 73  --   --  74 74   TBILIRUBIN 0.4  --   --  0.4 0.4   GLUCOSE 142*   < > 183* 134* 120*    < > = values in this interval not displayed.       Recent Labs     03/27/20  1205 03/28/20  0534 03/29/20  0530   RBC 2.43* 2.10* 2.13*   HEMOGLOBIN 7.5* 6.7* 6.7*   HEMATOCRIT 26.1* 22.0* 22.4*   PLATELETCT 85* 73* 80*   IRON 36*  --   --    TOTIRONBC 84*  --   --        Recent Labs     03/27/20  0545 03/27/20  1205 03/28/20  0534 03/29/20  0530   WBC 2.3* 3.1* 3.0* 3.1*   NEUTSPOLYS 93.90*  --  92.00* 87.70*   LYMPHOCYTES 5.20*  --  8.00* 5.30*   MONOCYTES 0.90  --  0.00 2.60   EOSINOPHILS 0.00  --  0.00 1.70   BASOPHILS 0.00  --  0.00 0.00   ASTSGOT 13  --  10* 12   ALTSGPT 14  --  12 11   ALKPHOSPHAT 73  --  74 74   TBILIRUBIN 0.4  --  0.4 0.4         ________________________________________________________________________      Patient Active Problem List   Diagnosis   • Osteoarthritis   • Blood per rectum   • Rectal cancer (HCC)   • Septic shock (HCC)   • Perforation of intestine (HCC)   • Acute kidney injury (HCC)   • Hypokalemia   • Respiratory failure following trauma and surgery (HCC)   • Bacteremia due to Escherichia coli   • Thrombocytopenia (HCC)   • Hypernatremia   • Urinary retention   • Severe protein-calorie malnutrition (HCC)   • Anemia       Plan:  Advance tube feeds to goal  Patients Hb stable but remains low 6.7-7.1, mildly symptomatic sbp low 90s,  also patient lethargic  Plan for transfusion 1 unit today

## 2020-03-29 NOTE — DIETARY
Nutrition Services: Nutrition Support Brief Note    TF consult received. Pt previously had Impact Peptide 1.5 with goal rate 40 ml/hr ordered. Then yesterday, TF goal was decreased to 20 ml/hr. Today, RN reports okay per MD to advance to goal but unsure what goal is.    Recommendations/Plan:  1. Impact Peptide 1.5 with goal rate of 40 ml/hr remains appropriate to provide 1440 kcals, 90 gm protein, and 739 ml free water. Discussed with MD and RN.  2. Fluids per MD.    RD to follow.

## 2020-03-29 NOTE — CONSULTS
Chart reviewed. Patient with continued hypernatremia, pancytopenia. Now requiring tube feeds as of 3/27. Patient had acute decline with therapies 3/27. Pt needs to be more medically stable and able to participate in therapies prior to consideration for ARU. Will need to clarify discharge plan as she needs to have appropriate caregiver support on discharge from ARU, should she be admitted.     Dr. Apurva Kenney DO, MS  Department of Physical Medicine & Rehabilitation  3/29/2020  11:27 AM

## 2020-03-29 NOTE — WOUND TEAM
[facility]  Inpatient Services  Wound and Skin Care Progress Note    Admission Date: 3/21/2020     Last order of IP CONSULT TO WOUND CARE was found on 3/21/2020 from Hospital Encounter on 3/21/2020     HPI, PMH, SH: Reviewed    Unit where seen by Wound Team: [id number]     WOUND CONSULT/FOLLOW UP RELATED TO:  Buttocks wounds      Self Report / Pain Level:  No complaints of pain        OBJECTIVE:  In bed. Issues with frequent incontinent stool.  Asked to place BMS.  Dr. Interiano and Hospitalist OK'd rectal tube.  RN thinks stool will subside now that softeners/laxatives stopped.     WOUND TYPE, LOCATION, CHARACTERISTICS (Pressure Injuries: location, stage, POA or date identified)              Wound 03/21/20 Radiation Buttocks;Rectum;Sacrum;Coccyx;Groin full thickness to sacrum/coccyx, partial thickness to buttocks (Active)   Wound Image    3/29/2020  1:30 PM   Site Assessment CHUY    Periwound Assessment Denuded    Margins Unattached edges    Closure None    Drainage Amount Small    Drainage Description Serosanguineous    Treatments Autolytic Debridement;Cleansed;Site care    Wound Cleansing Normal Saline Irrigation    Periwound Protectant Stoma Powder;Skin Protectant Wipes to Periwound    Dressing Cleansing/Solutions Not Applicable    Dressing Options Hydrocolloid Thick    Dressing Changed New    Dressing Status Clean;Dry;Intact    Dressing Change/Treatment Frequency Every 48 hrs, and As Needed    NEXT Dressing Change/Treatment Date 03/31/20    NEXT Weekly Photo (Inpatient Only) 04/04/20    Non-staged Wound Description Full thickness    Wound Length (cm) 11 cm    Wound Width (cm) 11 cm    Wound Surface Area (cm^2) 121 cm^2    WOUND NURSE ONLY - Time Spent with Patient (mins) 90      Vascular:    ARACELI:   No results found.    Lab Values:    Lab Results   Component Value Date/Time    WBC 3.1 (L) 03/29/2020 05:30 AM    RBC 2.13 (L) 03/29/2020 05:30 AM    HEMOGLOBIN 6.7 (L) 03/29/2020 05:30 AM    HEMATOCRIT 22.4  (L) 03/29/2020 05:30 AM      Culture Results show:  Recent Results (from the past 720 hour(s))   CULTURE WOUND W/ GRAM STAIN    Collection Time: 03/21/20  2:16 PM   Result Value Ref Range    Significant Indicator NEG     Source WND     Site Peritoneal Fluid     Culture Result No growth at 72 hours.     Gram Stain Result Few WBCs.  No organisms seen.          INTERVENTIONS BY WOUND TEAM:  Patient helped to turn self to left side with CNA.  Removed previous dressing, cleansed with NS and gauze.  Buttock/groin cleansed with warm wash cloth.  Nasal trumpet as rectal tube placed and connected to DD bag, covered port with wash cloth.  Crusted wound and kody-wound with no sting and stoma powder.  Covered with sacral hydrocolloid and attempted to seal with VAC drape at distal end of dressing.  PRADIP mattress ordered.  Pillow placed to left side.  Heel float boots in place.         Interdisciplinary consultation: Patient, Bedside RN (Alexx)     EVALUATION: patient with rectal cancer.  Admitted with SBO, micro perf.  NG in place.  Having pain and issues with frequent watery stool.  Dr. Interiano ok'd rectal tube, hopefully will subside in 1-2 days.     Goals: Steady decrease in wound area and depth weekly.    NURSING PLAN OF CARE ORDERS (X):    Dressing changes: See Dressing Care orders: X  Skin care: See Skin Care orders: X  Rectal tube care: See Rectal Tube Care orders:   Other orders:    WOUND TEAM PLAN OF CARE:   Dressing changes by wound team:          Follow up 1-2 times weekly:             X  Follow up 3 times weekly:                NPWT change 3 times weekly:     Follow up as needed:       Other (explain):     Anticipated discharge plans:   LTACH:        SNF/Rehab:                  Home Care:         Will need ongoing wound care post DC  Outpatient Wound Center:            Self Care:

## 2020-03-29 NOTE — PROGRESS NOTES
2 RN skin check with Leonor    · Skin breakdown noted on:   ? Pressure injury to sacrum (PTA) from radiation burn- Change Daily with Xeroform + Mepilex, scant bloody output  ? Bilat heels red/blanching- Mepilexs in place   ? Lips slightly cracked- lip moisturizer applied throughout shift  ? Bleeding moisture fissures to bilat breast & pannus folds- barrier cream applied & Interdry  ? Small scab to L nostril (NGT Site)  ? Generalized bruising throughout  ? Midline abdominal incision CDI & approximated with staples  ? Small skin tear under midline incision      · Q2H turns and repositioning  · Heels floated on pillows.  · Mepilex in place.    · Silicone oxygen tubing in place.

## 2020-03-30 PROBLEM — S31.000A SACRAL WOUND: Status: ACTIVE | Noted: 2020-01-01

## 2020-03-30 NOTE — PROGRESS NOTES
[facility] Medicine Daily Progress Note    Date of Service  3/30/2020    Chief Complaint  80 y.o. female admitted 3/21/2020 with abdominal pain    Hospital Course    80 y.o. female with a h/o rectal cancer s/p chemoradiation who presented 3/21/2020 with worsening abdominal pain.  CT of the abdomen and pelvis showed small bowel obstruction with pneumatosis involving dilated loops concerning for bowel necrosis.  She was admitted to the ICU and started on pressors.  She underwent exploratory laparotomy and small bowel resection on 3/21/2020 with Dr. Interiano.  She was extubated on 3/22/20.  She had positive blood and urine cultures for E. Coli, pansensitive.  She was given zosyn x 8 days.  Her course was c/b hypernatremia, EMELINA, dysphagia requiring NG feeding tube, electrolyte abnormalities, pancytopenia.       Interval Problem Update  - No acute events overnight  - afebrile  - Na worse today 147-->151, however high stool output, not on stool softners but getting reglan, will hold  - patient thirsty asking for water however speech therapy saw patient today who only recommended sips of nectars and ice chips with RN supervision  - tolerating tube feeds at goal (still continuous)  - Other electrolytes improved  - 1 units pRBCs yesterday with improvement in hemoglobin 6.7-->8.4  - patient more awake today, reports she overall feels better today, no complaints other than thirst and dry lips  - good UOP    Consultants/Specialty  Internal Medicine  Surgery (primary)    Code Status  FULL    Disposition  Rehab when medically clear    Review of Systems  Review of Systems   Constitutional: Positive for malaise/fatigue. Negative for chills and fever.   HENT:        Dry lips/mouth   Eyes: Negative for blurred vision.   Respiratory: Negative for cough and shortness of breath.    Cardiovascular: Negative for chest pain.   Gastrointestinal: Positive for diarrhea. Negative for abdominal pain, blood in stool, nausea and vomiting.    Genitourinary: Negative for hematuria.   Musculoskeletal: Negative for falls.   Neurological: Positive for weakness. Negative for dizziness.   Endo/Heme/Allergies: Bruises/bleeds easily.        Physical Exam  Temp:  [36.2 °C (97.2 °F)-36.9 °C (98.5 °F)] 36.2 °C (97.2 °F)  Pulse:  [] 71  Resp:  [16-20] 18  BP: (100-122)/(54-80) 106/62  SpO2:  [92 %-98 %] 98 %    Physical Exam  Vitals signs and nursing note reviewed.   Constitutional:       General: She is not in acute distress.     Appearance: She is ill-appearing. She is not toxic-appearing or diaphoretic.   HENT:      Head: Normocephalic and atraumatic.      Nose:      Comments: cortrak in place     Mouth/Throat:      Mouth: Mucous membranes are dry.   Eyes:      General: No scleral icterus.     Conjunctiva/sclera: Conjunctivae normal.   Neck:      Musculoskeletal: Normal range of motion and neck supple.      Comments: Central line RIJ c/d/i  Cardiovascular:      Rate and Rhythm: Normal rate and regular rhythm.      Heart sounds: No murmur. No friction rub. No gallop.    Pulmonary:      Effort: Pulmonary effort is normal.      Breath sounds: Normal breath sounds.   Abdominal:      General: Bowel sounds are normal. There is no distension.      Palpations: Abdomen is soft.      Tenderness: There is no abdominal tenderness.      Comments: Abdominal incision c/d/i   Musculoskeletal:      Comments: anasarca   Skin:     Comments: Extremities warmer today   Neurological:      General: No focal deficit present.      Mental Status: She is alert and oriented to person, place, and time.      Motor: No weakness.   Psychiatric:         Mood and Affect: Mood normal.         Behavior: Behavior normal.         Fluids    Intake/Output Summary (Last 24 hours) at 3/30/2020 1649  Last data filed at 3/30/2020 1610  Gross per 24 hour   Intake 2032.5 ml   Output 3100 ml   Net -1067.5 ml       Laboratory  Recent Labs     03/28/20  0534 03/29/20  0530 03/30/20  0359   WBC 3.0* 3.1*  3.0*   RBC 2.10* 2.13* 2.72*   HEMOGLOBIN 6.7* 6.7* 8.4*   HEMATOCRIT 22.0* 22.4* 27.0*   .8* 105.2* 99.3*   MCH 31.9 31.5 30.9   MCHC 30.5* 29.9* 31.1*   RDW 66.4* 66.2* 63.7*   PLATELETCT 73* 80* 105*   MPV 12.1 11.7 11.5     Recent Labs     03/28/20  0534 03/29/20  0530 03/30/20  0359   SODIUM 151* 147* 151*   POTASSIUM 3.6 3.1* 3.2*   CHLORIDE 116* 116* 119*   CO2 27 26 24   GLUCOSE 134* 120* 134*   BUN 16 17 19   CREATININE 0.50 0.39* 0.40*   CALCIUM 6.5* 6.7* 6.9*                   Imaging  DX-ABDOMEN FOR TUBE PLACEMENT   Final Result      Enteric feeding tube tip terminates over the expected region of the first portion of the duodenum.      DX-ABDOMEN FOR TUBE PLACEMENT   Final Result         Feeding tube with tip projecting over the expected area of the second portion duodenum.      [id number] VIEW   Final Result      NG tube tip overlies the gastric body.      DX-CHEST-PORTABLE (1 VIEW)   Final Result      1.  Interval extubation.   2.  Stable mild left basilar atelectasis and/or consolidation.      DX-CHEST-PORTABLE (1 VIEW)   Final Result         1. Interval intubation, with well-positioned lines and tubes.   2. Mild left basilar atelectasis. No new consolidation or significant pleural effusions.         CT-ABDOMEN-PELVIS W/O   Final Result      1.  Dilated small intestine with decompression distally and decompression of the colon consistent with small bowel obstruction. There is pneumatosis involving dilated loops of small intestine consistent with bowel necrosis/ischemia.      2.  Small amount of ascites.      3.  Right lung base atelectasis/consolidation and small right pleural effusion.      This was discussed with ALIYAH TOSCANO at below the 6:00 AM on 3/21/2020.      DX-CHEST-PORTABLE (1 VIEW)   Final Result      1.  Interval placement of a right IJ central line without evidence of complication.      2.  Cardiomegaly.      DX-CHEST-PORTABLE (1 VIEW)   Final Result      Borderline  cardiomegaly.           Assessment/Plan  * Perforation of intestine (HCC)- (present on admission)  Assessment & Plan  CT scan of abdominal pelvis suggested bowel obstruction with pneumatosis involving the small bowels concerning for ischemia or necrosis.    S/p ex lap with small bowel resection 3/21/20 with Dr. 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  Status post chemo and radiation therapy.    Anemia- (present on admission)  Assessment & Plan  Likely in setting of surgery, infection  Macrocytic  S/p 1 unit pRBC 3/29/20 for persistently low H/H (although stable, slightly low BPs)-->increased 8.4    Severe protein-calorie malnutrition (HCC)- (present on admission)  Assessment & Plan  Nutrition following  NG in place, getting tube feeds, going to goal today:Impact Peptide 1.5 with goal rate of 40 ml/hr      Urinary retention- (present on admission)  Assessment & Plan  [place] in place  Can hopefully remove and re-trial voiding when more mobile    Thrombocytopenia (HCC)- (present on admission)  Assessment & Plan  Improving  Likely in setting of surgery, infection  CTM    Hypokalemia- (present on admission)  Assessment & Plan  Replaced.  Continue to monitor.    Septic shock (HCC)- (present on admission)  Assessment & Plan  Resolved  This is Septic shock Present on admission  SIRS criteria identified on my evaluation include: Tachycardia, with heart rate  greater than 90 BPM, Tachypnea, with respirations greater than 20 per minute and Leukopenia, with WBC less than 4,000  Source is Intra abdominal   Presentation includes: Severe sepsis present and initial Lactate level result is >= 4 mmol/L.   Despite appropriate fluid resuscitation with crystalloid given per sepsis guidelines, the patient remains hypotensive with systolic blood pressure less than 90 or MAP less than 65  Hemodynamic support with additional fluids and IV vasopressors as needed to maintain a SBP of 90 or MAP of 65  IV antibiotics as appropriate for source of sepsis  Reassessment: I have reassessed the patient's hemodynamic status      Sacral wound  Assessment & Plan  Large sacral wound  Wound following, rectal tube in place to assist with healing    Pancytopenia (HCC)  Assessment & Plan  Likely in setting of acute severe illness  CTM    Hypophosphatemia  Assessment & Plan  Improved  S/p IV supp  CTM    Respiratory failure following trauma and surgery (HCC)- (present on admission)  Assessment & Plan  Resolved  On RA    Acute kidney injury (HCC)- (present on admission)  Assessment & Plan  Resolved  Likely secondary to sepsis and septic shock.  Fluid and hemodynamics resuscitation  Avoid nephrotoxins  Renal dose all medication       VTE prophylaxis: Lovenox

## 2020-03-30 NOTE — THERAPY
"Speech Language Therapy dysphagia treatment completed.     Functional Status:  Pt was seen for dysphagia tx per RN request.  RN reports pt is getting stronger and stated that MD would like pt to be able to drink liquids.  Pt was AAOx3, voice was reduced in volume however clear.  Pt consumed trials of ice chips (4) and nectars ( 2oz) via teaspoon and small cup sip without any overt s/sx of aspiration.  Voice remained unchanged and swallow trigger was timely.  With applesauce, pt triggered 3-4 effortful swallows to clear a small 1/2 teaspoon sized bolus, and reported globus sensation.  Pt fatigued towards to end of the session, thus no further PO trials were given.  Pt remains at risk for aspiration given weakness and length of time NPO, however she appears to be making gains.  Recommend to continue NPO/cortrak as primary source of nutrition, however pt is OK to have sips of nectars and ice chips with RN supervision as tolerated.  Please discontinue ice and/or nectars with fatigue or difficulty.  SLP continues to follow    Recommendations: 1) Continue cortrak for nutrition, 2) Pt is ok to have sips of nectars and ice chips with RN supervision as tolerated    Plan of Care: Will benefit from Speech Therapy 3 times per week    Post-Acute Therapy: Recommend inpatient transitional care services for continued speech therapy services.      See \"Rehab Therapy-Acute\" Patient Summary Report for complete documentation.     "

## 2020-03-30 NOTE — CARE PLAN
Problem: Safety  Goal: Will remain free from falls  Outcome: PROGRESSING AS EXPECTED     Problem: Infection  Goal: Will remain free from infection  Outcome: PROGRESSING AS EXPECTED     Problem: Venous Thromboembolism (VTW)/Deep Vein Thrombosis (DVT) Prevention:  Goal: Patient will participate in Venous Thrombosis (VTE)/Deep Vein Thrombosis (DVT)Prevention Measures  Outcome: PROGRESSING AS EXPECTED     Problem: Bowel/Gastric:  Goal: Normal bowel function is maintained or improved  Outcome: PROGRESSING AS EXPECTED

## 2020-03-30 NOTE — PROGRESS NOTES
Assumed care of patient at 1530.  Patient is drowsy but easily awoken.  Oriented to person, place, and situation.  Unsure of exact date but did know it was 2020.  VSS on RA.  Midline abdominal incision with staples JONATHAN.  No drainage.  Patient denies pain.  Cortrack in place with impact peptide 1.5 running at 30 ml/hr (goal is 40 ml/hr).  Patient c/o feeling full after potassium chloride given through tube.  Feeding paused.  Will attempt to restart prior to shift change.  Patient has rectal tube that was placed by wound care team d/t persistent diarrhea.  BLE with pitting edema.  This is not a new finding per patient.  Wound care assessed patient and placed orders for radiation burn that patient currently has.  Blood transfusion started per order and continues to infuse at this time.  Patient has no signs of transfusion reaction at this time.  Potassium phosphates infusion is still runnat at this time as well (due time to stop on MAR is incorrect).  Will continue to monitor patient closely.

## 2020-03-30 NOTE — THERAPY
"Occupational Therapy Treatment completed with focus on ADLs, patient education and upper extremity function.  Functional Status: intermittent Miguel sitting balance at EOB, ModA UB dress, MaxA LB dress, Miguel seated grooming, MaxAx2 sit>supine, TotalA toileting (rectal tube and wheatley cath)  Plan of Care: Will benefit from Occupational Therapy 4 times per week  Discharge Recommendations:  Equipment Will Continue to Assess for Equipment Needs. Post-acute therapy Recommend post-acute placement for additional occupational therapy services prior to discharge home.    See \"Rehab Therapy-Acute\" Patient Summary Report for complete documentation.     Pt seen for OT tx, found sitting up with PT, agreeable for additional functional activities seated EOB. Pt completed seated grooming, required cues and intermittent Miguel for balance. Pt required assist for dressing ADLs. Pt engaged in therapeutic exercise for bilateral biceps and shoulders with guided movements against gravity with rest breaks, fatigued quickly. Pt required maxAx2 to return to supine in bed. Pt is limited by weakness/fatigue, edema, and pain. Acute OT to continue to progress tolerance for activity and independence with ADLs.   "

## 2020-03-30 NOTE — PROGRESS NOTES
Progress Note    Author:  Pamela Interiano MD    Date & Time:   3/30/2020   7:48 AM          Patient ID:             Name:             Elle Barksdale   YOB: 1940  Age:                 80 y.o.  female   MRN:               [id number]    ________________________________________________________________________      Interval Events:       Patient given one unit PRBC yesterday  Appears comfortable  Currently no complaints           Exam:       Vitals:    03/30/20 0315   BP: 122/80   Pulse: 62   Resp: 18   Temp: 36.7 °C (98.1 °F)   SpO2: 93%     SpO2  Min: 90 %  Max: 100 %  O2 (LPM)  Min: 0  Max: 4  FiO2%  Min: 21 %  Max: 24 %  Arterial MAP   Min: 66 mmHg  Max: 91 mmHg  Temp  Min: 36.1 °C (97 °F)  Max: 38.2 °C (100.7 °F)    Intake/Output Summary (Last 24 hours) at 3/30/2020 0748  Last data filed at 3/30/2020 0700  Gross per 24 hour   Intake 1217.5 ml   Output 2600 ml   Net -1382.5 ml       DIET ORDERS (From admission to next 24h)     Start     Ordered    03/29/20 1302  Diet Tube Feeding  CONTINUOUS DIET     Comments:  Start Impact 1.5 @ 10 ml/hr and okay to advance to goal per protocol per MD   Question Answer Comment   Which Rate/Volume? 40 ml/hr    Formula: IMPACT PEPTIDE 1.5    Specify Type: CONTINUOUS        03/29/20 1301    03/26/20 1432  Diet NPO  ALL MEALS     Question:  Restrict to:  Answer:  Ice Chips  Comment:  single ice chips with RN only    03/26/20 1432                    Physical Exam  Nursing note reviewed.   Constitutional:       Appearance: Normal appearance. Alert, oriented x 4.NAD  HENT:      Head: Normocephalic and atraumatic.      Mouth/Throat:      Mouth: Mucous membranes are dry   Eyes:      Pupils: Pupils are equal, round, and reactive to light.      No scleral Icterus present  Cardiovascular:      Rate and Rhythm: Normal rate and regular rhythm. Extremities warm, 2+BUE/BLE edema  Pulmonary:      Effort: Pulmonary effort is normal.      Breath sounds:  No wheezes or  stridor  Abdominal:        Palpations: Abdomen is soft. Non-tender, non-distended.  Incision c/d/i, no erythema        Recent Labs     03/28/20  0534 03/29/20  0530 03/30/20  0359   SODIUM 151* 147* 151*   POTASSIUM 3.6 3.1* 3.2*   CHLORIDE 116* 116* 119*   CO2 27 26 24   BUN 16 17 19   CREATININE 0.50 0.39* 0.40*   MAGNESIUM 1.8 1.6 1.9   PHOSPHORUS 2.6 2.1* 2.8   CALCIUM 6.5* 6.7* 6.9*       Recent Labs     03/28/20  0534 03/29/20  0530 03/30/20  0359   ALTSGPT 12 11 14   ASTSGOT 10* 12 17   ALKPHOSPHAT 74 74 71   TBILIRUBIN 0.4 0.4 0.7   GLUCOSE 134* 120* 134*       Recent Labs     03/27/20  1205 03/28/20  0534 03/29/20  0530 03/30/20  0359   RBC 2.43* 2.10* 2.13* 2.72*   HEMOGLOBIN 7.5* 6.7* 6.7* 8.4*   HEMATOCRIT 26.1* 22.0* 22.4* 27.0*   PLATELETCT 85* 73* 80* 105*   IRON 36*  --   --   --    TOTIRONBC 84*  --   --   --        Recent Labs     03/28/20  0534 03/29/20  0530 03/30/20  0359   WBC 3.0* 3.1* 3.0*   NEUTSPOLYS 92.00* 87.70* 86.00*   LYMPHOCYTES 8.00* 5.30* 6.00*   MONOCYTES 0.00 2.60 4.00   EOSINOPHILS 0.00 1.70 1.00   BASOPHILS 0.00 0.00 0.00   ASTSGOT 10* 12 17   ALTSGPT 12 11 14   ALKPHOSPHAT 74 74 71   TBILIRUBIN 0.4 0.4 0.7         ________________________________________________________________________      Patient Active Problem List   Diagnosis   • Osteoarthritis   • Blood per rectum   • Rectal cancer (HCC)   • Septic shock (HCC)   • Perforation of intestine (HCC)   • Acute kidney injury (HCC)   • Hypokalemia   • Respiratory failure following trauma and surgery (HCC)   • Bacteremia due to Escherichia coli   • Thrombocytopenia (HCC)   • Hypernatremia   • Urinary retention   • Severe protein-calorie malnutrition (HCC)   • Anemia   • Hypophosphatemia   • Pancytopenia (HCC)     H/h up appropriately after transfusion  UOP good  Tolerating Tube feeds at goal+c    Plan:  CPM    PT/OT, Encourage IS/Pulmonary toilet    +SCDs/Lovenox

## 2020-03-30 NOTE — CONSULTS
Physical Medicine and Rehabilitation Consultation         Initial Consult      Date of Consultation: 3/30/2020  Consulting provider: Emory Wilson MD  Reason for consultation: assess for acute inpatient rehab appropriateness  LOS: 9 Day(s)      Chief complaint: Abdominal pain, septic shock, pneumatosis intestinalis of small intestines    HPI: The patient is a 80 y.o. 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  Patient had a vague abdominal pain that started 3 weeks prior to admission and worsened over the course of time becoming unbearable 2 days prior to admission.  Patient developed some shortness of breath and generalized weakness, also nausea, and started vomiting fecal material.  Patient became concerned and presented to the emergency department, found to be hypotensive and tachypneic.  Patient had leukopenia and bandemia, as well as acute kidney injury, lactic acidosis UA was positive for UTI, CT of the abdomen and pelvis showed small intestines with decompression distal and decompression of the colon consistent with small bowel obstruction with pneumatosis involving dilated loops concerning for bowel necrosis.     Patient underwent exploratory laparotomy with lysis of adhesions and repair of jejunal perforation and stricturoplasty by Dr. and Pratt SPARKS on 3/21/2020.  She was extubated on 3/22/2020.  She had positive blood cultures and urine cultures for E. coli which is pansensitive.  She was started on Zosyn for 8 days, hospital course complicated by hypernatremia, EMELINA, dysphasia requiring NG feeding tube, electrolyte abnormalities and pancytopenia.    The patient currently reports feeling weak/tired, but otherwise states she is feeling fine and the only difference is she's not working. I found Maricel  on my exam today and she denies having a history of this so we are getting an EKG to capture it.       ROS:  Pertinent positives are listed in HPI, all other systems reviewed and are negative      Social Hx:  Pre-morbidly, this patient lived in a single level home with Two  steps to enter, alone and able to care for self.   Employment: continues to work as a caregiver   Tobacco: denies   Alcohol: denies   Drugs: denies     Patient lives alone but has daughters who can help take care of her.     Current level of function:  The patient was evaluated by acute care Physical Therapy, Occupational Therapy and Speech Language Pathology; currently requiring maximal assistance for mobility and maximal assistance for ADLs, also with ongoing swallowing deficits.    Restrictions:  NPO    PMH:  Past Medical History:   Diagnosis Date   • Acute pain of left knee 10/26/2017   • Leg swelling 10/26/2017   • Osteoarthritis 6/11/2018   • Rectal cancer (HCC)    • Right knee injury 4/4/2013       PSH:  Past Surgical History:   Procedure Laterality Date   • PB EXPLORATORY OF ABDOMEN N/A 3/21/2020    Procedure: LAPAROTOMY, EXPLORATORY, lysis of adhesions, repair of perforation, stricteroplasty;  Surgeon: Pamela Interiano M.D.;  Location: SURGERY [facility];  Service: Gen Robotic   • CATARACT PHACO WITH IOL  1/13/2014    Performed by Samuel Vega M.D. at [facility]   • CATARACT PHACO WITH IOL  12/16/2013    Performed by Samuel Vega M.D. at [facility]   • APPENDECTOMY     • COLONOSCOPY WITH BIOPSY     • TONSILLECTOMY         FHX:  Family History   Problem Relation Age of Onset   • Hypertension Mother    • Cancer Father         colon   • No Known Problems Brother    • No Known Problems Daughter    • No Known Problems Daughter    • No Known Problems Daughter    • Diabetes Neg Hx    • Heart Disease Neg Hx    • Stroke Neg Hx        Medications:  Current Facility-Administered Medications   Medication Dose  "  • potassium bicarbonate (KLYTE) effervescent tablet 25 mEq  25 mEq   • D5W infusion     • oxyCODONE immediate-release (ROXICODONE) tablet 5 mg  5 mg    Or   • oxyCODONE immediate release (ROXICODONE) tablet 10 mg  10 mg   • omeprazole (FIRST-OMEPRAZOLE) 2 mg/mL oral susp 40 mg  40 mg   • Pharmacy Consult: Enteral tube insertion - review meds/change route/product selection  1 Each   • senna-docusate (PERICOLACE or SENOKOT S) 8.6-50 MG per tablet 2 Tab  2 Tab    And   • polyethylene glycol/lytes (MIRALAX) PACKET 1 Packet  1 Packet    And   • magnesium hydroxide (MILK OF MAGNESIA) suspension 30 mL  30 mL    And   • bisacodyl (DULCOLAX) suppository 10 mg  10 mg   • ondansetron (ZOFRAN ODT) dispertab 4 mg  4 mg   • carboxymethylcellulose (REFRESH TEARS) 0.5 % ophthalmic drops 1 Drop  1 Drop   • bacitracin ointment     • Respiratory Therapy Consult     • ondansetron (ZOFRAN) syringe/vial injection 4 mg  4 mg   • morphine (pf) 10 mg/mL injection 1-5 mg  1-5 mg       Allergies:  No Known Allergies    Physical Exam:  Vitals: /69   Pulse 74   Temp 36.3 °C (97.3 °F) (Temporal)   Resp 18   Ht 1.6 m (5' 3\")   Wt 76.8 kg (169 lb 5 oz)   SpO2 95%   Gen: NAD  Head: NC/AT  Eyes/ Nose/ Mouth: PERRLA, moist mucous membranes  Cardio: irregularly irregular HR, no mumurs  Pulm: CTAB, with normal respiratory effort  Abd: Soft NTND, active bowel sounds,   Ext: No peripheral edema. No calf tenderness. No clubbing/cyanosis.    Mental status:  A&Ox4 (person, place, date, situation) answers questions appropriately follows commands  Speech: fluent, no aphasia or dysarthria    CRANIAL NERVES:  2,3: visual acuity grossly intact, PERRL  3,4,6: EOMI bilaterally, no nystagmus or diplopia  5: sensation intact to light touch bilaterally and symmetric  7: no facial asymmetry  8: hearing grossly intact  9,10: symmetric palate elevation  11: SCM/Trapezius strength 4/5 bilaterally  12: tongue protrudes midline    Motor:      Upper Extremity  " Myotome R L   Shoulder flexion C5 4/5 4/5   Elbow flexion C5 4/5 4/5   Wrist extension C6 4/5 4/5   Elbow extension C7 4/5 4/5   Finger flexion C8 4/5 4/5   Finger abduction T1 4/5 4/5     Lower Extremity Myotome R L   Hip flexion L2 4/5 4/5   Knee extension L3 4/5 4/5   Ankle dorsiflexion L4 4/5 4/5   Toe extension L5 4/5 4/5   Ankle plantarflexion S1 4/5 4/5     Sensory:   intact to light touch through out  DTRs: 2+ in bilateral biceps, triceps, brachioradialis, 2+ in bilateral patellar and achilles tendons  No clonus at bilateral ankles  Negative babinski b/l  Negative Alvarez b/l     Tone: no spasticity noted, no cogwheeling noted    Labs: Reviewed and significant for   Recent Labs     03/28/20  0534 03/29/20  0530 03/30/20  0359   RBC 2.10* 2.13* 2.72*   HEMOGLOBIN 6.7* 6.7* 8.4*   HEMATOCRIT 22.0* 22.4* 27.0*   PLATELETCT 73* 80* 105*     Recent Labs     03/28/20  0534 03/29/20  0530 03/30/20  0359   SODIUM 151* 147* 151*   POTASSIUM 3.6 3.1* 3.2*   CHLORIDE 116* 116* 119*   CO2 27 26 24   GLUCOSE 134* 120* 134*   BUN 16 17 19   CREATININE 0.50 0.39* 0.40*   CALCIUM 6.5* 6.7* 6.9*     Recent Results (from the past 24 hour(s))   FOLATE    Collection Time: 03/30/20  3:59 AM   Result Value Ref Range    Folate -Folic Acid 9.6 >4.0 ng/mL   VITAMIN B12    Collection Time: 03/30/20  3:59 AM   Result Value Ref Range    Vitamin B12 -True Cobalamin 3314 (H) 211 - 911 pg/mL   MAGNESIUM    Collection Time: 03/30/20  3:59 AM   Result Value Ref Range    Magnesium 1.9 1.5 - 2.5 mg/dL   PHOSPHORUS    Collection Time: 03/30/20  3:59 AM   Result Value Ref Range    Phosphorus 2.8 2.5 - 4.5 mg/dL   Comp Metabolic Panel    Collection Time: 03/30/20  3:59 AM   Result Value Ref Range    Sodium 151 (H) 135 - 145 mmol/L    Potassium 3.2 (L) 3.6 - 5.5 mmol/L    Chloride 119 (H) 96 - 112 mmol/L    Co2 24 20 - 33 mmol/L    Anion Gap 8.0 7.0 - 16.0    Glucose 134 (H) 65 - 99 mg/dL    Bun 19 8 - 22 mg/dL    Creatinine 0.40 (L) 0.50 - 1.40  mg/dL    Calcium 6.9 (LL) 8.5 - 10.5 mg/dL    AST(SGOT) 17 12 - 45 U/L    ALT(SGPT) 14 2 - 50 U/L    Alkaline Phosphatase 71 30 - 99 U/L    Total Bilirubin 0.7 0.1 - 1.5 mg/dL    Albumin 1.6 (L) 3.2 - 4.9 g/dL    Total Protein 4.3 (L) 6.0 - 8.2 g/dL    Globulin 2.7 1.9 - 3.5 g/dL    A-G Ratio 0.6 g/dL   CBC WITH DIFFERENTIAL    Collection Time: 03/30/20  3:59 AM   Result Value Ref Range    WBC 3.0 (L) 4.8 - 10.8 K/uL    RBC 2.72 (L) 4.20 - 5.40 M/uL    Hemoglobin 8.4 (L) 12.0 - 16.0 g/dL    Hematocrit 27.0 (L) 37.0 - 47.0 %    MCV 99.3 (H) 81.4 - 97.8 fL    MCH 30.9 27.0 - 33.0 pg    MCHC 31.1 (L) 33.6 - 35.0 g/dL    RDW 63.7 (H) 35.9 - 50.0 fL    Platelet Count 105 (L) 164 - 446 K/uL    MPV 11.5 9.0 - 12.9 fL    Neutrophils-Polys 86.00 (H) 44.00 - 72.00 %    Lymphocytes 6.00 (L) 22.00 - 41.00 %    Monocytes 4.00 0.00 - 13.40 %    Eosinophils 1.00 0.00 - 6.90 %    Basophils 0.00 0.00 - 1.80 %    Nucleated RBC 0.00 /100 WBC    Neutrophils (Absolute) 2.67 2.00 - 7.15 K/uL    Lymphs (Absolute) 0.18 (L) 1.00 - 4.80 K/uL    Monos (Absolute) 0.12 0.00 - 0.85 K/uL    Eos (Absolute) 0.03 0.00 - 0.51 K/uL    Baso (Absolute) 0.00 0.00 - 0.12 K/uL    NRBC (Absolute) 0.00 K/uL    Macrocytosis 2+    ESTIMATED GFR    Collection Time: 03/30/20  3:59 AM   Result Value Ref Range    GFR If African American >60 >60 mL/min/1.73 m 2    GFR If Non African American >60 >60 mL/min/1.73 m 2   DIFFERENTIAL MANUAL    Collection Time: 03/30/20  3:59 AM   Result Value Ref Range    Bands-Stabs 3.00 0.00 - 10.00 %    Manual Diff Status PERFORMED    PERIPHERAL SMEAR REVIEW    Collection Time: 03/30/20  3:59 AM   Result Value Ref Range    Peripheral Smear Review see below    PLATELET ESTIMATE    Collection Time: 03/30/20  3:59 AM   Result Value Ref Range    Plt Estimation Decreased    MORPHOLOGY    Collection Time: 03/30/20  3:59 AM   Result Value Ref Range    RBC Morphology Present     Poikilocytosis 1+     Ovalocytes 1+     Toxic Gran Moderate      Dohle Bodies Few        Imaging:   Ct-abdomen-pelvis W/o    Result Date: 3/21/2020  3/21/2020 10:44 AM HISTORY/REASON FOR EXAM: Abdominal distention. TECHNIQUE/EXAM DESCRIPTION: CT scan of the abdomen and pelvis without contrast. Contrast-enhanced helical scanning was obtained from the diaphragmatic domes through the pubic symphysis without intravenous contrast. Low dose optimization technique was utilized for this CT exam including automated exposure control and adjustment of the mA and/or kV according to patient size. COMPARISON: No prior studies available. FINDINGS: The study is limited due to nonuse of intravenous contrast. CT Abdomen: There is a right lung base atelectasis/consolidation and a small right pleural effusion. There is fatty change of the liver. The gallbladder is distended. The spleen is normal. There are bilateral simple appearing renal cysts. No follow-up recommended. The adrenal glands are normal. The pancreas is normal. There is atherosclerotic vascular calcification. There is a small amount of perihepatic and perisplenic ascites. CT Pelvis: The stomach is distended. There are dilated loops of small intestine present. There is pneumatosis seen involving a dilated loop of small bowel. This appears to involve the jejunum. Appendix is not identified. There is a small amount of free fluid dependently within the pelvis. There is degenerative change of the hips.     1.  Dilated small intestine with decompression distally and decompression of the colon consistent with small bowel obstruction. There is pneumatosis involving dilated loops of small intestine consistent with bowel necrosis/ischemia. 2.  Small amount of ascites. 3.  Right lung base atelectasis/consolidation and small right pleural effusion. This was discussed with ALIYAH TOSCANO at below the 6:00 AM on 3/21/2020.    [id number] View    Result Date: 3/24/2020  3/24/2020 4:05 PM HISTORY/REASON FOR EXAM: NG tube placement TECHNIQUE/EXAM  DESCRIPTION AND NUMBER OF VIEWS: 1 supine views of the abdomen. COMPARISON: None FINDINGS: There is no evidence of bowel obstruction. NG tube tip overlies the gastric body. No abnormal calcifications are seen.     NG tube tip overlies the gastric body.    Dx-chest-portable (1 View)    Result Date: 3/23/2020  3/23/2020 5:07 AM HISTORY/REASON FOR EXAM:  post extubation. TECHNIQUE/EXAM DESCRIPTION AND NUMBER OF VIEWS: Single portable view of the chest. COMPARISON: 3/22/2020 FINDINGS: The cardiomediastinal silhouette is unchanged. There has been interval removal of the endotracheal tube. Remaining lines and tubes are stably positioned.     1.  Interval extubation. 2.  Stable mild left basilar atelectasis and/or consolidation.    Dx-chest-portable (1 View)    Result Date: 3/22/2020  3/22/2020 6:29 AM HISTORY/REASON FOR EXAM: Respiratory failure. TECHNIQUE/EXAM DESCRIPTION AND NUMBER OF VIEWS: Single portable view of the chest. COMPARISON: 3/21/2020 FINDINGS: LUNGS: Mild left basilar atelectasis. No new consolidation. No significant effusions. PNEUMOTHORAX: None. LINES AND TUBES: Well-positioned ETT. Right IJ central catheter is in stable position. NGT tip is in the stomach. MEDIASTINUM: Stable cardiac silhouette. MUSCULOSKELETAL STRUCTURES: Unchanged.     1. Interval intubation, with well-positioned lines and tubes. 2. Mild left basilar atelectasis. No new consolidation or significant pleural effusions.     Dx-chest-portable (1 View)    Result Date: 3/21/2020  3/21/2020 10:04 AM HISTORY/REASON FOR EXAM: Shortness of breath TECHNIQUE/EXAM DESCRIPTION AND NUMBER OF VIEWS: Single portable view of the chest. COMPARISON: 3/21/2020 FINDINGS: There is a right IJ central line with the tip overlying the cavoatrial junction. No evidence of pneumothorax. There is no evidence of focal consolidation or evidence of pulmonary edema. There is no pleural effusion. The heart is enlarged.     1.  Interval placement of a right IJ central  line without evidence of complication. 2.  Cardiomegaly.    Dx-chest-portable (1 View)    Result Date: 3/21/2020  3/21/2020 9:02 AM HISTORY/REASON FOR EXAM: Shortness of breath TECHNIQUE/EXAM DESCRIPTION AND NUMBER OF VIEWS: Single portable view of the chest. COMPARISON: None FINDINGS: There is no evidence of focal consolidation or evidence of pulmonary edema. There is no pleural effusion. The heart is borderline enlarged.     Borderline cardiomegaly.    Us-extremity Venous Lower Bilat    Result Date: 3/17/2020   Vascular Laboratory  CONCLUSIONS  No eveidence of deep venous thrombosis in right or left lower etremity  ERNESTO CORDERO  Exam Date:     2020 14:29  Room #:     Out Patient  Priority:     Stat  Ht (in):             Wt (lb):  Ordering Physician:        ALESSANDRO BRITT  Referring Physician:       [id number] Allison  Sonographer:               Phyllis Cormier RVT  Study Type:                Complete Bilateral  Technical Quality:         Adequate  Age:    80    Gender:     F  MRN:    [id number]  :    1940      BSA:  Indications:     Edema, Localized swelling, mass and lump, lower limb,                   bilateral  CPT Codes:       55126  ICD Codes:       782.3  R22.43  History:         Bilateral edema/swelling mild; No previous exam on file.  Limitations:  PROCEDURES:  Bilateral lower extremity venous duplex imaging.  The following venous structures were evaluated: common femoral, profunda  femoral, greater saphenous, femoral, popliteal , peroneal and posterior  tibial veins.  Serial compression, augmentation maneuvers,  color and spectral Doppler  flow evaluations were performed.  FINDINGS:  Bilateral lower extremities -  Complete color filling and compressibility with normal venous flow dynamics  including spontaneous flow, response to augmentation maneuvers, and  respiratory phasicity.  The peroneal and posterior tibial veins are difficult to assess for  compressibility, but flow response to  augmentation is demonstrated.  Allen Sharp M.D.  (Electronically Signed)  Final Date:      17 March 2020                   17:07    Dx-abdomen For Tube Placement    Result Date: 3/27/2020  3/27/2020 10:05 PM HISTORY/REASON FOR EXAM:  Line evaluation. TECHNIQUE/EXAM DESCRIPTION AND NUMBER OF VIEWS:  1 view(s) of the abdomen. COMPARISON:  3/27/2020 at 11:38 AM. FINDINGS: Enteric feeding tube tip terminates over the expected region of the first portion of the duodenum.     Enteric feeding tube tip terminates over the expected region of the first portion of the duodenum.    Dx-abdomen For Tube Placement    Result Date: 3/27/2020  3/27/2020 11:37 AM HISTORY/REASON FOR EXAM:  Tube evaluation. TECHNIQUE/EXAM DESCRIPTION AND NUMBER OF VIEWS:  1 view(s) of the abdomen. COMPARISON:  None. FINDINGS: Limited single view of the abdomen performed primarily to evaluate enteric tube position. The tip projects over the expected area of the second portion duodenum. The bowel gas pattern is within normal limits.     Feeding tube with tip projecting over the expected area of the second portion duodenum.      ASSESSMENT:  Patient is a 80 y.o. female admitted with severe debility s/p ex-lap for SBO and with necrotic bowel. Patient is globally weak. She is able to produce resisted strength but she is very weak. 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 She is still requiring Max asst with PT/OT and has several ongoing medical issues including hypokalemia, hypernatremia and anemia.     Rehabilitation: Impaired ADLs and mobility  Patient is a good candidate for inpatient rehab based on needs for PT, OT, and speech therapy.  Patient will also benefit from family training.  Patient has a good discharge situation which will be home with daughters.   Barriers to transfer include: Insurance authorization, TCCs to verify disposition, medical clearance and bed  availability     [facility] Code / Diagnosis to Support: 16 Debility (Non Cardiac, Non Pulmonary)    Perforated intestines, bowel ischemia, small bowel obstruction  -CT scan of abdomen pelvis suggestive SBO with pneumatosis involving small bowels concerning for ischemia or necrosis  -Patient underwent ex lap surgery on 3/21/2020  -Continue PT OT and speech therapy while in-house    Irregular heart rate   - new issue   - EKG being completed now, so far showing PACs and PVCs and not A.fib. Technician will continue to observe for a few minutes and give report to primary team.     Hypernatremia  - Sodium currently 151, max level 160 on 3/25/2020  - Tolerating tube feed high can and status post IV fluid hydration with D5W    Anemia  - Hemoglobin currently 8.4, up from 6.7 yesterday  - Likely blood loss anemia in the setting of surgery, infection  - 1 unit packed red blood cells given 3/29/2020    Hypokalemia  -Potassium currently 3.2  -Requiring K-Lyte 25 mEq twice daily    Pain  -Roxicodone 5 to 10 mg every 4 hours as needed  -Morphine injection 1 to 5 mg every hour PRN IV    Bowel program  - rectal tube in place with large amount of stool in bag    DVT Prophylaxis:   -SCDs      Discussed with pt, summarized hospitalization and care, options for next step of care  Labs reviewed    Discussed with team about recommendations     Thank you for allowing us to participate in the care of this patient.       Discussed with patient about recommendations for and plan for rehabilitation as follows. Patient with multiple co-morbidities(including but not limited to anemia, hypernatremia, hypokalemia); with swallowing deficits and functional deficits in mobility/self-care, and Severe de-conditioning.     Pre-morbidly, this patient lived in a two level home with Two  steps to enter, alone and able to care for self. The patient was evaluated by acute care Physical Therapy, Occupational Therapy and Speech Language Pathology; currently  requiring maximal assistance for mobility and maximal assistance for ADLs, also with ongoing swallowing deficits.     The patient is a(n) Very Good candidate for an acute inpatient rehabilitation program with a coordinated program of care at an intensity and frequency not available at a lower level of care.     Note: if patient continues to progress while waiting for medical clearance, and no longer requires 2 of of 3 therapy services (PT/OT/SLP) at a CGA/Minimal assistance level, patient will no longer need acute inpatient rehabilitation.    This recommendation is substantiated by the patient's current medical condition with intervention and assessment of medical issues requiring an acute level of care for patient's safety and maximum outcome. A coordinated program of care will be provided by an interdisciplinary team including physical therapy, occupational therapy, speech language pathology, hospitalist and rehab nursing. Rehab goals include improved swallowing, mobility, self-care management, strength and conditioning/endurance, pain management, bowel and bladder management, mood and affect, and safety with independent home management including caregiver training.     Estimated length of stay is approximately 14 days. Rehab potential: Very Good. Disposition: to pre-morbid independent living setting with supportive care of patient's family. We will continue to follow with you in anticipation of discharge to acute inpatient rehabilitation when medically stable to do so at the discretion of her  attending physician. Thank you for allowing us to participate in her care. Please call with any questions regarding this recommendation.    Patient was seen for 110 minutes on unit/floor of which > 50% of time was spent on counseling and coordination of care regarding the above, including prognosis, risk reduction, benefits of treatment, and options for next stage of care.      Lizandro Zarate, DO   Physical Medicine and  Rehabilitation

## 2020-03-30 NOTE — THERAPY
"Physical Therapy Treatment completed.   Bed Mobility:  Supine to Sit: Maximal Assist  Transfers: Sit to Stand: Maximal Assist  Gait: Level Of Assist: Unable to Participate     Plan of Care: Will benefit from Physical Therapy 4 times per week  Discharge Recommendations: Equipment: Will Continue to Assess for Equipment Needs. Post-acute therapy: Recommend post-acute placement for continued physical therapy services prior to discharge home.       See \"Rehab Therapy-Acute\" Patient Summary Report for complete documentation.     Patient continues to be limited by functional weakness and decreased activity tolerance. Today she required max A for bed mobility and sit to stand. She demonstrated difficulty with anterior weight shift and extension to stand, once over JACKIE she was better able to maintain standing balance and required decreased assist. Recommend mobilization to EOB with RN staff at least 1x/day to progress activity tolerance. Continue to recommend post acute placement and will continue to follow.  "

## 2020-03-30 NOTE — CARE PLAN
Problem: Communication  Goal: The ability to communicate needs accurately and effectively will improve  Outcome: PROGRESSING AS EXPECTED     Problem: Safety  Goal: Will remain free from injury  Outcome: PROGRESSING AS EXPECTED  Goal: Will remain free from falls  Outcome: PROGRESSING AS EXPECTED     Problem: Bowel/Gastric:  Goal: Normal bowel function is maintained or improved  Outcome: PROGRESSING AS EXPECTED     Problem: Pain Management  Goal: Pain level will decrease to patient's comfort goal  Outcome: PROGRESSING AS EXPECTED     Problem: Respiratory:  Goal: Respiratory status will improve  Outcome: PROGRESSING AS EXPECTED

## 2020-03-31 NOTE — PROGRESS NOTES
Hospital Medicine Daily Progress Note    Date of Service  3/31/2020    Chief Complaint  80 y.o. female admitted 3/21/2020 with abdominal pain    Hospital Course    80 y.o. female with a h/o rectal cancer s/p chemoradiation who presented 3/21/2020 with worsening abdominal pain.  CT of the abdomen and pelvis showed small bowel obstruction with pneumatosis involving dilated loops concerning for bowel necrosis.  She was admitted to the ICU and started on pressors.  She underwent exploratory laparotomy and small bowel resection on 3/21/2020 with Dr. Inteirano.  She was extubated on 3/22/20.  She had positive blood and urine cultures for E. Coli, pansensitive.  She was given zosyn x 8 days.  Her course was c/b hypernatremia, EMELINA, dysphagia requiring NG feeding tube, electrolyte abnormalities, pancytopenia.       Interval Problem Update  - No acute events overnight, afebrile for 48 hours, sodium improving 147  - Speech therapy following for dysphagia   - Still having large stool output and Low urinary output , renal function OK    -----> tried contacting patient daughter multiple times, unsuccessful     Consultants/Specialty  Internal Medicine  Surgery (primary)    Code Status  FULL    Disposition  Rehab when medically clear    Review of Systems  Review of Systems   Constitutional: Positive for malaise/fatigue. Negative for chills and fever.   HENT:        Dry lips/mouth   Eyes: Negative for blurred vision.   Respiratory: Negative for cough, hemoptysis and shortness of breath.    Cardiovascular: Negative for chest pain.   Gastrointestinal: Positive for diarrhea. Negative for abdominal pain, blood in stool, constipation, melena, nausea and vomiting.   Genitourinary: Negative for hematuria.   Musculoskeletal: Positive for myalgias. Negative for falls.   Neurological: Positive for weakness. Negative for dizziness and headaches.   Endo/Heme/Allergies: Does not bruise/bleed easily.   Psychiatric/Behavioral: Negative for substance  abuse.        Physical Exam  Temp:  [36.2 °C (97.2 °F)-36.9 °C (98.4 °F)] 36.5 °C (97.7 °F)  Pulse:  [71-97] 95  Resp:  [16-18] 18  BP: (106-113)/(59-70) 108/66  SpO2:  [92 %-100 %] 97 %    Physical Exam  Vitals signs and nursing note reviewed.   Constitutional:       General: She is not in acute distress.     Appearance: She is ill-appearing. She is not toxic-appearing or diaphoretic.   HENT:      Head: Normocephalic and atraumatic.      Nose:      Comments: cortrak in place     Mouth/Throat:      Mouth: Mucous membranes are dry.   Eyes:      General: No scleral icterus.     Conjunctiva/sclera: Conjunctivae normal.   Neck:      Musculoskeletal: Normal range of motion and neck supple.      Comments: Central line RIJ c/d/i  Cardiovascular:      Rate and Rhythm: Normal rate and regular rhythm.      Heart sounds: No murmur. No friction rub. No gallop.    Pulmonary:      Effort: Pulmonary effort is normal.      Breath sounds: Normal breath sounds.   Abdominal:      General: Bowel sounds are normal. There is no distension.      Palpations: Abdomen is soft.      Tenderness: There is no abdominal tenderness.      Comments: Abdominal incision with suture clean/dry and intact   Musculoskeletal:      Comments: anasarca   Skin:     Comments: Extremities warmer today   Neurological:      General: No focal deficit present.      Mental Status: She is alert and oriented to person, place, and time.      Motor: No weakness.   Psychiatric:         Mood and Affect: Mood normal.         Behavior: Behavior normal.         Fluids    Intake/Output Summary (Last 24 hours) at 3/31/2020 1559  Last data filed at 3/31/2020 1521  Gross per 24 hour   Intake 2766.25 ml   Output 3975 ml   Net -1208.75 ml       Laboratory  Recent Labs     03/29/20  0530 03/30/20  0359 03/31/20  0440   WBC 3.1* 3.0* 2.8*   RBC 2.13* 2.72* 2.59*   HEMOGLOBIN 6.7* 8.4* 7.9*   HEMATOCRIT 22.4* 27.0* 26.1*   .2* 99.3* 100.8*   MCH 31.5 30.9 30.5   MCHC 29.9*  31.1* 30.3*   RDW 66.2* 63.7* 64.4*   PLATELETCT 80* 105* 119*   MPV 11.7 11.5 11.1     Recent Labs     03/30/20  0359 03/30/20  1830 03/31/20  0440 03/31/20  1428   SODIUM 151* 150* 148* 147*   POTASSIUM 3.2*  --  2.8* 3.2*   CHLORIDE 119*  --  117* 116*   CO2 24  --  23 22   GLUCOSE 134*  --  115* 111*   BUN 19  --  20 19   CREATININE 0.40*  --  0.39* 0.36*   CALCIUM 6.9*  --  6.9* 7.0*                   Imaging  DX-ABDOMEN FOR TUBE PLACEMENT   Final Result      Enteric feeding tube tip terminates over the expected region of the first portion of the duodenum.      DX-ABDOMEN FOR TUBE PLACEMENT   Final Result         Feeding tube with tip projecting over the expected area of the second portion duodenum.      [id number] VIEW   Final Result      NG tube tip overlies the gastric body.      DX-CHEST-PORTABLE (1 VIEW)   Final Result      1.  Interval extubation.   2.  Stable mild left basilar atelectasis and/or consolidation.      DX-CHEST-PORTABLE (1 VIEW)   Final Result         1. Interval intubation, with well-positioned lines and tubes.   2. Mild left basilar atelectasis. No new consolidation or significant pleural effusions.         CT-ABDOMEN-PELVIS W/O   Final Result      1.  Dilated small intestine with decompression distally and decompression of the colon consistent with small bowel obstruction. There is pneumatosis involving dilated loops of small intestine consistent with bowel necrosis/ischemia.      2.  Small amount of ascites.      3.  Right lung base atelectasis/consolidation and small right pleural effusion.      This was discussed with ALIYAH TOSCANO at below the 6:00 AM on 3/21/2020.      DX-CHEST-PORTABLE (1 VIEW)   Final Result      1.  Interval placement of a right IJ central line without evidence of complication.      2.  Cardiomegaly.      DX-CHEST-PORTABLE (1 VIEW)   Final Result      Borderline cardiomegaly.           Assessment/Plan  * Perforation of intestine (HCC)- (present on  "admission)  Assessment & Plan  - CT scan of abdominal pelvis suggested bowel obstruction with pneumatosis involving the small bowels concerning for ischemia or necrosis.    - S/p ex lap with small bowel resection 3/21/20 with Dr. Alfred   ----> Noted to have large output from rectal tube , thus c-dif study was obtained which has been -ve .   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      Anemia- (present on admission)  Assessment & Plan  Likely in setting of surgery, infection  Macrocytic  S/p 1 unit pRBC 3/29/20 for persistently low H/H (although stable, slightly low BPs)-->increased 8.4    Severe protein-calorie malnutrition (HCC)- (present on admission)  Assessment & Plan  Nutrition following  Cortak in place , will discuss with dietary if  We can make any change in tube feed to decrease stool output    Urinary retention- (present on admission)  Assessment & Plan  De [place] Garza in place  Can hopefully remove and re-trial voiding when more mobile    Thrombocytopenia (HCC)- (present on admission)  Assessment & Plan  I likely 2/2 sepsis, ctm      Hypokalemia- (present on admission)  Assessment & Plan  Will replete as needed    Septic shock (HCC)- (present on admission)  Assessment & Plan  \"Resolved  This is Septic shock Present on admission  SIRS criteria identified on my evaluation include: Tachycardia, with heart rate greater than 90 BPM, Tachypnea, with respirations greater than 20 per minute and Leukopenia, with WBC less than 4,000  Source is Intra abdominal   Presentation includes: Severe " "sepsis present and initial Lactate level result is >= 4 mmol/L.   Despite appropriate fluid resuscitation with crystalloid given per sepsis guidelines, the patient remains hypotensive with systolic blood pressure less than 90 or MAP less than 65  Hemodynamic support with additional fluids and IV vasopressors as needed to maintain a SBP of 90 or MAP of 65  IV antibiotics as appropriate for source of sepsis  Reassessment: I have reassessed the patient's hemodynamic status\"    S/p IV Zosyn for 7 days for E-coli bacteremia  Urine cx growing E-coli   Culture from bowel surgery NGTD  Not sure the source is bowel vs UTI, still low wbc         Sacral wound  Assessment & Plan  Large sacral wound  Wound following, rectal tube in place to assist with healing    Pancytopenia (HCC)  Assessment & Plan  Likely in setting of acute severe illness  CTM    Hypophosphatemia  Assessment & Plan  Will replete and treat as needed     Respiratory failure following trauma and surgery (HCC)- (present on admission)  Assessment & Plan  Resolved  On RA    Acute kidney injury (HCC)- (present on admission)  Assessment & Plan  Resolved  Likely secondary to sepsis and septic shock.  Fluid and hemodynamics resuscitation  Avoid nephrotoxins  Renal dose all medication       VTE prophylaxis: Lovenox      "

## 2020-03-31 NOTE — THERAPY
"Physical Therapy Treatment completed.   Bed Mobility:  Supine to Sit: Maximal Assist  Transfers: Sit to Stand: Refused  Gait: Level Of Assist:Refused with No Equipment Needed       Plan of Care: Will benefit from Physical Therapy 4 times per week  Discharge Recommendations: Equipment: Will Continue to Assess for Equipment Needs. Post-acute therapy Discharge to a transitional care facility for continued skilled therapy services.    Pt reporting she had a \"bad night\" and was unwilling to attempt standing today. However, she was agreeable to sit EOB but required max A to roll and then from sidely to sit to prevent shearing of sacral wound. In sitting, pt able to initiate shoulders anteriorly to bias trunk in more anterior position. She could sustain with B UE support pulling on PT. Mostly, pt required mod A to remain in seated position. Overall duration seated limited by generalized discomfort and fatigue. While here, PT will follow to address limited activity tolerance, instability, and fall risk. Recommend placement.     See \"Rehab Therapy-Acute\" Patient Summary Report for complete documentation.       "

## 2020-03-31 NOTE — PROGRESS NOTES
Pt is A&O 4  Pain controlled at this time on current regimen   Denies nausea, pt receiving nutrition via cortrac to R nare, impact peptide 1.5 running at 40, which is goal.  Pt receiving frequent free water flushes, tolerating well  Incision to abdomen CDI, open to air, well approximated with staples  Rectal tube in place, large amount of watery brown/yellow, liquid stool, to manage wound to sacrum  Dressing CDI, scant tan drainage  + Voids per wheatley catheter, placed for retention  + flatus  Up max assist  SCD's on  Bed alarm on, pt high fall risk per blanche houston  Reviewed plan of care with patient, bed in lowest position and locked, pt resting comfortably now, call light within reach, all needs met at this time. Interventions will be executed per plan of care

## 2020-03-31 NOTE — PROGRESS NOTES
2 RN skin check complete with Yumiko CANDELARIO    Pt's skin is fragile and edematous throughout.  Pt has cortrak to R nare, secured with tape, dressing CDI  RIJ present, dressing CDI  Slight blanchable redness under L breast  Pt has midline incision, closed with staples, open to air, no drainage, well approximated  Sacrum has large wound, dressing in place to upper portion, CDI.  Lower wound open to air, tan drainage to dry ana lilia pads underneath, these were changed  Rectal tube in place, output is watery brown, to wheatley bag no leakage around site, flowing well  Wheatley catheter in place, no abnormalities to perineal area, stat lock to L thigh  Upper extremities bilaterally have 1-2+ edema, elevated on pillows  Lower extremities have 2-3+ pitting edema  Heels are bilaterally pink, blanchable.  Pt has mepilex, heel float boots in place  Slight pink to R medial side of foot, mepilex placed over, continues to have heel float boots in place    Pt has SCD's bilaterally, heel float boots in place, low air loss mattress, receiving Q2 turns, powder to groin and under breasts.

## 2020-03-31 NOTE — PROGRESS NOTES
Wally from Lab called with critical result of Calcium 6.9 at 0540. Critical lab result read back to Wally.   Dr. Surendra SPARKS notified of critical lab result at 0605.  Critical lab result read back by Dr. Agosto.    Also notified on K 2.8     Received orders for 40mEq of K IV over four hours

## 2020-03-31 NOTE — DISCHARGE PLANNING
This RN CM will verify if Pt is medically cleared for discharge to [facility]. Kathy Sorensen is following the case and will coordinate with her on bed availability.

## 2020-03-31 NOTE — ASSESSMENT & PLAN NOTE
Large sacral wound  Wound following, rectal tube  For comfort as patient is noted to have e lots of BM   Q 2 turn, now on comfort care

## 2020-03-31 NOTE — RESPIRATORY CARE
COPD EDUCATION by COPD CLINICAL EDUCATOR  3/31/2020 at 6:04 AM by Kaylee Shirley, RRT     Patient reviewed by COPD education team. Patient does not have a history or diagnosis of COPD and is a non-smoker, therefore does not qualify for the COPD program.

## 2020-03-31 NOTE — CARE PLAN
Problem: Communication  Goal: The ability to communicate needs accurately and effectively will improve  Outcome: PROGRESSING AS EXPECTED     Problem: Safety  Goal: Will remain free from injury  Outcome: PROGRESSING AS EXPECTED     Problem: Pain Management  Goal: Pain level will decrease to patient's comfort goal  Outcome: PROGRESSING AS EXPECTED     Problem: Respiratory:  Goal: Respiratory status will improve  Outcome: PROGRESSING AS EXPECTED     Problem: Skin Integrity  Goal: Risk for impaired skin integrity will decrease  Outcome: PROGRESSING AS EXPECTED     Problem: Mobility  Goal: Risk for activity intolerance will decrease  Outcome: PROGRESSING AS EXPECTED

## 2020-03-31 NOTE — PROGRESS NOTES
Progress Note    Author:  Pamela Interiano MD    Date & Time:   3/31/2020   12:04 PM          Patient ID:             Name:             Elle Barksdale   YOB: 1940  Age:                 80 y.o.  female   MRN:               [id number]    ________________________________________________________________________      Interval Events:         Patient denies abdominal pain  Still having large amount of liquid stool       Exam:       Vitals:    03/31/20 0734   BP: 108/66   Pulse: 95   Resp: 18   Temp: 36.2 °C (97.2 °F)   SpO2: 97%     SpO2  Min: 90 %  Max: 100 %  O2 (LPM)  Min: 0  Max: 4  FiO2%  Min: 21 %  Max: 24 %  Temp  Min: 36.1 °C (97 °F)  Max: 38.2 °C (100.7 °F)    Intake/Output Summary (Last 24 hours) at 3/31/2020 1204  Last data filed at 3/31/2020 0800  Gross per 24 hour   Intake 2306.25 ml   Output 3025 ml   Net -718.75 ml       DIET ORDERS (From admission to next 24h)     Start     Ordered    03/29/20 1302  Diet Tube Feeding  CONTINUOUS DIET     Comments:  Start Impact 1.5 @ 10 ml/hr and okay to advance to goal per protocol per MD   Question Answer Comment   Which Rate/Volume? 40 ml/hr    Formula: IMPACT PEPTIDE 1.5    Specify Type: CONTINUOUS        03/29/20 1301    03/26/20 1432  Diet NPO  ALL MEALS     Question:  Restrict to:  Answer:  Ice Chips  Comment:  single ice chips with RN only    03/26/20 1432                    Physical Exam  Nursing note reviewed.   Constitutional:       Appearance: Normal appearance. NAD  HENT:      Head: Normocephalic and atraumatic.      Mouth/Throat:      Mouth: Mucous membranes are dry   Eyes:      Pupils: Pupils are equal, round, and reactive to light.      No scleral Icterus present  Cardiovascular:      Rate and Rhythm: Normal rate and regular rhythm. Extremities warm  Pulmonary:      Effort: Pulmonary effort is normal.      Breath sounds: o wheezes or stridor  Abdominal:      General: Abdomen is flat.    Palpations: Abdomen is soft. Non-tender, non-distended.      Comments:   Neurological:      Mental Status: lethargic, oriented x 3.           Recent Labs     03/29/20  0530 03/30/20 0359 03/30/20  1830 03/31/20  0440   SODIUM 147* 151* 150* 148*   POTASSIUM 3.1* 3.2*  --  2.8*   CHLORIDE 116* 119*  --  117*   CO2 26 24  --  23   BUN 17 19  --  20   CREATININE 0.39* 0.40*  --  0.39*   MAGNESIUM 1.6 1.9  --   --    PHOSPHORUS 2.1* 2.8  --  2.2*   CALCIUM 6.7* 6.9*  --  6.9*       Recent Labs     03/29/20 0530 03/30/20 0359 03/31/20 0440   ALTSGPT 11 14  --    ASTSGOT 12 17  --    ALKPHOSPHAT 74 71  --    TBILIRUBIN 0.4 0.7  --    PREALBUMIN  --  6.3*  --    GLUCOSE 120* 134* 115*       Recent Labs     03/29/20 0530 03/30/20 0359 03/31/20 0440   RBC 2.13* 2.72* 2.59*   HEMOGLOBIN 6.7* 8.4* 7.9*   HEMATOCRIT 22.4* 27.0* 26.1*   PLATELETCT 80* 105* 119*       Recent Labs     03/29/20 0530 03/30/20 0359 03/31/20 0440   WBC 3.1* 3.0* 2.8*   NEUTSPOLYS 87.70* 86.00* 81.90*   LYMPHOCYTES 5.30* 6.00* 13.80*   MONOCYTES 2.60 4.00 3.40   EOSINOPHILS 1.70 1.00 0.00   BASOPHILS 0.00 0.00 0.00   ASTSGOT 12 17  --    ALTSGPT 11 14  --    ALKPHOSPHAT 74 71  --    TBILIRUBIN 0.4 0.7  --          ________________________________________________________________________      Patient Active Problem List   Diagnosis   • Osteoarthritis   • Blood per rectum   • Rectal cancer (HCC)   • Septic shock (HCC)   • Perforation of intestine (HCC)   • Acute kidney injury (HCC)   • Hypokalemia   • Respiratory failure following trauma and surgery (HCC)   • Bacteremia due to Escherichia coli   • Thrombocytopenia (HCC)   • Hypernatremia   • Urinary retention   • Severe protein-calorie malnutrition (HCC)   • Anemia   • Hypophosphatemia   • Pancytopenia (HCC)   • Sacral wound     Diarrhea; Possibly secondary to recent chemo/radiation  Plan:  Check stool for c-diff, if negative then will start lomotil  Monitor and replete electrolytes

## 2020-03-31 NOTE — WOUND TEAM
[facility]  Inpatient Services  Wound and Skin Care Progress Note    Admission Date: 3/21/2020     Last order of IP CONSULT TO WOUND CARE was found on 3/21/2020 from Hospital Encounter on 3/21/2020     HPI, PMH, SH: Reviewed    Unit where seen by Wound Team: [id number]     WOUND CONSULT/FOLLOW UP RELATED TO:  Buttocks wounds      Self Report / Pain Level:  No complaints of pain        OBJECTIVE:  On PRADIP bed, still with frequent incontinent stool.  BMS in place.  Dr. Interiano and Hospitalist OK'd rectal tube.  RN thinks stool will subside now that softeners/laxatives stopped.     WOUND TYPE, LOCATION, CHARACTERISTICS (Pressure Injuries: location, stage, POA or date identified)             Wound 03/21/20 Radiation Buttocks;Rectum;Sacrum;Coccyx;Groin full thickness to sacrum/coccyx, partial thickness to buttocks (Active)   Wound Image       Site Assessment Red;Yellow    Periwound Assessment Denuded    Margins Unattached edges    Closure None    Drainage Amount Small    Drainage Description Serosanguineous    Treatments Autolytic Debridement;Cleansed;Site care    Wound Cleansing Normal Saline Irrigation    Periwound Protectant Stoma Powder;Skin Protectant Wipes to Periwound    Dressing Cleansing/Solutions Not Applicable    Dressing Options Hydrocolloid Thick    Dressing Changed Changed    Dressing Status Clean;Dry;Intact    Dressing Change/Treatment Frequency Every 48 hrs, and As Needed    NEXT Dressing Change/Treatment Date 04/05/20    NEXT Weekly Photo (Inpatient Only) 04/04/20    Non-staged Wound Description Full thickness    Wound Length (cm) 11 cm    Wound Width (cm) 11 cm    Wound Surface Area (cm^2) 121 cm^2    WOUND NURSE ONLY - Time Spent with Patient (mins) 60        Vascular:    ARACELI:   No results found.    Lab Values:    Lab Results   Component Value Date/Time    WBC 2.8 (L) 03/31/2020 04:40 AM    RBC 2.59 (L) 03/31/2020 04:40 AM    HEMOGLOBIN 7.9 (L) 03/31/2020 04:40 AM    HEMATOCRIT 26.1 (L)  03/31/2020 04:40 AM    CREACTPROT 14.08 (H) 03/30/2020 03:59 AM      Culture Results show:  Recent Results (from the past 720 hour(s))   CULTURE WOUND W/ GRAM STAIN    Collection Time: 03/21/20  2:16 PM   Result Value Ref Range    Significant Indicator NEG     Source WND     Site Peritoneal Fluid     Culture Result No growth at 72 hours.     Gram Stain Result Few WBCs.  No organisms seen.          INTERVENTIONS BY WOUND TEAM:  Patient helped to turn self to left side with CNA.  Removed previous dressing, cleansed with NS and gauze.  Nasal trumpet as rectal tube working well at this time.  Wrapped port with pillow case.  Crusted wound and kody-wound with no sting and stoma powder.  Paste ring placed below coccyx in attempt to seal distal area of dressing and protect from any stool leakage.  Covered with sacral hydrocolloid and attempted to seal with VAC drape at distal end of dressing.  Pillow placed to left side.  Heel float boots in place.         Interdisciplinary consultation: Patient, Bedside RN (Jazmine)     EVALUATION: patient with rectal cancer.  Admitted with SBO, micro perf.  NG in place.  Having pain and issues with frequent watery stool.  Dr. Interiano ok'd rectal tube, hopefully will subside in 1-2 days.     Goals: Steady decrease in wound area and depth weekly.    NURSING PLAN OF CARE ORDERS (X):    Dressing changes: See Dressing Care orders: X  Skin care: See Skin Care orders: X  Rectal tube care: See Rectal Tube Care orders:   Other orders:    WOUND TEAM PLAN OF CARE:   Dressing changes by wound team:          Follow up 1-2 times weekly:             X Sunday/monday   Follow up 3 times weekly:                NPWT change 3 times weekly:     Follow up as needed:       Other (explain):     Anticipated discharge plans:   LTACH:        SNF/Rehab:                  Home Care:         Will need ongoing wound care post DC  Outpatient Wound Center:            Self Care:

## 2020-03-31 NOTE — CARE PLAN
Problem: Safety  Goal: Will remain free from falls  Outcome: PROGRESSING AS EXPECTED  Note: Pt high fall risk per blanche houston, pt has bed alarm in place, pt does not attempt unsafe ambulation, all other appropriate interventions in place     Problem: Skin Integrity  Goal: Risk for impaired skin integrity will decrease  Outcome: PROGRESSING AS EXPECTED  Note: Pt has Q2 turns in place, wheatley catheter, rectal tube, on low air loss mattress.  All other appropriate interventions in place

## 2020-03-31 NOTE — CARE PLAN
Problem: Nutritional:  Goal: Nutrition support tolerated and meeting greater than 85% of estimated needs  Outcome: MET    Per nursing progress note dated 3/31, tube feed is at goal rate of 40 ml/hour.    RD will continue to monitor.

## 2020-03-31 NOTE — DISCHARGE PLANNING
[facility] Transitional [facility] Coordination     Physiatry consult complete.  Dr. Zarate recommending appropriate for inpatient rehab.      Call out to [facility] liaison requesting consideration for IRF level care.    Call out to daughter Nerissa Barksdale to verify discharge support/disposition.  No answer.  Left voice message request for return call to [facility].      [facility] following for medical clearance/auth determination.   update to T4 CM [phone].

## 2020-04-01 NOTE — PROGRESS NOTES
2 RN skin check complete with Yanna CANDELARIO     Pt's skin is fragile and edematous throughout.  Pt has cortrak to R nare, secured with tape, dressing CDI  RIJ present, dressing CDI  Pt has slight blanchable redness to elbows bilaterally, mepilex applied, no break in skin integrity  Pt has midline incision, closed with staples, open to air, no drainage, well approximated  Sacrum has large wound, dressing in place to upper portion, CDI.  Lower wound open to air, tan drainage to dry ana lilia pads underneath, these were changed  Rectal tube in place, output is watery tan/brown, to wheatley bag no leakage around site, flowing well.  Replaced pillow case around tubing d/t drainage present.  Now clean.  Wheatley catheter in place, slight edema to perineal area, stat lock to L thigh  Small wound to R groin in skin fold, barrier cream placed.  Upper extremities bilaterally have 1-2+ edema, elevated on pillows  Lower extremities have 2-3+ pitting edema  Heels are bilaterally pink, blanchable.  Pt has mepilex to both  Slight pink to R and L lateral sides of feet, removed heel float boots, heels floated on pillows     Pt has SCD's bilaterally, heel float boots in place, low air loss mattress, receiving Q2 turns, barrier cream to groin

## 2020-04-01 NOTE — THERAPY
"Pt willing to participate w/ therapy.  She needs max assist to move supine to eob, where she is able to maintain for only several seconds, due to posterior loss of balance.  She did agree to attempt standing, which she was able to perform twice, w/ max assist.  She is able to maintain standing for 60 seconds before fatiguing.  Attempted weight shifting while standing.  Pt unable.  Returned to bed and positioned for comfort.     Physical Therapy Treatment completed.   Bed Mobility:  Supine to Sit: Maximal Assist  Transfers: Sit to Stand: Maximal Assist  Gait: Level Of Assist: Unable to Participate with No Equipment Needed       Plan of Care: Will benefit from Physical Therapy 4 times per week  Discharge Recommendations: Equipment: Will Continue to Assess for Equipment Needs. Post-acute therapy  Recommend post-acute placement for continued physical therapy services prior to discharge home.     See \"Rehab Therapy-Acute\" Patient Summary Report for complete documentation.       "

## 2020-04-01 NOTE — PROGRESS NOTES
Hospital Medicine Daily Progress Note    Date of Service  4/1/2020    Chief Complaint  80 y.o. female admitted 3/21/2020 with abdominal pain    Hospital Course    80 y.o. female with a h/o rectal cancer s/p chemoradiation who presented 3/21/2020 with worsening abdominal pain.  CT of the abdomen and pelvis showed small bowel obstruction with pneumatosis involving dilated loops.   She was admitted to the ICU and started on pressors.  She underwent exploratory laparotomy and small bowel resection on 3/21/2020 with Dr. Interiano.  She was extubated on 3/22/20.  She had positive blood and urine cultures for E. Coli, pansensitive.  She was given zosyn x 8 days.     Patient then transferred to surgical floor, surgery continue to follow the patient.  She is noted to have increased amount of output from her rectal tube, C. difficile study has been checked and noted to be negative this is started on Imodium as per Dr. Interiano recommendation.  Also palliative consult has been placed after discussing with the patient daughter who is also working here as a respiratory therapist.       Interval Problem Update  - No acute events overnight, afebrile for 48 hours, electrolytes will be repleted as needed.  Patient continues to receive free water flushes through core track and receiving D5 at 50 cc/hr---> will increase to 75 cc/h.  BMP every 12 hours.  -Speech therapy has evaluated the patient and recommended okay to have sips of nectar and ice chips with RN supervision.  -Plan of care has been discussed in interdisciplinary meeting and bedside RN      Consultants/Specialty  Internal Medicine  Surgery (primary)    Code Status  FULL    Disposition  Rehab when medically clear    Review of Systems  Review of Systems   Constitutional: Negative for chills, fever and malaise/fatigue.   HENT:        Dry lips/mouth   Eyes: Negative for blurred vision.   Respiratory: Negative for cough and shortness of breath.    Cardiovascular: Negative for chest  pain and palpitations.   Gastrointestinal: Positive for diarrhea. Negative for abdominal pain, blood in stool, constipation, melena, nausea and vomiting.   Genitourinary: Negative for hematuria.   Musculoskeletal: Positive for myalgias. Negative for falls.   Neurological: Positive for weakness. Negative for dizziness and headaches.   Endo/Heme/Allergies: Does not bruise/bleed easily.   Psychiatric/Behavioral: Negative for substance abuse.        Physical Exam  Temp:  [36.2 °C (97.1 °F)-36.7 °C (98 °F)] 36.2 °C (97.1 °F)  Pulse:  [] 100  Resp:  [18-19] 19  BP: (102-133)/(63-78) 102/63  SpO2:  [93 %-97 %] 93 %    Physical Exam  Vitals signs and nursing note reviewed.   Constitutional:       General: She is not in acute distress.     Appearance: She is ill-appearing. She is not toxic-appearing or diaphoretic.   HENT:      Head: Normocephalic and atraumatic.      Nose:      Comments: cortrak in place     Mouth/Throat:      Mouth: Mucous membranes are dry.   Eyes:      General: No scleral icterus.     Extraocular Movements: Extraocular movements intact.      Conjunctiva/sclera: Conjunctivae normal.   Neck:      Musculoskeletal: Normal range of motion and neck supple.      Comments: Central line RIJ c/d/i  Cardiovascular:      Rate and Rhythm: Normal rate and regular rhythm.      Heart sounds: No murmur. No friction rub. No gallop.    Pulmonary:      Effort: Pulmonary effort is normal.      Breath sounds: Normal breath sounds.   Abdominal:      General: Bowel sounds are normal. There is no distension.      Palpations: Abdomen is soft. There is no mass.      Tenderness: There is no abdominal tenderness.      Comments: Abdominal incision with suture clean/dry and intact   Musculoskeletal:      Comments: anasarca   Skin:     Comments: Extremities warmer today   Neurological:      General: No focal deficit present.      Mental Status: She is alert and oriented to person, place, and time.      Motor: No weakness.    Psychiatric:         Mood and Affect: Mood normal.         Behavior: Behavior normal.       Noted to have dry mouth    Fluids    Intake/Output Summary (Last 24 hours) at 4/1/2020 1410  Last data filed at 4/1/2020 0805  Gross per 24 hour   Intake 1130 ml   Output 3550 ml   Net -2420 ml       Laboratory  Recent Labs     03/30/20  0359 03/31/20  0440 04/01/20  1230   WBC 3.0* 2.8* 2.5*   RBC 2.72* 2.59* 2.59*   HEMOGLOBIN 8.4* 7.9* 8.1*   HEMATOCRIT 27.0* 26.1* 25.6*   MCV 99.3* 100.8* 98.8*   MCH 30.9 30.5 31.3   MCHC 31.1* 30.3* 31.6*   RDW 63.7* 64.4* 65.1*   PLATELETCT 105* 119* 145*   MPV 11.5 11.1 10.8     Recent Labs     03/31/20  0440 03/31/20  1428 04/01/20  1230   SODIUM 148* 147* 148*   POTASSIUM 2.8* 3.2* 2.9*   CHLORIDE 117* 116* 117*   CO2 23 22 21   GLUCOSE 115* 111* 133*   BUN 20 19 20   CREATININE 0.39* 0.36* 0.40*   CALCIUM 6.9* 7.0* 7.0*                   Imaging  DX-ABDOMEN FOR TUBE PLACEMENT   Final Result      Enteric feeding tube tip terminates over the expected region of the first portion of the duodenum.      DX-ABDOMEN FOR TUBE PLACEMENT   Final Result         Feeding tube with tip projecting over the expected area of the second portion duodenum.      [id number] VIEW   Final Result      NG tube tip overlies the gastric body.      DX-CHEST-PORTABLE (1 VIEW)   Final Result      1.  Interval extubation.   2.  Stable mild left basilar atelectasis and/or consolidation.      DX-CHEST-PORTABLE (1 VIEW)   Final Result         1. Interval intubation, with well-positioned lines and tubes.   2. Mild left basilar atelectasis. No new consolidation or significant pleural effusions.         CT-ABDOMEN-PELVIS W/O   Final Result      1.  Dilated small intestine with decompression distally and decompression of the colon consistent with small bowel obstruction. There is pneumatosis involving dilated loops of small intestine consistent with bowel necrosis/ischemia.      2.  Small amount of ascites.      3.   Right lung base atelectasis/consolidation and small right pleural effusion.      This was discussed with ALIYAH TOSCANO at below the 6:00 AM on 3/21/2020.      DX-CHEST-PORTABLE (1 VIEW)   Final Result      1.  Interval placement of a right IJ central line without evidence of complication.      2.  Cardiomegaly.      DX-CHEST-PORTABLE (1 VIEW)   Final Result      Borderline cardiomegaly.           Assessment/Plan  * Perforation of intestine (HCC)- (present on admission)  Assessment & Plan  - CT scan of abdominal pelvis suggested bowel obstruction with pneumatosis involving the small bowels concerning for ischemia or necrosis.    - S/p ex lap with small bowel resection 3/21/20 with Dr. Alfred   ----> Noted to have large output from rectal tube , thus c-dif study was obtained which has been -ve .   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        Anemia- (present on admission)  Assessment & Plan  Likely in setting of surgery, infection  Macrocytic  S/p 1 unit pRBC 3/29/20 for persistently low H/H (although stable, slightly low BPs)-->increased 8.4    Severe protein-calorie malnutrition (HCC)- (present on admission)  Assessment & Plan  Nutrition following  Cortak in place , dietary consult  Placed if there are any change in tube feed to decrease stool output    Urinary retention- (present on admission)  Assessment & Plan  De [place] Garza in place  Can hopefully remove and re-trial voiding when more mobile    Thrombocytopenia (HCC)- (present on  "admission)  Assessment & Plan  I likely 2/2 sepsis, not actvely bleeding , will ctm      Hypokalemia- (present on admission)  Assessment & Plan  Will replete as needed    Septic shock (HCC)- (present on admission)  Assessment & Plan  \"Resolved  This is Septic shock Present on admission  SIRS criteria identified on my evaluation include: Tachycardia, with heart rate greater than 90 BPM, Tachypnea, with respirations greater than 20 per minute and Leukopenia, with WBC less than 4,000  Source is Intra abdominal   Presentation includes: Severe sepsis present and initial Lactate level result is >= 4 mmol/L.   Despite appropriate fluid resuscitation with crystalloid given per sepsis guidelines, the patient remains hypotensive with systolic blood pressure less than 90 or MAP less than 65  Hemodynamic support with additional fluids and IV vasopressors as needed to maintain a SBP of 90 or MAP of 65  IV antibiotics as appropriate for source of sepsis  Reassessment: I have reassessed the patient's hemodynamic status\"    S/p IV Zosyn for 7 days for E-coli bacteremia  Urine cx growing E-coli   Culture from bowel surgery NGTD  Not sure the source is bowel vs UTI, still low wbc  At 2.5        Sacral wound  Assessment & Plan  Large sacral wound  Wound following, rectal tube in place to assist with healing    Pancytopenia (HCC)  Assessment & Plan  Likely in setting of acute severe illness  CTM    Hypophosphatemia  Assessment & Plan  Will replete and treat as needed     Respiratory failure following trauma and surgery (HCC)- (present on admission)  Assessment & Plan  Resolved  On RA    Acute kidney injury (HCC)- (present on admission)  Assessment & Plan  Resolved  Likely secondary to sepsis and septic shock.  Fluid and hemodynamics resuscitation  Avoid nephrotoxins  Renal dose all medication       VTE prophylaxis: Lovenox      "

## 2020-04-01 NOTE — DISCHARGE PLANNING
Follow up for rehab chart review, current documentation does not support tolerance to IRF level of care anticipate skilled nursing when medically cleared, Potential for transition skilled to rehab.

## 2020-04-01 NOTE — THERAPY
"Speech Language Therapy dysphagia treatment completed.     Functional Status: Patient was seen on this date for dysphagia treatment. Dry blood on upper and lower lip minimally reduced with careful oral care. Patient with flat affect, ongoing generalized weakness, but agreeable to small amounts of PO. Vocal quality clear but reduced in intensity. PO consisted of 5-6 single ice chips, 3 oz (tsp/cup), <1 oz pudding, and 2 tsp of unthickened water. Pt declined further PO trials or trials of other foods offered. Onset of swallow trigger was minimally delayed. With trials of thin liquids, patient had an immediate coughing response to second trial with persistent throat clearing thereafter concerning for aspiration. No overt s/sx of aspiration with all other consistencies tested but reported pudding was too thick and reported globus sensation x1 followed by belching x2 with MTL after ~3 oz. Education provided to pt regarding current status and SLP recs.     Recommendations: 1) Continue cortrak for nutrition, 2) Pt is ok to have sips of nectars and ice chips with RN supervision as tolerated    Plan of Care: Will benefit from Speech Therapy 3 times per week    Post-Acute Therapy: Recommend inpatient transitional care services for continued speech therapy services.      See \"Rehab Therapy-Acute\" Patient Summary Report for complete documentation.     "

## 2020-04-01 NOTE — THERAPY
"Occupational Therapy Treatment completed with focus on ADLs.  Functional Status:  Max A x2 supine <> sit, Max A (x2 for safety) sit > stand, Total A toileting and LB dressing  Plan of Care: Will benefit from Occupational Therapy 4 times per week  Discharge Recommendations:  Equipment Will Continue to Assess for Equipment Needs. Post-acute therapy Recommend post-acute placement for additional occupational therapy services prior to discharge home.    See \"Rehab Therapy-Acute\" Patient Summary Report for complete documentation.     Pt seen for OT tx session. Pt's dtr present throughout session and wanted to assist pt w/ grooming and bathing. Seated EOB pt reported that she hurt \"all over\" RN aware. Pt demo'd impaired balance, functional mobility activity tolerance and generalized weakness impacting functional independence. Recommend post acute placement. Will continue to follow while in house.   "

## 2020-04-01 NOTE — CARE PLAN
Problem: Skin Integrity  Goal: Risk for impaired skin integrity will decrease  Outcome: PROGRESSING AS EXPECTED     Problem: Bowel/Gastric:  Goal: Normal bowel function is maintained or improved  Outcome: PROGRESSING SLOWER THAN EXPECTED

## 2020-04-01 NOTE — DIETARY
"Nutrition services: Day 11 of admit.  Elle Barksdale is a 80 y.o. female with admitting DX of Septic shock, Pneumatosis intestinalis.     Consult received for excessive BMs with request to evaluate for possible changes to tube feed to alleviate this.       Assessment:  Height: 160 cm (5' 3\")(drivers license)  Weight: 76.8 kg (169 lb 5 oz)  Weight to Use in Calculations: 72.1 kg (158 lb 15.2 oz)  Ideal Body Weight: 54.4 kg (120 lb)  Percent Ideal Body Weight: 132.5  Body mass index is 29.99 kg/m²., BMI classification: overweight  TF: Impact Peptide 1.5 at 40 ml/hour currently at goal rate.     Evaluation:   1. Current tube feed is peptide-based and formulated for pt's at risk for GI intolerance. Pt now receiving Imodium with slight improvement noted per MD progress note. Per nurse, pt had output of 1 Liter yesterday and is between 500 ml and 1 liter output today. Current tube feed is most appropriate formula based on pt's dx. If pt continues to have excessive output through the rectal tube, MD may want to consider additional medical management.      Recommendations/Plan:  1. Continue with Impact Peptide 1.5 at 40 ml/hour as ordered.  2. Additional fluids per MD discretion.  3. Imodium or alternate medical management of large output from rectal tube per MD.   4. Monitor weight.  5. RD will follow.            "

## 2020-04-02 PROBLEM — J93.9 PNEUMOTHORAX: Status: ACTIVE | Noted: 2020-01-01

## 2020-04-02 NOTE — PROGRESS NOTES
2 RN skin check complete with Nathaly Madrigalrak to R nare with tape securing, CDI  Bottom lip scabbed  Right IJ, dressing CDI  Preventative mepliex to elbows, skin intact  Interdry under breast, skin intact  Midline incision approximated with staples JONATHAN.  Right lower quadrant crease excoriation with interdry in place.  Wheatley catheter in place- wheatley care provided.   Small wound to Left groin/ thigh area JONATHAN  Large sacral wound, large amount of purulent drainage. Dressing changed. Blanchable errythemia in the periwound   Rectal tube in place, draining. Pillow case around tube with drainage present from sacral wound- changed.   BL heels red and blanching with mepilex in place.  Fragile and edematous skin throughout.    Low air loss mattress in use, Q2 turns, barrier cream to groin

## 2020-04-02 NOTE — PROGRESS NOTES
Lab called with critical result of Hgb at 6.2. Critical lab result read back to lab.   Dr. Sinha notified of critical lab result at 0900.  Critical lab result read back by Dr. Sinha. Orders for repeat CBC and COD    1100 Repeat Hgb resulted as 7.3  MD updated and given no further orders at this time.

## 2020-04-02 NOTE — PROGRESS NOTES
Progress Note    Author:  Pamela Interiano MD    Date & Time:   4/2/2020   1:19 PM          Patient ID:             Name:             Elle Barksdale   YOB: 1940  Age:                 80 y.o.  female   MRN:               [id number]    ________________________________________________________________________      Interval Events:         Discussed with patient's daughter Nerissa concern that Elle is not absorbing nutrients, still having large amount watery stool 2-3L daily  Imodium increased to 16mg/day        Exam:       Vitals:    04/02/20 1150   BP: (!) 92/52   Pulse: 89   Resp: (!) 25   Temp: 36.5 °C (97.7 °F)   SpO2: 99%     SpO2  Min: 91 %  Max: 100 %  O2 (LPM)  Min: 0  Max: 4  FiO2%  Min: 21 %  Max: 24 %  Temp  Min: 36.1 °C (97 °F)  Max: 37.2 °C (98.9 °F)    Intake/Output Summary (Last 24 hours) at 4/2/2020 1319  Last data filed at 4/2/2020 1200  Gross per 24 hour   Intake 3118.33 ml   Output 4600 ml   Net -1481.67 ml       DIET ORDERS (From admission to next 24h)     Start     Ordered    03/29/20 1302  Diet Tube Feeding  CONTINUOUS DIET     Comments:  Start Impact 1.5 @ 10 ml/hr and okay to advance to goal per protocol per MD   Question Answer Comment   Which Rate/Volume? 40 ml/hr    Formula: IMPACT PEPTIDE 1.5    Specify Type: CONTINUOUS        03/29/20 1301    03/26/20 1432  Diet NPO  ALL MEALS     Question:  Restrict to:  Answer:  Ice Chips  Comment:  single ice chips with RN only    03/26/20 1432                    Physical Exam  Nursing note reviewed.   Constitutional:       Appearance: .NAD    Eyes:      Pupils: Pupils are equal, round, and reactive to light.      No scleral Icterus present  Cardiovascular:      Rate and Rhythm: Normal rate and regular rhythm. Extremities warm, 2+ BUE/BLE edema  Pulmonary:      Effort: Pulmonary effort is normal.      Breath sounds: No wheezes or stridor  Abdominal:      General: Abdomen is flat. Soft  Non-tender  Midline incision c/d/i no  erythema    Recent Labs     03/31/20  0440  04/01/20  1230 04/01/20  2300 04/02/20  0815   SODIUM 148*   < > 148* 146* 143   POTASSIUM 2.8*   < > 2.9* 3.1* 3.1*   CHLORIDE 117*   < > 117* 116* 113*   CO2 23   < > 21 22 22   BUN 20   < > 20 19 19   CREATININE 0.39*   < > 0.40* 0.30* 0.29*   MAGNESIUM  --   --  1.6  --   --    PHOSPHORUS 2.2*  --  2.4*  --   --    CALCIUM 6.9*   < > 7.0* 6.9* 7.0*    < > = values in this interval not displayed.       Recent Labs     04/01/20 1230 04/01/20 2300 04/02/20  0815   GLUCOSE 133* 115* 113*       Recent Labs     04/01/20  1230 04/02/20  0815 04/02/20  0940   RBC 2.59* 1.99* 2.35*   HEMOGLOBIN 8.1* 6.2* 7.3*   HEMATOCRIT 25.6* 20.3* 23.4*   PLATELETCT 145* 164 152*       Recent Labs     04/01/20  1230 04/02/20  0815 04/02/20  0940   WBC 2.5* 2.4* 2.2*   NEUTSPOLYS 84.20* 86.70* 83.30*   LYMPHOCYTES 12.30* 7.10* 8.60*   MONOCYTES 0.90 4.40 4.30   EOSINOPHILS 0.00 0.00 0.90   BASOPHILS 0.00 0.00 0.00         ________________________________________________________________________      Patient Active Problem List   Diagnosis   • Osteoarthritis   • Blood per rectum   • Rectal cancer (HCC)   • Septic shock (HCC)   • Perforation of intestine (HCC)   • Acute kidney injury (HCC)   • Hypokalemia   • Respiratory failure following trauma and surgery (HCC)   • Bacteremia due to Escherichia coli   • Thrombocytopenia (HCC)   • Hypernatremia   • Urinary retention   • Severe protein-calorie malnutrition (HCC)   • Anemia   • Hypophosphatemia   • Pancytopenia (HCC)   • Sacral wound       Plan:  Aggressive medical management for diarrhea, malabsorption likely secondary to recent chemo/radiation therapy  H/h slowly dropping, likely secondary to chronic disease and bone marrow suppression.  Monitor, transfuse to keep Hb >7.0

## 2020-04-02 NOTE — ASSESSMENT & PLAN NOTE
Patient is noted to be short of breath, chest x-ray ordered---> noted to have right basilar pneumothorax.  Chest x-ray repeated after 12 hours--->  Noted to have stable pneumothorax. Will make sure patient oxygenate 100% .     ---> repeat chest X-ray showed  Resolution of pneumothorax

## 2020-04-02 NOTE — CARE PLAN
Problem: Safety  Goal: Will remain free from injury  Outcome: PROGRESSING AS EXPECTED     Problem: Fluid Volume:  Goal: Will maintain balanced intake and output  Outcome: PROGRESSING AS EXPECTED

## 2020-04-02 NOTE — PROGRESS NOTES
Bedside report received.  Assessment complete.  A&O x 4. Patient calls appropriately.  Patient ip with max assist. Bed alarm on, pt high fall risk.   Patient has 3/10 pain.   Denies N&V. Tolerating tube feeds via cortrak at 40ml/hr.   Surgical midline incision JONATHAN with staples, CDI. 300mL free water flushes Q4.   + void, + flatus, + loose BM via rectal tube  Patient denies SOB.  SCD's on.  Q2 turns in place.  Review plan with of care with patient. Call light and personal belongings with in reach. Hourly rounding in place. All needs met at this time.

## 2020-04-02 NOTE — PROGRESS NOTES
2 RN skin check complete with PJ RN     Pt's skin is fragile and edematous throughout.  Pt has cortrak to R nare, secured with tape, dressing CDI  RIJ present, dressing CDI  Pt mepilex applied to bilateral elbows, no break in skin integrity  Slight blanchable redness under breasts, placed interdry  Pt has midline incision, closed with staples, open to air, no drainage, well approximated  Sacrum has large wound, dressing in place to upper portion, CDI.  Lower wound open to air, tan drainage to dry ana lilia pads underneath, these were changed  Rectal tube in place, output is watery tan/brown, to wheatley bag no leakage around site, flowing well.  Tube came out while turning pt, tube replaced with lubrication.  Replaced pillow case around tubing d/t drainage present.  Now clean.  Wheatley catheter in place, slight edema to perineal area, stat lock to L thigh  Small wound to R groin in skin fold, placed interdry  Upper extremities bilaterally have 1-2+ edema, elevated on pillows  Lower extremities have 2-3+ pitting edema  Heels are bilaterally pink, blanchable.  Pt has mepilex to both  Slight pink to R and L lateral sides of feet, heels floated on pillows     Pt has SCD's bilaterally, low air loss mattress, receiving Q2 turns, barrier cream to groin, and interdry sheets to groin and under breasts.

## 2020-04-02 NOTE — PROGRESS NOTES
Sukhjinder from Lab called with critical result of Calcium 6.9 at 2355. Critical lab result read back to Sukhjinder.   Dr. Celeste notified of critical lab result at 0008.  Critical lab result read back by Dr. Celeste.    Received orders to give 0600 dose of potassium bicarbonate now.

## 2020-04-02 NOTE — CARE PLAN
Problem: Safety  Goal: Will remain free from falls  Outcome: PROGRESSING AS EXPECTED  Fall precautions in place.   Problem: Venous Thromboembolism (VTW)/Deep Vein Thrombosis (DVT) Prevention:  Goal: Patient will participate in Venous Thrombosis (VTE)/Deep Vein Thrombosis (DVT)Prevention Measures  Outcome: PROGRESSING AS EXPECTED   SCDs in use, Pharmaceutical prevention in place

## 2020-04-02 NOTE — THERAPY
Physical Therapy Contact Note    PT treatment attempted. Patient declined OOB activity, reported intractable pain. RN aware. Will reattempt as appropriate and able.    Carlee Mercado, PT, DPT  [phone]

## 2020-04-02 NOTE — PROGRESS NOTES
Pt is A&O 4  Pain controlled at this time on current regimen   Denies nausea, pt receiving nutrition via cortrac to R nare, impact peptide 1.5 running at 40, which is goal.  Pt receiving Q4 300cc free water flushes  Incision to abdomen CDI, open to air, well approximated with staples  Rectal tube in place, large amount of watery brown/yellow, liquid stool, to manage wound to sacrum  Dressing CDI, scant tan drainage  + Voids per wheatley catheter, placed for retention  + flatus  Up max assist  SCD's on  Bed alarm on, pt high fall risk per blanche houston  Reviewed plan of care with patient, bed in lowest position and locked, pt resting comfortably now, call light within reach, all needs met at this time. Interventions will be executed per plan of care

## 2020-04-02 NOTE — CONSULTS
"Reason for PC Consult: Advance Care Planning    Consulted by: Dr. Riley    Assessment:  General: 80 year old female admitted with abdominal pain. Diagnosed with small bowel obstruction was in the ICU on the vent with pressors.  S/P exp lap and small bowel resection 3/21/20.  Pt extubated 3/22/20.  Positive blood and urine cultures.  Pt with rectal tube for frequent watery stool.  Cortrak for dysphagia only sips of nectar and ice chips per SLP. Hypernatremia improving,  anemia, malnutrition, urinary retention has wheatley, thrombocytopenia, sepsis, sacral wound, and EMELINA.   Past medical history of rectal cancer 1/2020, s/p chemo and radiation.  Plan was for surgery.   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  Pt has 3 daughters Nerissa Del Cid and Jackie.   Dyspnea: No-  Room Air  Last BM: 04/02/20-    Pain: No-  Denies but is very tired  Depression: Mood appropriate for situation-  \"My goal would be to go home\"  Dementia: No;       Spiritual:  Is Jewish or spirituality important for coping with this illness? Yes-    Has a  or spiritual provider visit been requested? No  has invited pt.  \"They have already been here\"    Palliative Performance Scale: 40%    Advance Directive: None-  Daughter Nerissa states pt has an advance directive naming the pt's eldest daughter Maria Eugenia as the health care agent.  Nerissa is not aware of what else the document contains.  Pt also states she can't recall the contents.  DPOA:  - NOK three daughters Maria Eugenia Multani and Jackie   POLST: No-      Code Status: Full-  Discussed with pt.  Pt is going to consider resuscitation status.  Will follow tomorrow and continue discussion.    Outcome:  Nerissa pt's daughter at bedside (Nerissa is an RT at [employer]).  Introduced self and the role of Palliative Care.  Pt wake but reports being tired.  \"So much going on today\".  Pt tiffanie pain.  Pt's ultimate goal is to go home.  Pt reports " "she needs to have surgery for her cancer but it is unclear if this is a possibility.  Discussed pt's current clinical condition.  Reviewed pt's current full code status.  Pt not sure about resuscitation wants to think about it.  Asked if pt wanted this writer to contact her daughter Maria Eugenia pt declined, \"not now\".  Pt agrees to have additional discussion about goals and plan of care tomorrow. Support and encouragement provided.  All questions answered. Nerissa provided with Palliative Care contact information.    Updated: Dr. Riley, Kristin JORDAN, Shelby CANDELARIO, Dr. Chappell    Plan: Nerissa will bringing advance directives.   Will readdress code status tomorrow.  Will also continue discussion regarding goals of care and the plan of care including the option of hospice.    Thank you for allowing Palliative Care to participate in this patient's care. Please feel free to call [phone] with any questions or concerns.  "

## 2020-04-02 NOTE — PROGRESS NOTES
[facility] Medicine Daily Progress Note    Date of Service  4/2/2020    Chief Complaint  80 y.o. female admitted 3/21/2020 with abdominal pain    Hospital Course    80 y.o. female with a h/o rectal cancer s/p chemoradiation who presented 3/21/2020 with worsening abdominal pain.  CT of the abdomen and pelvis showed small bowel obstruction with pneumatosis involving dilated loops.   She was admitted to the ICU and started on pressors.  She underwent exploratory laparotomy and small bowel resection on 3/21/2020 with Dr. Interiano.  She was extubated on 3/22/20.  She had positive blood and urine cultures for E. Coli, pansensitive.  She was given zosyn x 8 days.     Patient then transferred to surgical floor, surgery continue to follow the patient.  She is noted to have increased amount of output from her rectal tube, C. difficile study has been checked and noted to be negative this is started on Imodium as per Dr. Interiano recommendation.  Also palliative consult has been placed after discussing with the patient daughter who is also working here as a respiratory therapist.       Interval Problem Update  - No acute events overnight,  Will replete electrolytes as needed   -Speech therapy has evaluated the patient and recommended okay to have sips of nectar and ice chips with RN supervision.  -Plan of care has been discussed in interdisciplinary meeting and bedside RN    ----> Chest x-ray obtained as patient is noted to be short of breath noted to have a small right basilar pneumothorax, repeat chest x-ray obtained after 2 hours---> pneumothorax remained stable. Will obtain repeat chest X-ray     ---->  Noted to have low blood pressure, but she is noted to be alert and oriented, denied any lightheadedness associated with this.    Also plan of care has been explained to the patient daughter, patient.  Palliative consulted, palliative care and evaluated the patient.      Consultants/Specialty  Internal Medicine  Surgery  (primary)    Code Status  FULL    Disposition  Rehab when medically clear    Review of Systems  Review of Systems   Constitutional: Negative for chills, fever and malaise/fatigue.   HENT:        Dry lips/mouth   Eyes: Negative for blurred vision.   Respiratory: Negative for cough and shortness of breath.    Cardiovascular: Negative for chest pain and palpitations.   Gastrointestinal: Positive for diarrhea. Negative for abdominal pain, blood in stool, constipation, melena, nausea and vomiting.   Genitourinary: Negative for hematuria.   Musculoskeletal: Positive for myalgias. Negative for falls.   Neurological: Positive for weakness. Negative for dizziness and headaches.   Endo/Heme/Allergies: Does not bruise/bleed easily.   Psychiatric/Behavioral: Negative for substance abuse.        Physical Exam  Temp:  [36.4 °C (97.6 °F)-36.8 °C (98.3 °F)] 36.5 °C (97.7 °F)  Pulse:  [87-99] 89  Resp:  [17-25] 25  BP: ()/(50-59) 92/52  SpO2:  [92 %-99 %] 99 %    Physical Exam  Vitals signs and nursing note reviewed.   Constitutional:       General: She is not in acute distress.     Appearance: She is ill-appearing. She is not toxic-appearing or diaphoretic.   HENT:      Head: Normocephalic and atraumatic.      Nose:      Comments: cortrak in place     Mouth/Throat:      Mouth: Mucous membranes are dry.   Eyes:      General: No scleral icterus.     Extraocular Movements: Extraocular movements intact.      Conjunctiva/sclera: Conjunctivae normal.   Neck:      Musculoskeletal: Normal range of motion and neck supple.      Comments: Central line RIJ c/d/i  Cardiovascular:      Rate and Rhythm: Normal rate and regular rhythm.      Heart sounds: No murmur. No friction rub. No gallop.    Pulmonary:      Effort: Pulmonary effort is normal.      Breath sounds: Normal breath sounds.   Abdominal:      General: Bowel sounds are normal. There is no distension.      Palpations: Abdomen is soft. There is no mass.      Tenderness: There is no  abdominal tenderness.      Comments: Abdominal incision with suture clean/dry and intact   Musculoskeletal:      Comments: anasarca   Skin:     Comments: Extremities warmer today   Neurological:      General: No focal deficit present.      Mental Status: She is alert and oriented to person, place, and time.      Motor: No weakness.   Psychiatric:         Mood and Affect: Mood normal.         Behavior: Behavior normal.       Noted to have dry mouth    Fluids    Intake/Output Summary (Last 24 hours) at 4/2/2020 1514  Last data filed at 4/2/2020 1200  Gross per 24 hour   Intake 3118.33 ml   Output 4000 ml   Net -881.67 ml       Laboratory  Recent Labs     04/01/20  1230 04/02/20  0815 04/02/20  0940   WBC 2.5* 2.4* 2.2*   RBC 2.59* 1.99* 2.35*   HEMOGLOBIN 8.1* 6.2* 7.3*   HEMATOCRIT 25.6* 20.3* 23.4*   MCV 98.8* 101.5* 99.6*   MCH 31.3 30.7 31.1   MCHC 31.6* 30.2* 31.2*   RDW 65.1* 64.5* 65.0*   PLATELETCT 145* 164 152*   MPV 10.8 11.0 10.7     Recent Labs     04/01/20  1230 04/01/20  2300 04/02/20  0815   SODIUM 148* 146* 143   POTASSIUM 2.9* 3.1* 3.1*   CHLORIDE 117* 116* 113*   CO2 21 22 22   GLUCOSE 133* 115* 113*   BUN 20 19 19   CREATININE 0.40* 0.30* 0.29*   CALCIUM 7.0* 6.9* 7.0*                   Imaging  DX-CHEST-LIMITED (1 VIEW)   Final Result      1.  Again seen lucency within the right lung base possibly representing a minimal basilar pneumothorax versus linear atelectasis. This is unchanged.      2.  Stable cardiomegaly.      US-EXTREMITY VENOUS LOWER BILAT   Final Result      DX-CHEST-LIMITED (1 VIEW)   Final Result      Increased right basilar atelectasis.      Small right pneumothorax appears to be present. Follow-up in 2 hours is recommended.      Blunting of the costophrenic angles may be related to trace pleural effusions.      Atherosclerotic plaque.      Findings called to the covering clinician.            DX-ABDOMEN FOR TUBE PLACEMENT   Final Result      Enteric feeding tube tip terminates over  the expected region of the first portion of the duodenum.      DX-ABDOMEN FOR TUBE PLACEMENT   Final Result         Feeding tube with tip projecting over the expected area of the second portion duodenum.      [id number] VIEW   Final Result      NG tube tip overlies the gastric body.      DX-CHEST-PORTABLE (1 VIEW)   Final Result      1.  Interval extubation.   2.  Stable mild left basilar atelectasis and/or consolidation.      DX-CHEST-PORTABLE (1 VIEW)   Final Result         1. Interval intubation, with well-positioned lines and tubes.   2. Mild left basilar atelectasis. No new consolidation or significant pleural effusions.         CT-ABDOMEN-PELVIS W/O   Final Result      1.  Dilated small intestine with decompression distally and decompression of the colon consistent with small bowel obstruction. There is pneumatosis involving dilated loops of small intestine consistent with bowel necrosis/ischemia.      2.  Small amount of ascites.      3.  Right lung base atelectasis/consolidation and small right pleural effusion.      This was discussed with ALIYAH TOSCANO at below the 6:00 AM on 3/21/2020.      DX-CHEST-PORTABLE (1 VIEW)   Final Result      1.  Interval placement of a right IJ central line without evidence of complication.      2.  Cardiomegaly.      DX-CHEST-PORTABLE (1 VIEW)   Final Result      Borderline cardiomegaly.      DX-CHEST-LIMITED (1 VIEW)    (Results Pending)        Assessment/Plan  * Perforation of intestine (HCC)- (present on admission)  Assessment & Plan  - CT scan of abdominal pelvis suggested bowel obstruction with pneumatosis involving the small bowels concerning for ischemia or necrosis.    - S/p ex lap with small bowel resection 3/21/20 with Dr. Alfred   ----> Noted to have large output from rectal tube , thus c-dif study was obtained which has been -ve .   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        Anemia- (present on admission)  Assessment & Plan  Likely in setting of surgery, infection  Macrocytic  S/p 1 unit pRBC 3/29/20 for persistently low H/H (although stable, slightly low BPs)-->increased 8.4    Severe protein-calorie malnutrition (HCC)- (present on admission)  Assessment & Plan  Nutrition following  Cortak in place , dietary consult  Placed if there are any change in tube feed to decrease stool output    Urinary retention- (present on admission)  Assessment & Plan  De La Garza in place  Can hopefully remove and re-trial voiding when more mobile    Thrombocytopenia (HCC)- (present on admission)  Assessment & Plan  I likely 2/2 sepsis, not actvely bleeding , will ctm      Hypokalemia- (present on admission)  Assessment & Plan  Will replete as needed    Septic shock (HCC)- (present on admission)  Assessment & Plan  \"Resolved  This is Septic shock Present on admission  SIRS criteria identified on my evaluation include: Tachycardia, with heart rate greater than 90 BPM, Tachypnea, with respirations greater than 20 per minute and Leukopenia, with WBC less than 4,000  Source is Intra abdominal   Presentation includes: Severe sepsis present and initial Lactate level result is >= 4 mmol/L.   Despite appropriate fluid resuscitation with crystalloid given per sepsis guidelines, the patient remains hypotensive with systolic blood pressure less than 90 or MAP less than 65  Hemodynamic support with additional fluids and IV vasopressors as needed to maintain a SBP of 90 or MAP of 65  IV antibiotics as appropriate for source " "of sepsis  Reassessment: I have reassessed the patient's hemodynamic status\"    S/p IV Zosyn for 7 days for E-coli bacteremia  Urine cx growing E-coli   Culture from bowel surgery NGTD  Not sure the source is bowel vs UTI, still low wbc  At 2.5        Pneumothorax  Assessment & Plan  Patient is noted to be short of breath, chest x-ray ordered---> noted to have right basilar pneumothorax.  Chest x-ray repeated after 12 hours--->  Noted to have stable pneumothorax. Will make sure patient oxygenate 100% .   Will have repeat chest -Xray tomorrow for comparison        Sacral wound  Assessment & Plan  Large sacral wound  Wound following, rectal tube in place to assist with healing    Pancytopenia (HCC)  Assessment & Plan  Likely in setting of acute severe illness  CTM    Hypophosphatemia  Assessment & Plan  Will replete and treat as needed     Respiratory failure following trauma and surgery (HCC)- (present on admission)  Assessment & Plan  On  2 L of O2    Acute kidney injury (HCC)- (present on admission)  Assessment & Plan  Resolved  Likely secondary to sepsis and septic shock.  Fluid and hemodynamics resuscitation  Avoid nephrotoxins  Renal dose all medication       VTE prophylaxis: Lovenox      "

## 2020-04-03 NOTE — PROGRESS NOTES
RADIATION ONCOLOGY FOLLOW-UP    DATE OF SERVICE: 4/3/2020    IDENTIFICATION:   A 80 y.o. female with history of T3N0 rectal cancer completing concurrent chemotherapy and radiation therapy in the neoadjuvant setting completed 3/20/2020.  Was admitted to the emergency room and found to have a small bowel obstruction and perforation on 3/21/2020.  Since that time she has undergone exploratory laparotomy and small bowel resection.  She was intubated then extubated now has pancytopenia failure to thrive and G-tube feedings rectal tube because of diarrhea.  She is seen in consultation today at the request of Dr. Riley.    HISTORY OF PRESENT ILLNESS:   As described above patient is in a critical situation with continuous diarrhea and failure to thrive and multiple cyst in failure..  I received a call from Dr. Riley yesterday about what the plan should be because the patient's condition prior to treatment was excellent.  Patient is exhausted and is wanting to go home.    CURRENT MEDICATIONS:  Current Facility-Administered Medications   Medication Dose Route Frequency Provider Last Rate Last Dose   • vancomycin 50 mg/mL oral soln 125 mg  125 mg Oral 4X/DAY Rosemary Curry M.D.   125 mg at 04/03/20 1205   • Pharmacy Consult Request  1 Each Other PHARMACY TO DOSE Rosemary Curry M.D.       • octreotide (SANDOSTATIN) injection 100 mcg  100 mcg Subcutaneous TID Katie Abreu M.D.       • Pharmacy Consult Request  1 Each Other PHARMACY TO DOSE Rosemary Curry M.D.       • D5 1/2 NS infusion   Intravenous Continuous Rosemary Curry M.D. 50 mL/hr at 04/02/20 2055     • loperamide (IMODIUM) capsule 4 mg  4 mg Enteral Tube 4X/DAY Pamela Interiano M.D.   4 mg at 04/03/20 1205   • potassium bicarbonate (KLYTE) effervescent tablet 25 mEq  25 mEq Enteral Tube TID Rosemary Curry M.D.   25 mEq at 04/03/20 1205   • enoxaparin (LOVENOX) inj 40 mg  40 mg Subcutaneous DAILY Jenn Perkins M.D.   40 mg at 04/02/20 6468   • oxyCODONE  immediate-release (ROXICODONE) tablet 5 mg  5 mg Enteral Tube Q4HRS PRN Magdalene Adler M.D.   5 mg at 04/03/20 0512    Or   • oxyCODONE immediate release (ROXICODONE) tablet 10 mg  10 mg Enteral Tube Q4HRS PRN Magdalene Adler M.D.       • omeprazole (FIRST-OMEPRAZOLE) 2 mg/mL oral susp 40 mg  40 mg Enteral Tube DAILY Magdalene Adler M.D.   40 mg at 04/03/20 0502   • Pharmacy Consult: Enteral tube insertion - review meds/change route/product selection  1 Each Other PHARMACY TO DOSE Magdalene Adler M.D.       • ondansetron (ZOFRAN ODT) dispertab 4 mg  4 mg Enteral Tube Q4HRS PRN Magdalene Adler M.D.       • carboxymethylcellulose (REFRESH TEARS) 0.5 % ophthalmic drops 1 Drop  1 Drop Both Eyes PRN Art Landa M.D.   1 Drop at 03/28/20 0558   • bacitracin ointment   Topical PRN Art Landa M.D.       • Respiratory Therapy Consult   Nebulization Continuous RT Caryn Wilson M.D.       • ondansetron (ZOFRAN) syringe/vial injection 4 mg  4 mg Intravenous Q4HRS PRN Caryn Wilson M.D.       • morphine (pf) 10 mg/mL injection 1-5 mg  1-5 mg Intravenous Q HOUR PRN Art Landa M.D.   1 mg at 04/02/20 1532       ALLERGIES:  Patient has no known allergies.    FAMILY HISTORY:    Family History   Problem Relation Age of Onset   • Hypertension Mother    • Cancer Father         colon   • No Known Problems Brother    • No Known Problems Daughter    • No Known Problems Daughter    • No Known Problems Daughter    • Diabetes Neg Hx    • Heart Disease Neg Hx    • Stroke Neg Hx    [unfilled]        SOCIAL HISTORY:     reports that she quit smoking about 30 years ago. Her smoking use included cigarettes. She has a 30.00 pack-year smoking history. She has never used smokeless tobacco. She reports current alcohol use. She reports that she does not use drugs.          ECOG PERFORMANCE STATUS:  4=Completely disabled.  Cannot carry on any self care.  Totally confined to bed or chair.       Vitals:    04/02/20 2322 04/03/20  0416 04/03/20 0725 04/03/20 1110   BP: (!) 90/54 112/54 (!) 97/61 106/65   Pulse: (!) 104 90 (!) 103 100   Resp: (!) 22 (!) 21 18 19   Temp: 36.9 °C (98.4 °F) 36.5 °C (97.7 °F) 36.6 °C (97.9 °F) 36.8 °C (98.2 °F)   TempSrc: Temporal Temporal Temporal Temporal   SpO2: 97% 100% 98% 96%   Weight:       Height:       Location: Abdomen  Description: Sharp        GENERAL: Alert and oriented cachectic visually sick and fatigued.  HEENT: Normocephalic atraumatic mucous membranes dry.    No further exam performed as this was mainly to help troubleshoot as to final plan.    IMPRESSION:    A 80 y.o. with a T3N0 rectal carcinoma status post neoadjuvant chemoradiation therapy now with small bowel obstruction status post surgery with continued failure to thrive diarrhea abnormal labs and generalized fatigue.  Patient is requesting to go home..    RECOMMENDATIONS:   I had a long discussion with the hospitalist , hospice and Dr Matthew who was the medical oncologist.  He suggested I have Dr. Abreu see her as he is the inpatient medical oncologist.  He has seen her and his note will be later today but realizes she is very sick but perhaps if we get her diarrhea under control she may improve.  She is also getting ruled out for C. difficile.  If she is negative for C. difficile.  I would consider Lomotil or codeine to slow the diarrhea.  This is a difficult case in that she was in such great condition prior to treatment, but her situation is tenuous at this time.  At this point I would leave decisions on whether patient would want to consider hospice to her and her family members as well as the hospitalist, hospice team and Dr. Abreu..  I do feel at this point it may be extremely difficult for her to be able to recover from this.    If there is any further questions I am happy to discuss her case.  At this point I will sign off.      Thank you for the opportunity to participate in her care.  If any questions or comments, please do not  hesitate in calling.      Please note that this dictation was created using voice recognition software. I have made every reasonable attempt to correct obvious errors, but I expect that there are errors of grammar and possibly content that I did not discover before finalizing the note.

## 2020-04-03 NOTE — DISCHARGE PLANNING
Received Choice form at 2668  Agency/Facility Name: [facility]  Referral sent per Choice form @ [phone]

## 2020-04-03 NOTE — PROGRESS NOTES
Assumed care at 1845. Pt resting in bed. A&ox 4  abd midline staples.   coretrak in place with impact peptide running at 40ml/hr  Rectal trumpet in place with large amount of stool  De La Garza catheter in place  Sacral wound dressing CDI  3L Rt IJ in place.   Bed alarm in place. Call light within reach. Hourly rounding in place      Pt with a mews of 5. Hr 105. RR 25. BP 96/57. Dr. Curry is aware. IVF changed to d5 1/2NS and increased rate to 50ml/hr. BMP ordered. Pt with a known small rt pneumo. Received a standing xray for worsening SOB and to keep O2 sat @ % for the night. Increased to 3LNC.

## 2020-04-03 NOTE — PROGRESS NOTES
Bedside report received.  Assessment complete.  A&O x 4. Patient calls appropriately.  Patient up with max assist. Bed alarm on.   Patient has 3/10 pain. Pain managed with prescribed medications.  Denies N&V. Tolerating tube feeds at 40mL  Surgical midline incison with staples JONATHAN.  + void via wheatley cath, + flatus, + BM via rectal tube.  Triple lumen IJ in place on R  Patient denies SOB.  SCD's on.  Patient fatigued this AM.  Review plan with of care with patient. Call light and personal belongings with in reach. Hourly rounding in place. All needs met at this time.

## 2020-04-03 NOTE — THERAPY
Physical Therapy Contact Note    Session attempted; pt declined for any in-bed, eob/oob activities 2/2 pain. Discussed role of therapy and pt goals, encouraged pt to discuss her goals regarding medical care/ palliative team. Pt did wish for therapy to possibly try again after discussion with her daughter and tem today; anticipate pt will need medical transport into her own home as there are a flight of stairs and she would be unable to do so as she at last session was max assist for all mobility; will check in on pt next week as appropriate.    Amanda Baig, PT, DPT Pager: [phone]

## 2020-04-03 NOTE — CONSULTS
DATE OF SERVICE:  04/03/2020    Consultation was called by Dr. Rosemary Curry.    REASON FOR CONSULTATION:  History of rectal carcinoma, status post combined   chemoradiation therapy with chemotherapy consisting of Xeloda; chronic   diarrhea, anemia.    HISTORY OF PRESENT ILLNESS:  The patient is a very pleasant lady who was   diagnosed with a T3N0 rectal adenocarcinoma where she presented with    hematochezia.  The patient was started on combined chemoradiation therapy with   chemotherapy consisting of Xeloda.  The treatment was started on 02/25/2020.    Xeloda was stopped on 03/10 due to significant side effects and her radiation   was completed recently.  However, the patient was admitted to the hospital on   03/21/2020 with abdominal pain, distention, intractable nausea and vomiting.    CT findings were consistent with obstruction.  The patient underwent a   laparotomy and lysis of adhesions and small bowel resection on that day.    Since then, she has also developed a decubitus and has had severe diarrhea.    The patient's performance status has also worsened.  Heme/onc consultation was   called to help with further management of her condition.  She continues to   have diarrhea despite her being on Lomotil.  C. diff has been ruled out.  The   patient also has significant anemia.  Tube feeds have also been started.    PAST MEDICAL HISTORY:  Rectal cancer.    PAST SURGICAL HISTORY:  Tonsillectomy, cataract surgery, appendectomy, and   then recent laparotomy with small bowel resection.    PERSONAL AND SOCIAL HISTORY:  Ex-smoker, retired, lives alone.  One of her   daughter is at the bedside.  She works as a respiratory therapist at [employer].    FAMILY HISTORY:  Not pertinent to this hospitalization.    REVIEW OF SYSTEMS:  CONSTITUTIONAL:  Complaining of severe fatigue and weakness.  No fevers.  HEAD AND NECK, EARS, NOSE, AND THROAT:  Denies any headaches, but has pain in   her throat and lips.  RESPIRATORY:  Denies  any cough or shortness of breath.  CARDIOVASCULAR:  No chest pain or palpitations.  GASTROINTESTINAL:  See history of present illness.  MUSCULOSKELETAL:  Has chronic back pain and joint and bone pain.  SKIN/INTEGUMENTARY:  Has a decubitus, also has edema in her extremities, some   bruising.  NEUROLOGIC:  Denies any stroke, but has generalized weakness.  There is no   focal weakness.  PSYCHIATRIC:  Anxious, but denies any depression.  Memory seems to be   perfectly normal.    PHYSICAL EXAMINATION:  GENERAL:  She is alert and oriented x3, not in any distress.  VITAL SIGNS:  Temperature 36.8, pulse 100, respiration rate 19, /65.  HEENT:  Pupils are equal.  Oral mucosa reveals mucositis.  There are thick   secretions in the oropharynx.  NECK:  Supple.  LUNGS:  Clear to auscultation.  HEART:  Reveals I/VI systolic murmur.  ABDOMEN:  Reveals tenderness, but no rebound.  Surgical site appears to be   okay.  EXTREMITIES:  There is +2 bilateral lower extremity edema.  There is some   bruising on her lower extremities.  BACK:  Reveals a decubitus.  NEUROLOGIC:  Power is equal bilaterally.  Power is uniformly decreased.  PSYCHIATRIC:  The patient is alert and oriented.    LABORATORY DATA:  Reviewed.    RADIOLOGICAL STUDIES:  Reviewed.    ASSESSMENT AND PLAN:  1.  Chronic diarrhea.  The patient has chronic diarrhea despite her being on   Lomotil.  My plan is to try Sandostatin.  Her Clostridium difficile is   negative.  2.  History of rectal cancer.  Plan is to continue to observe her.  At this   time, the patient has not decided regarding any treatment or whether she wants   to go for best supportive care.  They wished to wait for 2-3 days in the   future.  3.  Anemia.  We will check out her ferritin.  We will also check out her   reticulocyte count.       ____________________________________     Tejvir MD KEKE Abreu / [place]    DD:  04/03/2020 12:54:47  DT:  04/03/2020 13:32:46    D#:  [id number]  Job#:  [id number]

## 2020-04-03 NOTE — THERAPY
Speech Therapy Contact Note  Patient has now transitioned to comfort care. Patient is currently not being actively followed for therapy services at this time, however may be seen if requested by attending provider for 1 more visit within 30 days to address any discharge needs or if the patient has a change in status.

## 2020-04-03 NOTE — THERAPY
OT tx attempted, seen for education session as pt declined for any in-bed, eob/oob activities 2/2 pain. Discussed role of therapy and pt goals, encouraged pt to discuss her goals regarding medical care, quality of life with family, MD and palliative team. Pt was offered to d/c from OT services; however pt requested therapy to stay on board until she has discussion with family and team. Will check in on pt next week as appropriate.

## 2020-04-03 NOTE — DIETARY
Nutrition support weekly update:  Day 13 of admit.  Elle Barksdale is a 80 y.o. female with admitting DX of Septic shock, Pneumatosis intestinalis.     Tube feeding initiated on 3/27. Current TF via small bowel cortrak is Impact 1.5 @ 40 mL/hr to provide 1440 kcal, 90 gm protein and 739 mL free water per day.     Assessment:  Weight was 76.8 kg via bed scale on 3/28, which is up from weight of 72.1 kg via bed scale on 3/21.     Evaluation:   1. Per RN note this AM, pt tolerating TF @ goal.   2. SLP evaluation on 4/1 recommended continuing cortrak with sips of nectars and ice chips OK.   3. Pt with rectal tube in place, brown watery output noted this AM.   4. Meds: Imodium, potassium bicarbonate.   5. Skin: Radiation buttocks, rectum; sacrum; coccyx; Groin full thickness to sacrum/coccyx, partial thickness to buttocks. Wound therapy last saw on 3/31.   6. Pt with generalized edema (RUE, RLE, LUE, LLE, abdominal); 1+ subconjunctival; trace perineal;   7. Specialized peptide-based formula remains appropriate to meet pt's needs.     Malnutrition risk (RD note 3/27) Severe protein calorie malnutrition as evidenced by 7# wt loss over 1 month and < 50% intake for >/= 5 days.     Recommendations/Plan:  1. Continue TF formula and rate.  2. Fluids per MD.   3. Please obtain new wt as able.     RD Following.

## 2020-04-03 NOTE — PROGRESS NOTES
Hospital Medicine Daily Progress Note    Date of Service  4/3/2020    Chief Complaint  80 y.o. female admitted 3/21/2020 with abdominal pain    Hospital Course    80 y.o. female with a h/o rectal cancer s/p chemoradiation who presented 3/21/2020 with worsening abdominal pain.  CT of the abdomen and pelvis showed small bowel obstruction with pneumatosis involving dilated loops.   She was admitted to the ICU and started on pressors.  She underwent exploratory laparotomy and small bowel resection on 3/21/2020 with Dr. Interiano.  She was extubated on 3/22/20.  She had positive blood and urine cultures for E. Coli, pansensitive.  She was given zosyn x 8 days.     Patient then transferred to surgical floor, surgery continue to follow the patient.  She is noted to have increased amount of output from her rectal tube, C. difficile study has been checked and noted to be negative this is started on Imodium as per Dr. Interiano recommendation.    Chest X-ray didn't show any pneumothorax.  I went to bedside and discussed the CODE STATUS of the patient on 4/3/2020---> she expressed her wish to be DNR.  Palliative came and evaluated the patient, patient stated that he wanted to become comfortable, and she wanted to go home and meet her family.  She also stated she would like to eat. Thus comfort care orders has been initiated. Cortrak has been removed and patient will be provided whatever she would like to eat.          Interval Problem Update  - No acute events overnight, Overnight she was noted to have low blood pressure but improved later, chest X-ray didn't shoe nay pneumothorax.  - Oncology Dr Abreu evaluated the patient   - Also  Patient stated that she would like to  Be comfortable and doesn't want to  Suffer. She  Stated that she would like to be with her family; after an extensive discussion patient is placed on comfort measure .    Consultants/Specialty  Internal Medicine  Surgery (primary)    Code  Status  FULL    Disposition  Rehab when medically clear    Review of Systems  Review of Systems   Constitutional: Negative for chills, fever and malaise/fatigue.   HENT:        Dry lips/mouth   Eyes: Negative for blurred vision, double vision and photophobia.   Respiratory: Negative for cough and shortness of breath.    Cardiovascular: Negative for chest pain and palpitations.   Gastrointestinal: Positive for diarrhea. Negative for abdominal pain, blood in stool, constipation, melena, nausea and vomiting.   Genitourinary: Negative for hematuria.   Musculoskeletal: Positive for myalgias. Negative for falls.   Neurological: Positive for weakness. Negative for dizziness and headaches.   Endo/Heme/Allergies: Does not bruise/bleed easily.   Psychiatric/Behavioral: Negative for substance abuse.        Physical Exam  Temp:  [36.2 °C (97.1 °F)-36.9 °C (98.4 °F)] 36.8 °C (98.2 °F)  Pulse:  [] 100  Resp:  [18-25] 19  BP: ()/(54-65) 106/65  SpO2:  [94 %-100 %] 96 %    Physical Exam  Vitals signs and nursing note reviewed.   Constitutional:       General: She is not in acute distress.     Appearance: She is ill-appearing. She is not toxic-appearing or diaphoretic.   HENT:      Head: Normocephalic and atraumatic.      Nose:      Comments: cortrak in place     Mouth/Throat:      Mouth: Mucous membranes are dry.   Eyes:      General: No scleral icterus.     Extraocular Movements: Extraocular movements intact.      Conjunctiva/sclera: Conjunctivae normal.   Neck:      Musculoskeletal: Normal range of motion and neck supple.      Comments: Central line RIJ c/d/i  Cardiovascular:      Rate and Rhythm: Normal rate and regular rhythm.      Heart sounds: No murmur. No friction rub. No gallop.    Pulmonary:      Effort: Pulmonary effort is normal.      Breath sounds: Normal breath sounds.   Abdominal:      General: Bowel sounds are normal. There is no distension.      Palpations: Abdomen is soft. There is no mass.       Tenderness: There is no abdominal tenderness.      Comments: Abdominal incision with suture clean/dry and intact   Musculoskeletal:      Comments: anasarca   Skin:     Comments: Extremities warmer today   Neurological:      General: No focal deficit present.      Mental Status: She is alert and oriented to person, place, and time.      Motor: No weakness.   Psychiatric:         Mood and Affect: Mood normal.         Behavior: Behavior normal.       Noted to have dry mouth    Fluids    Intake/Output Summary (Last 24 hours) at 4/3/2020 1533  Last data filed at 4/3/2020 1300  Gross per 24 hour   Intake 3145.67 ml   Output 3485 ml   Net -339.33 ml       Laboratory  Recent Labs     04/02/20  0815 04/02/20  0940 04/03/20  0240   WBC 2.4* 2.2* 2.3*   RBC 1.99* 2.35* 2.37*   HEMOGLOBIN 6.2* 7.3* 7.2*   HEMATOCRIT 20.3* 23.4* 23.7*   .5* 99.6* 100.0*   MCH 30.7 31.1 30.4   MCHC 30.2* 31.2* 30.4*   RDW 64.5* 65.0* 63.1*   PLATELETCT 164 152* 160*   MPV 11.0 10.7 10.7     Recent Labs     04/02/20  0815 04/02/20  2100 04/03/20  0240   SODIUM 143 141 142   POTASSIUM 3.1* 4.1 3.9   CHLORIDE 113* 111 115*   CO2 22 23 21   GLUCOSE 113* 109* 115*   BUN 19 19 20   CREATININE 0.29* 0.29* 0.34*   CALCIUM 7.0* 7.1* 7.0*                   Imaging  DX-CHEST-LIMITED (1 VIEW)   Final Result      No pneumothorax is identified on the current exam.      DX-CHEST-LIMITED (1 VIEW)   Final Result      1.  Again seen lucency within the right lung base possibly representing a minimal basilar pneumothorax versus linear atelectasis. This is unchanged.      2.  Stable cardiomegaly.      US-EXTREMITY VENOUS LOWER BILAT   Final Result      DX-CHEST-LIMITED (1 VIEW)   Final Result      Increased right basilar atelectasis.      Small right pneumothorax appears to be present. Follow-up in 2 hours is recommended.      Blunting of the costophrenic angles may be related to trace pleural effusions.      Atherosclerotic plaque.      Findings called to the  covering clinician.            DX-ABDOMEN FOR TUBE PLACEMENT   Final Result      Enteric feeding tube tip terminates over the expected region of the first portion of the duodenum.      DX-ABDOMEN FOR TUBE PLACEMENT   Final Result         Feeding tube with tip projecting over the expected area of the second portion duodenum.      [id number] VIEW   Final Result      NG tube tip overlies the gastric body.      DX-CHEST-PORTABLE (1 VIEW)   Final Result      1.  Interval extubation.   2.  Stable mild left basilar atelectasis and/or consolidation.      DX-CHEST-PORTABLE (1 VIEW)   Final Result         1. Interval intubation, with well-positioned lines and tubes.   2. Mild left basilar atelectasis. No new consolidation or significant pleural effusions.         CT-ABDOMEN-PELVIS W/O   Final Result      1.  Dilated small intestine with decompression distally and decompression of the colon consistent with small bowel obstruction. There is pneumatosis involving dilated loops of small intestine consistent with bowel necrosis/ischemia.      2.  Small amount of ascites.      3.  Right lung base atelectasis/consolidation and small right pleural effusion.      This was discussed with ALIYAH TOSCANO at below the 6:00 AM on 3/21/2020.      DX-CHEST-PORTABLE (1 VIEW)   Final Result      1.  Interval placement of a right IJ central line without evidence of complication.      2.  Cardiomegaly.      DX-CHEST-PORTABLE (1 VIEW)   Final Result      Borderline cardiomegaly.           Assessment/Plan  * Perforation of intestine (HCC)- (present on admission)  Assessment & Plan  - CT scan of abdominal pelvis suggested bowel obstruction with pneumatosis involving the small bowels concerning for ischemia or necrosis.    - S/p ex lap with small bowel resection 3/21/20 with Dr. Interiano   ----> Noted to have large output from rectal tube , thus c-dif study was obtained which has been -ve .   -----> still noted to have lots of output from rectal  "tube   ----> Dr alfred following and will continue  imodium  4 mg po qid     Hypernatremia- (present on admission)  Assessment & Plan  Na improving gradually, on 300 cc of free water flushes every 4 hrs   D5W at  75 cc/hr  Bmp bid     Bacteremia due to Escherichia coli- (present on admission)  Assessment & Plan  S/p 7 days zosyn  Source: Likely Urinary tract infection      Rectal cancer (HCC)- (present on admission)  Assessment & Plan  Stage II a adenocarcinoma of the rectum s.p chemo and radiation        Anemia- (present on admission)  Assessment & Plan  Likely in setting of surgery, infection  Macrocytic  S/p 1 unit pRBC 3/29/20 for persistently low H/H (although stable, slightly low BPs)-->increased 8.4    Severe protein-calorie malnutrition (HCC)- (present on admission)  Assessment & Plan  Nutrition following  Cortak in place , dietary consult  Placed if there are any change in tube feed to decrease stool output    Urinary retention- (present on admission)  Assessment & Plan  De [place] Garza in place    Thrombocytopenia (HCC)- (present on admission)  Assessment & Plan  I likely 2/2 sepsis, not actvely bleeding , will ctm      Hypokalemia- (present on admission)  Assessment & Plan  Will replete as needed    Septic shock (HCC)- (present on admission)  Assessment & Plan  \"Resolved  This is Septic shock Present on admission  SIRS criteria identified on my evaluation include: Tachycardia, with heart rate greater than 90 BPM, Tachypnea, with respirations greater than 20 per minute and Leukopenia, with WBC less than 4,000  Source is Intra abdominal   Presentation includes: Severe sepsis present and initial Lactate level result is >= 4 mmol/L.   Despite appropriate fluid resuscitation with crystalloid given per sepsis guidelines, the patient remains hypotensive with systolic blood pressure less than 90 or MAP less than 65  Hemodynamic support with additional fluids and IV vasopressors as needed to maintain a SBP of 90 or MAP of " "65  IV antibiotics as appropriate for source of sepsis  Reassessment: I have reassessed the patient's hemodynamic status\"    S/p IV Zosyn for 7 days for E-coli bacteremia  Urine cx growing E-coli   Culture from bowel surgery NGTD      Pneumothorax  Assessment & Plan  Patient is noted to be short of breath, chest x-ray ordered---> noted to have right basilar pneumothorax.  Chest x-ray repeated after 12 hours--->  Noted to have stable pneumothorax. Will make sure patient oxygenate 100% .   Will have repeat chest -Xray tomorrow for comparison        Sacral wound  Assessment & Plan  Large sacral wound  Wound following, rectal tube in place to assist with healing    Pancytopenia (HCC)  Assessment & Plan  Likely in setting of acute severe illness    Hypophosphatemia  Assessment & Plan  Will replete and treat as needed     Respiratory failure following trauma and surgery (HCC)- (present on admission)  Assessment & Plan  On  2 L of O2    Acute kidney injury (HCC)- (present on admission)  Assessment & Plan  Resolved  Likely secondary to sepsis and septic shock.  Fluid and hemodynamics resuscitation  Avoid nephrotoxins  Renal dose all medication       VTE prophylaxis: Lovenox      After an extensive discussion with the patient/ family by Palliative team----> patient decided to go for comfort measure. She wish to drink coke and wanted to have cortrak removed.  Comfort measure will be provided.      "

## 2020-04-03 NOTE — PROGRESS NOTES
Progress Note    Author:  Pamela Interiano MD    Date & Time:   4/3/2020   11:51 AM          Patient ID:             Name:             Elle Barksdale   YOB: 1940  Age:                 80 y.o.  female   MRN:               [id number]    ______POD#12  Ex lap for small bowel obstruction with perforation, secondary to adhesions  Hx ___Rectal CA; recently began chemotherapy/radiation______________________________________________________________      Interval Events:       Patient still having 2-3l liquid stool output from rectal tube  Palliative care consult placed         Exam:       Vitals:    04/03/20 1110   BP: 106/65   Pulse: 100   Resp: 19   Temp: 36.8 °C (98.2 °F)   SpO2: 96%     SpO2  Min: 91 %  Max: 100 %  O2 (LPM)  Min: 0  Max: 3  FiO2%  Min: 21 %  Max: 24 %  Temp  Min: 36.1 °C (97 °F)  Max: 37.2 °C (98.9 °F)    Intake/Output Summary (Last 24 hours) at 4/3/2020 1151  Last data filed at 4/3/2020 1110  Gross per 24 hour   Intake 3544 ml   Output 3960 ml   Net -416 ml       DIET ORDERS (From admission to next 24h)     Start     Ordered    03/29/20 1302  Diet Tube Feeding  CONTINUOUS DIET     Comments:  Start Impact 1.5 @ 10 ml/hr and okay to advance to goal per protocol per MD   Question Answer Comment   Which Rate/Volume? 40 ml/hr    Formula: IMPACT PEPTIDE 1.5    Specify Type: CONTINUOUS        03/29/20 1301    03/26/20 1432  Diet NPO  ALL MEALS     Question:  Restrict to:  Answer:  Ice Chips  Comment:  single ice chips with RN only    03/26/20 1432                    Physical Exam  Nursing note reviewed.   Constitutional:       Appearance: Alert, oriented x 4.NAD  HENT:      Head: Normocephalic and atraumatic.      Mouth/Throat:      Mouth: Mucous membranes are dry  Eyes:      Pupils: Pupils are equal, round, and reactive to light.      No scleral Icterus present  Cardiovascular:      Rate and Rhythm: Normal rate and regular rhythm. Extremities warm, 2+ edema BUE/BLE  Pulmonary:      Effort:  Pulmonary effort is normal.      Breath sounds: No wheezes or stridor  Abdominal:      General: Abdomen is flat. Soft non-tender, non-distended.  Incision c/d/i No erythema              Recent Labs     04/01/20  1230  04/02/20  0815 04/02/20  2100 04/03/20  0240   SODIUM 148*   < > 143 141 142   POTASSIUM 2.9*   < > 3.1* 4.1 3.9   CHLORIDE 117*   < > 113* 111 115*   CO2 21   < > 22 23 21   BUN 20   < > 19 19 20   CREATININE 0.40*   < > 0.29* 0.29* 0.34*   MAGNESIUM 1.6  --   --   --   --    PHOSPHORUS 2.4*  --   --   --   --    CALCIUM 7.0*   < > 7.0* 7.1* 7.0*    < > = values in this interval not displayed.       Recent Labs     04/02/20  0815 04/02/20 2100 04/03/20  0240   GLUCOSE 113* 109* 115*       Recent Labs     04/02/20  0815 04/02/20  0940 04/03/20  0240   RBC 1.99* 2.35* 2.37*   HEMOGLOBIN 6.2* 7.3* 7.2*   HEMATOCRIT 20.3* 23.4* 23.7*   PLATELETCT 164 152* 160*       Recent Labs     04/02/20  0815 04/02/20  0940 04/03/20  0240   WBC 2.4* 2.2* 2.3*   NEUTSPOLYS 86.70* 83.30* 80.90*   LYMPHOCYTES 7.10* 8.60* 10.40*   MONOCYTES 4.40 4.30 7.00   EOSINOPHILS 0.00 0.90 0.00   BASOPHILS 0.00 0.00 0.00         ________________________________________________________________________      Patient Active Problem List   Diagnosis   • Osteoarthritis   • Blood per rectum   • Rectal cancer (HCC)   • Septic shock (HCC)   • Perforation of intestine (HCC)   • Acute kidney injury (HCC)   • Hypokalemia   • Respiratory failure following trauma and surgery (HCC)   • Bacteremia due to Escherichia coli   • Thrombocytopenia (HCC)   • Hypernatremia   • Urinary retention   • Severe protein-calorie malnutrition (HCC)   • Anemia   • Hypophosphatemia   • Pancytopenia (HCC)   • Sacral wound   • Pneumothorax       Plan:  Discussed with hospitalist; plan to Re-Check C-diff  Patient also will be started on empiric oral vancomycin    Tube feeds at goal  D/c staples

## 2020-04-03 NOTE — PROGRESS NOTES
"Palliative Care follow-up  Nerissa pt's daughter at bedside.  This writer assisted pt with completing a DPOA-HC. Dr. Riley signed certificate of \"competency\".  Pt selected daughter Maria Eugenia Lara as agent with Nerissa as the alternate.  Statements of Desire  2, 3, & 5 selected.  Nerissa video conferencing with nicole.      Updated: Shelby Wen RN    Plan: Scan advance directive into Epic once executed.  Will return and discuss goals of care and a plan of care with pt.    Thank you for allowing Palliative Care to support this patient and family. Contact [phone] for additional assistance, change in patient status, or with any questions/concerns.   "

## 2020-04-03 NOTE — PROGRESS NOTES
2 RN skin check complete.   Devices in place. PIV, nasal cannula, coretrak, R IJ, wheatley catheter, rectal trumpet, SCDs,   Skin assessed under devices . yes  Confirmed pressure ulcers found on :   Dressing to sacrum CDI. Changed q48 hrs  Bottom lip scabbed and bleeding  Generalized edema. B/l UE and LE edema  Scab on left abd folds  abd midline staples  The following interventions in place:  Low airloss bed, b/l heel protector in place, b/l elbow mepilex in place. q2 turn. interdry to abd folds. Pillows for repositioning. Nasal cannula padded

## 2020-04-03 NOTE — DISCHARGE PLANNING
Anticipated Discharge Disposition: Home with Hospice.    Action: Viridiana Carter, Palliative RN informed LSW, this patient's choice is [facility] and the patient's daughter Nerissa will be at home to assist in the patient's needs.  Per Viridiana, this patient provided verbal consent to submit the referral to [facility], choice form provided to [facility].  LSW send a Tiger Text to Dr. Interiano requesting the Hospice Order.    Barriers to Discharge: None    Plan: Home with Hospice, pending acceptance from [facility], medical clearance, and discharge arrangements.

## 2020-04-03 NOTE — PROGRESS NOTES
"Palliative Care follow-up  Lengthy conversation with pt regarding her goals of care and how she wants to proceed.  Pt states she is ready to die. \"I don't want to live this this\"  \"I just want to go home\".  Discussed going home with hospice.  Hospice described briefly and questions answered.  Pt will need the support of family as prior to her admit pt lived alone.  Pt selected [facility].  Pt also wants to be able to eat and drink.  Pt not wanting further tests or interventions.   Discussed option of changing the focus of care to only those interventions that relate to promoting her  comfort.  Kamille in agreement with focusing on managing her pain and symptoms. All questions answered and support provided.  Permission received to call and update pt's daughter Nerissa.    Phone call to daughter Nerissa.  Updated Nerissa on conversation with her mother.  Nerissa states that she knows that is what her mom wants and that she and all her sisters are supportive.  In agreement  with referral to [facility]. All questions answered and support provided.    Updated: Shelby Wen RN, Dr. Chappell, [facility], [facility]    Plan: Change the focus of care to the pt's comfort.  Hospice referral.  Choice received for [facility]. Discharge home with hospice.     Thank you for allowing Palliative Care to support this patient and family. Contact [phone] for additional assistance, change in patient status, or with any questions/concerns.   "

## 2020-04-03 NOTE — CARE PLAN
Problem: Safety  Goal: Will remain free from injury  Outcome: PROGRESSING AS EXPECTED  Goal: Will remain free from falls  Outcome: PROGRESSING AS EXPECTED   Updated about POC. Reinforce call light use. Pt acknowledged understanding    Problem: Venous Thromboembolism (VTW)/Deep Vein Thrombosis (DVT) Prevention:  Goal: Patient will participate in Venous Thrombosis (VTE)/Deep Vein Thrombosis (DVT)Prevention Measures  Outcome: PROGRESSING AS EXPECTED  SCDs in place     Problem: Respiratory:  Goal: Respiratory status will improve  Outcome: PROGRESSING AS EXPECTED  Increased respiratory effort. O2 increased to 3LNC to keep sat >100%.

## 2020-04-04 NOTE — PROGRESS NOTES
Pt alert and oriented  On 1L NC for comfort  Removed cortrax and stopped tube feeds today  IV fluids D/C  Pt declines wanting to eat or drink anything, refused meds  Denies pain when asked, pain meds given earlier for abd pain  Pt states she just feels tired and would like to sleep  Refused dressing change  Allowing q2 turns

## 2020-04-04 NOTE — PROGRESS NOTES
Oncology/Hematology Progress Note               Author: Katie Abreu M.D. 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 difficile negative  Interval History:  Remains very lethargic.  Still has issues with the diarrhea although it might be slightly better.  Oral intake remains poor poor.  Oral mucositis is slightly better    Review of Systems:  Review of Systems   Constitutional: Positive for malaise/fatigue and weight loss.   Respiratory: Negative for cough and hemoptysis.    Cardiovascular: Negative for chest pain, palpitations and orthopnea.   Gastrointestinal: Positive for abdominal pain and diarrhea.   Genitourinary: Negative for dysuria and hematuria.       Physical Exam:  Physical Exam  Constitutional:       General: She is not in acute distress.     Appearance: She is ill-appearing.   Cardiovascular:      Rate and Rhythm: Normal rate and regular rhythm.      Pulses: Normal pulses.      Heart sounds: No murmur. No gallop.    Abdominal:      General: There is no distension.      Palpations: There is no mass.      Tenderness: There is abdominal tenderness.      Hernia: No hernia is present.   Musculoskeletal:         General: No deformity.      Right lower leg: Edema present.      Left lower leg: Edema present.   Skin:     Coloration: Skin is not jaundiced.      Findings: Bruising present.         Labs:          Recent Labs     04/02/20  0815 04/02/20  2100 04/03/20  0240   SODIUM 143 141 142   POTASSIUM 3.1* 4.1 3.9   CHLORIDE 113* 111 115*   CO2 22 23 21   BUN 19 19 20   CREATININE 0.29* 0.29* 0.34*   CALCIUM 7.0* 7.1* 7.0*     Recent Labs     04/02/20  0815 04/02/20  2100 04/03/20  0240   GLUCOSE 113* 109* 115*     Recent Labs     04/02/20  0815 04/02/20  0940 04/03/20  0240   RBC 1.99* 2.35* 2.37*   HEMOGLOBIN 6.2* 7.3* 7.2*   HEMATOCRIT 20.3* 23.4* 23.7*    PLATELETCT 164 152* 160*     Recent Labs     20  0815 20  0940 20  0240   WBC 2.4* 2.2* 2.3*   NEUTSPOLYS 86.70* 83.30* 80.90*   LYMPHOCYTES 7.10* 8.60* 10.40*   MONOCYTES 4.40 4.30 7.00   EOSINOPHILS 0.00 0.90 0.00   BASOPHILS 0.00 0.00 0.00     Recent Labs     20  0815 20  2100 20  0240   SODIUM 143 141 142   POTASSIUM 3.1* 4.1 3.9   CHLORIDE 113* 111 115*   CO2 22 23 21   GLUCOSE 113* 109* 115*   BUN 19  20   CREATININE 0.29* 0.29* 0.34*   CALCIUM 7.0* 7.1* 7.0*     Hemodynamics:  Temp (24hrs), Av.6 °C (97.9 °F), Min:36.3 °C (97.3 °F), Max:36.8 °C (98.3 °F)  Temperature: 36.3 °C (97.3 °F)  Pulse  Av.1  Min: 43  Max: 115   Blood Pressure : 103/64     Respiratory:    Respiration: 19, Pulse Oximetry: 96 %        RUL Breath Sounds: Clear, RML Breath Sounds: Diminished, RLL Breath Sounds: Diminished, DAVON Breath Sounds: Clear, LLL Breath Sounds: Diminished  Fluids:    Intake/Output Summary (Last 24 hours) at 2020 1322  Last data filed at 2020 1120  Gross per 24 hour   Intake 1280 ml   Output 2100 ml   Net -820 ml        GI/Nutrition:  No orders of the defined types were placed in this encounter.    Medical Decision Making, by Problem:  Active Hospital Problems    Diagnosis   • *Perforation of intestine (HCC) [K63.1]   • Hypernatremia [E87.0]   • Bacteremia due to Escherichia coli [R78.81, B96.20]   • Rectal cancer (HCC) [C20]   • Severe protein-calorie malnutrition (HCC) [E43]   • Anemia [D64.9]   • Urinary retention [R33.9]   • Thrombocytopenia (HCC) [D69.6]   • Septic shock (HCC) [A41.9, R65.21]   • Hypokalemia [E87.6]   • Respiratory failure following trauma and surgery (HCC) [J95.821]   • Acute kidney injury (HCC) [N17.9]   • Pneumothorax [J93.9]   • Sacral wound [S31.000A]   • Hypophosphatemia [E83.39]   • Pancytopenia (formerly [facility]) [D61.818]       Plan:  Diarrhea-C. difficile negative.  Continue Sandostatin.  I will change her Imodium to liquid form.  Rectal  adenocarcinoma-has completed neoadjuvant chemoradiation therapy.  Gnosis appears poor.    Quality-Core Measures

## 2020-04-04 NOTE — PROGRESS NOTES
2 RN skin assessment completed  Dressing to sacrum, slight leakage around mepilex, however, pt refused it to be changed  Lips dry, scabbed, bleeding at times  abd midline with steristrips  De La Garza catheter and rectal trumpet in place  Pt q2turn, low airloss bed, heel protectors in place, elbow meplix in place

## 2020-04-04 NOTE — PROGRESS NOTES
Palliative Care follow-up  Pt awake and alert with no complaints of pain. Happy to have feeding tube removed.  Just drank a little coke.  Nerissa pt's daughter at bedside.  Provided notarized copy of DPOA-HC.  Will scan into medical record.  Family working on discharge plan with [facility].  POLST form completed with pt and Nerissa.  Dr. Riley to review and sign.  No additionally questions.  Encouraged to call for any needs.    Updated: Leonor Wen RN    Plan: POLST to be completed and scanned into EMR.  Original pink copy to be discharged with pt.  Pt and family working on discharge home with [facility].    Thank you for allowing Palliative Care to support this patient and family. Contact [phone] for additional assistance, change in patient status, or with any questions/concerns.

## 2020-04-04 NOTE — PROGRESS NOTES
Hospital Medicine Daily Progress Note    Date of Service  4/4/2020    Chief Complaint  80 y.o. female admitted 3/21/2020 with abdominal pain    Hospital Course    80 y.o. female with a h/o rectal cancer s/p chemoradiation who presented 3/21/2020 with worsening abdominal pain.  CT of the abdomen and pelvis showed small bowel obstruction with pneumatosis involving dilated loops.   She was admitted to the ICU and started on pressors.  She underwent exploratory laparotomy and small bowel resection on 3/21/2020 with Dr. Interiano.  She was extubated on 3/22/20.  She had positive blood and urine cultures for E. Coli, pansensitive.  She was given zosyn x 8 days.     Patient then transferred to surgical floor, surgery continue to follow the patient.  She is noted to have increased amount of output from her rectal tube, C. difficile study has been checked and noted to be negative this is started on Imodium as per Dr. Interiano recommendation.      Chest X-ray  Initially showed Pneumothorax on 4/2/2020 but on 4/3/2020--> no pneumo     I went to bedside and discussed the CODE STATUS of the patient on 4/3/2020---> she expressed her wish to be DNR.  Palliative came and evaluated the patient, patient stated that he wanted to become comfortable, and she wanted to go home and meet her family.  She also stated she would like to eat. Thus comfort care orders has been initiated. Cortrak has been removed and patient will be provided whatever she would like to eat.            Interval Problem Update  - Patient has been placed on comfort measure  And noted to be comfortable   - Plan of care has been discussed with the patient family       Consultants/Specialty  Internal Medicine  Surgery (primary)  Oncology     Code Status  FULL    Disposition  Rehab when medically clear    Review of Systems  Review of Systems   Constitutional: Positive for malaise/fatigue. Negative for chills and fever.        She stated that she has  Been feeling comfortable     HENT:        Dry lips/mouth   Eyes: Negative for blurred vision, double vision and photophobia.   Respiratory: Negative for cough and shortness of breath.    Cardiovascular: Negative for chest pain and palpitations.   Gastrointestinal: Positive for diarrhea. Negative for abdominal pain, blood in stool, constipation, melena, nausea and vomiting.   Genitourinary: Negative for hematuria.   Musculoskeletal: Positive for myalgias. Negative for falls.   Neurological: Positive for weakness. Negative for dizziness and headaches.   Endo/Heme/Allergies: Does not bruise/bleed easily.   Psychiatric/Behavioral: Negative for substance abuse.   All other systems reviewed and are negative.       Physical Exam  Temp:  [36.3 °C (97.3 °F)-36.8 °C (98.3 °F)] 36.3 °C (97.3 °F)  Pulse:  [100-105] 104  Resp:  [18-19] 19  BP: ()/(59-65) 103/64  SpO2:  [96 %-97 %] 96 %    Physical Exam  Vitals signs and nursing note reviewed.   Constitutional:       General: She is not in acute distress.     Appearance: She is ill-appearing.      Comments: Lethargic   HENT:      Head: Normocephalic and atraumatic.      Nose:      Comments: cortrak in place     Mouth/Throat:      Mouth: Mucous membranes are dry.   Eyes:      General: No scleral icterus.  Neck:      Comments: Central line RIJ c/d/i  Cardiovascular:      Rate and Rhythm: Normal rate and regular rhythm.   Pulmonary:      Comments: Auscultated from front,  CTAB  Abdominal:      General: Bowel sounds are normal. There is no distension.      Palpations: Abdomen is soft. There is no mass.      Tenderness: There is no abdominal tenderness.      Comments: Abdominal incision with suture clean/dry and intact   Musculoskeletal:      Comments: anasarca   Skin:     Comments: Extremities warmer today   Neurological:      Mental Status: She is alert.      Motor: No weakness.       Noted to have dry mouth    Fluids    Intake/Output Summary (Last 24 hours) at 4/4/2020 0932  Last data filed at 4/4/2020  0715  Gross per 24 hour   Intake 1730 ml   Output 2385 ml   Net -655 ml       Laboratory  Recent Labs     04/02/20  0815 04/02/20  0940 04/03/20  0240   WBC 2.4* 2.2* 2.3*   RBC 1.99* 2.35* 2.37*   HEMOGLOBIN 6.2* 7.3* 7.2*   HEMATOCRIT 20.3* 23.4* 23.7*   .5* 99.6* 100.0*   MCH 30.7 31.1 30.4   MCHC 30.2* 31.2* 30.4*   RDW 64.5* 65.0* 63.1*   PLATELETCT 164 152* 160*   MPV 11.0 10.7 10.7     Recent Labs     04/02/20  0815 04/02/20  2100 04/03/20  0240   SODIUM 143 141 142   POTASSIUM 3.1* 4.1 3.9   CHLORIDE 113* 111 115*   CO2 22 23 21   GLUCOSE 113* 109* 115*   BUN 19 19 20   CREATININE 0.29* 0.29* 0.34*   CALCIUM 7.0* 7.1* 7.0*                   Imaging  DX-CHEST-LIMITED (1 VIEW)   Final Result      No pneumothorax is identified on the current exam.      DX-CHEST-LIMITED (1 VIEW)   Final Result      1.  Again seen lucency within the right lung base possibly representing a minimal basilar pneumothorax versus linear atelectasis. This is unchanged.      2.  Stable cardiomegaly.      US-EXTREMITY VENOUS LOWER BILAT   Final Result      DX-CHEST-LIMITED (1 VIEW)   Final Result      Increased right basilar atelectasis.      Small right pneumothorax appears to be present. Follow-up in 2 hours is recommended.      Blunting of the costophrenic angles may be related to trace pleural effusions.      Atherosclerotic plaque.      Findings called to the covering clinician.            DX-ABDOMEN FOR TUBE PLACEMENT   Final Result      Enteric feeding tube tip terminates over the expected region of the first portion of the duodenum.      DX-ABDOMEN FOR TUBE PLACEMENT   Final Result         Feeding tube with tip projecting over the expected area of the second portion duodenum.      [id number] VIEW   Final Result      NG tube tip overlies the gastric body.      DX-CHEST-PORTABLE (1 VIEW)   Final Result      1.  Interval extubation.   2.  Stable mild left basilar atelectasis and/or consolidation.      DX-CHEST-PORTABLE (1  VIEW)   Final Result         1. Interval intubation, with well-positioned lines and tubes.   2. Mild left basilar atelectasis. No new consolidation or significant pleural effusions.         CT-ABDOMEN-PELVIS W/O   Final Result      1.  Dilated small intestine with decompression distally and decompression of the colon consistent with small bowel obstruction. There is pneumatosis involving dilated loops of small intestine consistent with bowel necrosis/ischemia.      2.  Small amount of ascites.      3.  Right lung base atelectasis/consolidation and small right pleural effusion.      This was discussed with ALIYAH TOSCANO at below the 6:00 AM on 3/21/2020.      DX-CHEST-PORTABLE (1 VIEW)   Final Result      1.  Interval placement of a right IJ central line without evidence of complication.      2.  Cardiomegaly.      DX-CHEST-PORTABLE (1 VIEW)   Final Result      Borderline cardiomegaly.           Assessment/Plan  * Perforation of intestine (HCC)- (present on admission)  Assessment & Plan  - CT scan of abdominal pelvis suggested bowel obstruction with pneumatosis involving the small bowels concerning for ischemia or necrosis.    - S/p ex lap with small bowel resection 3/21/20 with Dr. Alfred   ----> Noted to have large output from rectal tube , thus c-dif study was obtained which has been -ve .   -----> still noted to have lots of output from rectal tube   ----> Dr alfred following and will continue  imodium  4 mg po qid     Hypernatremia- (present on admission)  Assessment & Plan  Na improving gradually, on 300 cc of free water flushes every 4 hrs   D5W at  75 cc/hr  Bmp bid     Bacteremia due to Escherichia coli- (present on admission)  Assessment & Plan  S/p 7 days zosyn  Source: Likely Urinary tract infection      Rectal cancer (HCC)- (present on admission)  Assessment & Plan  Stage II a adenocarcinoma of the rectum s.p chemo and radiation        Anemia- (present on admission)  Assessment & Plan  Likely in setting  "of surgery, infection  Macrocytic  S/p 1 unit pRBC 3/29/20 for persistently low H/H (although stable, slightly low BPs)-->increased 8.4    Severe protein-calorie malnutrition (HCC)- (present on admission)  Assessment & Plan  Nutrition following  Cortak in place , dietary consult  Placed if there are any change in tube feed to decrease stool output    Urinary retention- (present on admission)  Assessment & Plan  De La Garza in place    Thrombocytopenia (HCC)- (present on admission)  Assessment & Plan  I likely 2/2 sepsis, not actvely bleeding , will ctm      Hypokalemia- (present on admission)  Assessment & Plan  Will replete as needed    Septic shock (HCC)- (present on admission)  Assessment & Plan  \"Resolved  This is Septic shock Present on admission  SIRS criteria identified on my evaluation include: Tachycardia, with heart rate greater than 90 BPM, Tachypnea, with respirations greater than 20 per minute and Leukopenia, with WBC less than 4,000  Source is Intra abdominal   Presentation includes: Severe sepsis present and initial Lactate level result is >= 4 mmol/L.   Despite appropriate fluid resuscitation with crystalloid given per sepsis guidelines, the patient remains hypotensive with systolic blood pressure less than 90 or MAP less than 65  Hemodynamic support with additional fluids and IV vasopressors as needed to maintain a SBP of 90 or MAP of 65  IV antibiotics as appropriate for source of sepsis  Reassessment: I have reassessed the patient's hemodynamic status\"    S/p IV Zosyn for 7 days for E-coli bacteremia  Urine cx growing E-coli   Culture from bowel surgery NGTD      Pneumothorax  Assessment & Plan  Patient is noted to be short of breath, chest x-ray ordered---> noted to have right basilar pneumothorax.  Chest x-ray repeated after 12 hours--->  Noted to have stable pneumothorax. Will make sure patient oxygenate 100% .   Will have repeat chest -Xray tomorrow for comparison        Sacral wound  Assessment & " Plan  Large sacral wound  Wound following, rectal tube in place to assist with healing    Pancytopenia (HCC)  Assessment & Plan  Likely in setting of acute severe illness    Hypophosphatemia  Assessment & Plan  Will replete and treat as needed     Respiratory failure following trauma and surgery (HCC)- (present on admission)  Assessment & Plan  On  2 L of O2    Acute kidney injury (HCC)- (present on admission)  Assessment & Plan  Resolved  Likely secondary to sepsis and septic shock.  Fluid and hemodynamics resuscitation  Avoid nephrotoxins  Renal dose all medication       VTE prophylaxis: Lovenox      4/3/2020  After an extensive discussion with the patient/ family by Palliative team----> patient decided to go for comfort measure. She wish to drink coke and wanted to have cortrak removed.  Comfort measure will be provided.    4/4/2020  Patient is noted to be comfortable,  And will provide comfort measure only at this time   Palliative  Has been following and will follow their recs , pending [facility]   Discussed the plan of care with family   No lab draw

## 2020-04-05 NOTE — PROGRESS NOTES
Hospital Medicine Daily Progress Note    Date of Service  4/5/2020    Chief Complaint  80 y.o. female admitted 3/21/2020 with abdominal pain    Hospital Course    80 y.o. female with a h/o rectal cancer s/p chemoradiation who presented 3/21/2020 with worsening abdominal pain.  CT of the abdomen and pelvis showed small bowel obstruction with pneumatosis involving dilated loops.   She was admitted to the ICU and started on pressors.  She underwent exploratory laparotomy and small bowel resection on 3/21/2020 with Dr. Interiano.  She was extubated on 3/22/20.  She had positive blood and urine cultures for E. Coli, pansensitive.  She was given zosyn x 8 days.     Patient then transferred to surgical floor, surgery continue to follow the patient.  She is noted to have increased amount of output from her rectal tube, C. difficile study has been checked and noted to be negative this is started on Imodium as per Dr. Interiano recommendation.      Chest X-ray  Initially showed Pneumothorax on 4/2/2020 but on 4/3/2020--> no pneumo     I went to bedside and discussed the CODE STATUS of the patient on 4/3/2020---> she expressed her wish to be DNR.  Palliative came and evaluated the patient, patient stated that he wanted to become comfortable, and she wanted to go home and meet her family. Thus comfort care orders has been initiated. Cortrak has been removed and patient will be provided whatever she would like to eat.     Patient is noted to be comfortable, so stated that she just received morphine and her pain is better.She stated that he has been happy since her  cortrak has been removed.          Interval Problem Update  -   Noted to be comfortable   - discussed the plan of care with nursing at bedside and Family   - Palliative team following    Consultants/Specialty  Internal Medicine  Surgery (primary)  Oncology     Code Status  FULL    Disposition  Rehab when medically clear    Review of Systems  Review of Systems    Constitutional: Positive for malaise/fatigue. Negative for chills and fever.        She stated that she has  Been feeling comfortable    HENT: Negative for hearing loss.         Dry lips/mouth   Eyes: Negative for blurred vision, double vision and photophobia.   Respiratory: Negative for cough and shortness of breath.    Cardiovascular: Positive for leg swelling. Negative for chest pain and orthopnea.   Gastrointestinal: Positive for diarrhea. Negative for abdominal pain, blood in stool, constipation, melena, nausea and vomiting.   Genitourinary: Negative for hematuria.   Musculoskeletal: Positive for myalgias. Negative for neck pain.   Neurological: Positive for weakness. Negative for dizziness, tingling and headaches.   Endo/Heme/Allergies: Does not bruise/bleed easily.   Psychiatric/Behavioral: Negative for substance abuse.   All other systems reviewed and are negative.       Physical Exam  Temp:  [36.3 °C (97.4 °F)-36.7 °C (98.1 °F)] 36.3 °C (97.4 °F)  Pulse:  [] 96  Resp:  [18] 18  BP: (103-119)/(61-64) 119/64  SpO2:  [93 %-94 %] 93 %    Physical Exam  Nursing note reviewed.   Constitutional:       General: She is not in acute distress.     Appearance: Normal appearance. She is ill-appearing.      Comments: Lethargic   HENT:      Head: Normocephalic and atraumatic.      Nose:      Comments: cortrak in place     Mouth/Throat:      Mouth: Mucous membranes are dry.   Eyes:      General: No scleral icterus.  Neck:      Comments: Central line RIJ c/d/i  Cardiovascular:      Rate and Rhythm: Normal rate and regular rhythm.   Pulmonary:      Comments: Auscultated from front,  CTAB  Abdominal:      General: Bowel sounds are normal. There is no distension.      Palpations: Abdomen is soft. There is no mass.      Tenderness: There is no abdominal tenderness.      Comments: Abdominal incision with suture clean/dry and intact   Musculoskeletal:         General: Swelling present.      Comments: anasarca   Skin:      Comments: Extremities warmer today   Neurological:      Mental Status: She is alert.      Motor: No weakness.       Noted to have dry mouth    Fluids    Intake/Output Summary (Last 24 hours) at 4/5/2020 0948  Last data filed at 4/5/2020 0900  Gross per 24 hour   Intake 100 ml   Output 1030 ml   Net -930 ml       Laboratory  Recent Labs     04/03/20  0240   WBC 2.3*   RBC 2.37*   HEMOGLOBIN 7.2*   HEMATOCRIT 23.7*   .0*   MCH 30.4   MCHC 30.4*   RDW 63.1*   PLATELETCT 160*   MPV 10.7     Recent Labs     04/02/20  2100 04/03/20  0240   SODIUM 141 142   POTASSIUM 4.1 3.9   CHLORIDE 111 115*   CO2 23 21   GLUCOSE 109* 115*   BUN 19 20   CREATININE 0.29* 0.34*   CALCIUM 7.1* 7.0*                   Imaging  DX-CHEST-LIMITED (1 VIEW)   Final Result      No pneumothorax is identified on the current exam.      DX-CHEST-LIMITED (1 VIEW)   Final Result      1.  Again seen lucency within the right lung base possibly representing a minimal basilar pneumothorax versus linear atelectasis. This is unchanged.      2.  Stable cardiomegaly.      US-EXTREMITY VENOUS LOWER BILAT   Final Result      DX-CHEST-LIMITED (1 VIEW)   Final Result      Increased right basilar atelectasis.      Small right pneumothorax appears to be present. Follow-up in 2 hours is recommended.      Blunting of the costophrenic angles may be related to trace pleural effusions.      Atherosclerotic plaque.      Findings called to the covering clinician.            DX-ABDOMEN FOR TUBE PLACEMENT   Final Result      Enteric feeding tube tip terminates over the expected region of the first portion of the duodenum.      DX-ABDOMEN FOR TUBE PLACEMENT   Final Result         Feeding tube with tip projecting over the expected area of the second portion duodenum.      [id number] VIEW   Final Result      NG tube tip overlies the gastric body.      DX-CHEST-PORTABLE (1 VIEW)   Final Result      1.  Interval extubation.   2.  Stable mild left basilar atelectasis  and/or consolidation.      DX-CHEST-PORTABLE (1 VIEW)   Final Result         1. Interval intubation, with well-positioned lines and tubes.   2. Mild left basilar atelectasis. No new consolidation or significant pleural effusions.         CT-ABDOMEN-PELVIS W/O   Final Result      1.  Dilated small intestine with decompression distally and decompression of the colon consistent with small bowel obstruction. There is pneumatosis involving dilated loops of small intestine consistent with bowel necrosis/ischemia.      2.  Small amount of ascites.      3.  Right lung base atelectasis/consolidation and small right pleural effusion.      This was discussed with ALIYAH TOSCANO at below the 6:00 AM on 3/21/2020.      DX-CHEST-PORTABLE (1 VIEW)   Final Result      1.  Interval placement of a right IJ central line without evidence of complication.      2.  Cardiomegaly.      DX-CHEST-PORTABLE (1 VIEW)   Final Result      Borderline cardiomegaly.           Assessment/Plan  * Perforation of intestine (HCC)- (present on admission)  Assessment & Plan  - CT scan of abdominal pelvis suggested bowel obstruction with pneumatosis involving the small bowels concerning for ischemia or necrosis.    - S/p ex lap with small bowel resection 3/21/20 with Dr. Alfred   ----> Noted to have large output from rectal tube , thus c-dif study was obtained which has been -ve .   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      Now on comfort measure , pending acceptance to [facility]        Anemia- (present on admission)  Assessment & Plan  Likely in setting of surgery, infection  Macrocytic  S/p 1 unit pRBC 3/29/20 for persistently low H/H (although stable, slightly low BPs)-->increased 8.4    Severe protein-calorie malnutrition (HCC)- (present on admission)  Assessment & Plan  Cortrak removed since patient is on comfort measure    Urinary retention- (present on admission)  Assessment & Plan  De La Garza in place    Thrombocytopenia (HCC)- (present on admission)  Assessment & Plan  I likely 2/2 sepsis, not actvely bleeding    No lab draw  , patient is on comfort measure    Hypokalemia- (present on admission)  Assessment & Plan  On comfort measure    Septic shock (HCC)- (present on admission)  Assessment & Plan  \"Resolved  This is Septic shock Present on admission  SIRS criteria identified on my evaluation include: Tachycardia, with heart rate greater than 90 BPM, Tachypnea, with respirations greater than 20 per minute and Leukopenia, with WBC less than 4,000  Source is Intra abdominal   Presentation includes: Severe sepsis present and initial Lactate level result is >= 4 mmol/L.   Despite appropriate fluid resuscitation with crystalloid given per sepsis guidelines, the patient remains hypotensive with systolic blood pressure less than 90 or MAP less than 65  Hemodynamic support with additional fluids and IV vasopressors as needed to maintain a SBP of 90 or MAP of 65  IV antibiotics as appropriate for source of sepsis  Reassessment: I have reassessed the patient's hemodynamic status\"    S/p IV Zosyn for 7 days for E-coli bacteremia  Urine cx growing E-coli   Culture from bowel surgery NGTD    Now on comfort measure , pending acceptance to [facility]      Pneumothorax  Assessment & Plan  Patient is noted to be short of breath, chest x-ray ordered---> noted to have right basilar pneumothorax.  " Chest x-ray repeated after 12 hours--->  Noted to have stable pneumothorax. Will make sure patient oxygenate 100% .     ---> repeat chest X-ray showed  Resolution of pneumothorax    Sacral wound  Assessment & Plan  Large sacral wound  Wound following, rectal tube  For comfort as patient is noted to have e lots of BM   Q 2 turns    Pancytopenia (HCC)  Assessment & Plan  Likely in setting of acute severe illness    Respiratory failure following trauma and surgery (HCC)- (present on admission)  Assessment & Plan  On  2 L of O2    Acute kidney injury (HCC)- (present on admission)  Assessment & Plan  Resolved  Likely secondary to sepsis and septic shock.  Fluid and hemodynamics resuscitation  Avoid nephrotoxins  Renal dose all medication       VTE prophylaxis: Lovenox      4/3/2020  After an extensive discussion with the patient/ family by Palliative team----> patient decided to go for comfort measure. She wish to drink coke and wanted to have cortrak removed.  Comfort measure will be provided.    4/4/2020  Patient is noted to be comfortable,  And will provide comfort measure only at this time   Palliative  Has been following and will follow their recs , pending [facility]   Discussed the plan of care with family   No lab draw    4/5/2020  She looks comfortable , when asked  At am eval she  Denied  Any pain  Will continue comfort care measure

## 2020-04-05 NOTE — PROGRESS NOTES
Oncology/Hematology Progress Note               Author: Katie Abreu M.D. Date & Time created: 4/5/2020  12:11 PM   Diagnosis-rectal adenocarcinoma status post combined chemoradiation therapy as neoadjuvant treatment  Small bowel obstruction  Persistent diarrhea C. difficile negative  Interval History:  Remains lethargic.  He appears to have resolved.  Oral intake remains poor poor.  Oral mucositis is  better    Review of Systems:  Review of Systems   Constitutional: Positive for malaise/fatigue and weight loss.   Respiratory: Negative for cough and hemoptysis.    Cardiovascular: Negative for chest pain, palpitations and orthopnea.   Gastrointestinal: Positive for abdominal pain. Negative for diarrhea and heartburn.   Genitourinary: Negative for dysuria and hematuria.       Physical Exam:  Physical Exam  Constitutional:       General: She is not in acute distress.     Appearance: She is ill-appearing.      Comments: Lethargic   Cardiovascular:      Rate and Rhythm: Normal rate and regular rhythm.      Pulses: Normal pulses.      Heart sounds: No murmur. No gallop.    Abdominal:      General: There is no distension.      Palpations: There is no mass.      Tenderness: There is abdominal tenderness. There is no rebound.      Hernia: No hernia is present.   Musculoskeletal:         General: No deformity.      Right lower leg: Edema present.      Left lower leg: Edema present.   Skin:     Coloration: Skin is not jaundiced.      Findings: Bruising present.   Neurological:      Mental Status: She is alert.         Labs:          Recent Labs     04/02/20  2100 04/03/20  0240   SODIUM 141 142   POTASSIUM 4.1 3.9   CHLORIDE 111 115*   CO2 23 21   BUN 19 20   CREATININE 0.29* 0.34*   CALCIUM 7.1* 7.0*     Recent Labs     04/02/20  2100 04/03/20  0240   GLUCOSE 109* 115*     Recent Labs     04/03/20  0240   RBC 2.37*   HEMOGLOBIN 7.2*   HEMATOCRIT 23.7*   PLATELETCT 160*     Recent Labs     04/03/20  0240   WBC 2.3*    NEUTSPOLYS 80.90*   LYMPHOCYTES 10.40*   MONOCYTES 7.00   EOSINOPHILS 0.00   BASOPHILS 0.00     Recent Labs     20  2100 20  0240   SODIUM 141 142   POTASSIUM 4.1 3.9   CHLORIDE 111 115*   CO2 23 21   GLUCOSE 109* 115*   BUN 19 20   CREATININE 0.29* 0.34*   CALCIUM 7.1* 7.0*     Hemodynamics:  Temp (24hrs), Av.6 °C (97.8 °F), Min:36.3 °C (97.4 °F), Max:36.7 °C (98.1 °F)  Temperature: 36.3 °C (97.4 °F)  Pulse  Av.2  Min: 43  Max: 115   Blood Pressure : 119/64     Respiratory:    Respiration: 18, Pulse Oximetry: 93 %     Work Of Breathing / Effort: Mild  RUL Breath Sounds: Clear, RML Breath Sounds: Diminished, RLL Breath Sounds: Diminished, DAVON Breath Sounds: Clear, LLL Breath Sounds: Diminished  Fluids:    Intake/Output Summary (Last 24 hours) at 2020 1322  Last data filed at 2020 1120  Gross per 24 hour   Intake 1280 ml   Output 2100 ml   Net -820 ml        GI/Nutrition:  No orders of the defined types were placed in this encounter.    Medical Decision Making, by Problem:  Active Hospital Problems    Diagnosis   • *Perforation of intestine ([facility]) [K63.1]   • Hypernatremia [E87.0]   • Bacteremia due to Escherichia coli [R78.81, B96.20]   • Rectal cancer ([facility]) [C20]   • Severe protein-calorie malnutrition ([facility]) [E43]   • Anemia [D64.9]   • Urinary retention [R33.9]   • Thrombocytopenia ([facility]) [D69.6]   • Septic shock ([facility]) [A41.9, R65.21]   • Hypokalemia [E87.6]   • Respiratory failure following trauma and surgery ([facility]) [J95.821]   • Acute kidney injury ([facility]) [N17.9]   • Pneumothorax [J93.9]   • Sacral wound [S31.000A]   • Hypophosphatemia [E83.39]   • Pancytopenia ([facility]) [D61.818]       Plan:  Diarrhea-C. difficile negative. Not on Sandostattin.Changed her Imodium to liquid form.  Diarrhea is much better.                                           Rectal adenocarcinoma-has completed neoadjuvant chemoradiation therapy.  Gnosis appears poor.  Pain the patient is currently on morphine sulfate  and will change the dosing of morphine sulfate to every 3 hourly basis.  Will sign off.  Reconsult as needed  Quality-Core Measures

## 2020-04-05 NOTE — PROGRESS NOTES
"Bedside report received.  Assessment complete.  A&O x 4. Patient calls appropriately.  Patient is on comfort care, unable to ambulate / Q2 turns. Bed alarm off.   Patient has 8/10 pain. Pain managed with prescribed medications.  Denies N&V. No diet, only able to tolerate sips of liquids.  Surgical site JONATHAN steri strips.  De La Garza to void, + flatus, rectal tube BM.  Patient denies SOB.  Patient is calm and pleasant.  Review plan with of care with patient. Call light and personal belongings with in reach. Hourly rounding in place. All needs met at this time.  /64   Pulse 96   Temp 36.3 °C (97.4 °F) (Temporal)   Resp 18   Ht 1.6 m (5' 3\") Comment: drivers license  Wt 76.8 kg (169 lb 5 oz)   SpO2 93%   BMI 29.99 kg/m²     "

## 2020-04-05 NOTE — PROGRESS NOTES
Surgery    Patient discussed with bedside nurse.    Status post lysis of adhesions and SBR >2 weeks ago    Staples out yesterday  Tolerating tube feeding    No active surgical issues: Please contact us if there is any question or concern

## 2020-04-06 PROBLEM — Z51.5 COMFORT MEASURES ONLY STATUS: Status: ACTIVE | Noted: 2020-01-01

## 2020-04-06 NOTE — PROGRESS NOTES
Spiritual Care Note    Patient's Name: Elle Barksdale   MRN: [id number]    YOB: 1940   Age and Gender: 80 y.o. female   Service Area: [facility]   Room (and Bed): [place] [address]   Ethnicity or Nationality: White   Primary Language: English   Hoahaoism/Spiritual preference: None   Place of Residence: [facility]   Family/Friends/Others Present: No   Clinical Team Present: No   Medical Diagnosis(-es)/Procedure(s): Septic Shock   Code Status: Comfort Care/DNR    Date of Admission: 3/21/2020   Length of Stay: 16 days        Spiritual Care Provider Information    Name of Spiritual Care Provider:   Yohana Verma  Title of Spiritual Care Provider:     Phone Number:     [phone]  E-mail:      [email or web address]  Total Time:      5 minutes      Spiritual Screen Results    Gen Nursing    Is your spiritual health or inner well-being important to you as you cope with your medical condition?: No  Would you like to receive a visit from our Spiritual Care team or your own Presybeterian or spiritual leader?: No  Was spiritual care education provided to the patient?: Declined     Palliative Care    Is your spiritual health or inner well-being important to you as you cope with your medical condition?: Yes  Would you like to receive a visit from our Spiritual Care team or your own Presybeterian or spiritual leader?: No  Was spiritual care education provided to the patient?: Declined      Encounter/Request Information    Visited With: Patient, Refused care    Referral From/ Origin of Request: Epic nursing      Notes:    Thank you for allowing Spiritual Care to support this patient and family. Contact [phone] for additional assistance, changes in PT status, or with any questions/concerns.

## 2020-04-06 NOTE — PROGRESS NOTES
Pain has been controlled with ordered medications. Patient refuses turns and oral care at this time. Patient is comfort measures. All efforts made to keep patient comfortable.

## 2020-04-06 NOTE — PROGRESS NOTES
2 RN skin check refused by patient.  Patient is comfort measures.    Patient on low air loss bariatric bed; heels floated off pillows; Patient refuses Q2T at times. All efforts made to keep patient comfortable.

## 2020-04-06 NOTE — PROGRESS NOTES
Palliative Care Follow-up  Case discussed with clinical team. Pt unable to return home as pt was living independently.  Family unable to meet care needs.  Exploring GHs for discharge with hospice.      Updated: [facility]. [facility]    Plan: [facility] and [facility] Care Management working on discharge plan for to  with Hospice.     Thank you for allowing Palliative Care to support this patient and family. Contact [phone] for additional assistance, change in patient status, or with any questions/concerns.

## 2020-04-06 NOTE — PROGRESS NOTES
Hospital Medicine Daily Progress Note    Date of Service  4/6/2020    Chief Complaint  80 y.o. female admitted 3/21/2020 with abdominal pain    Hospital Course    80 y.o. female with a h/o rectal cancer s/p chemoradiation who presented 3/21/2020 with worsening abdominal pain.  CT of the abdomen and pelvis showed small bowel obstruction with pneumatosis involving dilated loops.   She was admitted to the ICU and started on pressors.  She underwent exploratory laparotomy and small bowel resection on 3/21/2020 with Dr. Interiano.  She was extubated on 3/22/20.  She had positive blood and urine cultures for E. Coli, pansensitive.  She was given zosyn x 8 days.     Patient then transferred to surgical floor, surgery continue to follow the patient.  She is noted to have increased amount of output from her rectal tube, C. difficile study has been checked and noted to be negative this is started on Imodium as per Dr. Interiano recommendation.      Chest X-ray  Initially showed Pneumothorax on 4/2/2020 but on 4/3/2020--> no pneumo     I went to bedside and discussed the CODE STATUS of the patient on 4/3/2020---> she expressed her wish to be DNR.  Palliative came and evaluated the patient, patient stated that he wanted to become comfortable, and she wanted to go home and meet her family. Thus comfort care orders has been initiated. Cortrak has been removed and patient will be provided whatever she would like to eat.     Patient is noted to be comfortable and all measure to make patient comfortable has been in place. Patient/family choose [facility] and patient will be likely going to Group home on home hospice.    She has been having lost of loose bowel movement and does have rectal tube for comfort          Interval Problem Update    4/3/2020  After an extensive discussion with the patient/ family by Palliative team----> patient decided to go for comfort measure. She wish to drink coke and wanted to have cortrak removed.  Comfort  measure will be provided.    4/4/2020  Patient is noted to be comfortable,  And will provide comfort measure only at this time   Palliative  Has been following and will follow their recs , pending [facility]   Discussed the plan of care with family   No lab draw    4/5/2020  She looks comfortable , when asked  At am eval she  Denied  Any pain  Will continue comfort care measure     4/6/2020  Noted comfortable   Palliative/hospice following   Likely going to group home on home hospice      Consultants/Specialty  Internal Medicine  Surgery (primary)  Oncology     Code Status  FULL    Disposition  Rehab when medically clear    Review of Systems  Review of Systems   Constitutional: Positive for malaise/fatigue. Negative for chills and fever.        She stated that she has  Been feeling comfortable    HENT: Negative for hearing loss.         Dry lips/mouth   Eyes: Negative for blurred vision, double vision and photophobia.   Respiratory: Negative for cough and shortness of breath.    Cardiovascular: Positive for leg swelling. Negative for chest pain and orthopnea.   Gastrointestinal: Positive for diarrhea. Negative for abdominal pain, blood in stool, constipation, melena, nausea and vomiting.   Genitourinary: Negative for hematuria.   Musculoskeletal: Positive for myalgias. Negative for neck pain.   Neurological: Positive for weakness. Negative for dizziness, tingling and headaches.   Endo/Heme/Allergies: Does not bruise/bleed easily.   Psychiatric/Behavioral: Negative for substance abuse.   All other systems reviewed and are negative.       Physical Exam  Temp:  [36.1 °C (97 °F)-36.7 °C (98 °F)] 36.1 °C (97 °F)  Pulse:  [109-111] 109  Resp:  [15-18] 15  BP: (109-117)/(69-72) 117/72  SpO2:  [93 %] 93 %    Physical Exam  Nursing note reviewed.   Constitutional:       General: She is not in acute distress.     Appearance: Normal appearance. She is ill-appearing.      Comments: Lethargic   HENT:      Head: Normocephalic  and atraumatic.      Nose:      Comments: cortrak in place     Mouth/Throat:      Mouth: Mucous membranes are dry.   Eyes:      General: No scleral icterus.  Neck:      Comments: Central line RIJ c/d/i  Cardiovascular:      Rate and Rhythm: Normal rate and regular rhythm.   Pulmonary:      Comments: Auscultated from front,  CTAB  Abdominal:      General: Bowel sounds are normal. There is no distension.      Palpations: Abdomen is soft. There is no mass.      Tenderness: There is no abdominal tenderness.      Comments: Abdominal incision with suture clean/dry and intact   Musculoskeletal:         General: Swelling present.      Comments: anasarca   Skin:     Comments: Extremities warmer today   Neurological:      Mental Status: She is alert.      Motor: No weakness.       Noted to have dry mouth    Fluids    Intake/Output Summary (Last 24 hours) at 4/6/2020 1424  Last data filed at 4/6/2020 0730  Gross per 24 hour   Intake 75 ml   Output 840 ml   Net -765 ml       Laboratory                        Imaging  DX-CHEST-LIMITED (1 VIEW)   Final Result      No pneumothorax is identified on the current exam.      DX-CHEST-LIMITED (1 VIEW)   Final Result      1.  Again seen lucency within the right lung base possibly representing a minimal basilar pneumothorax versus linear atelectasis. This is unchanged.      2.  Stable cardiomegaly.      US-EXTREMITY VENOUS LOWER BILAT   Final Result      DX-CHEST-LIMITED (1 VIEW)   Final Result      Increased right basilar atelectasis.      Small right pneumothorax appears to be present. Follow-up in 2 hours is recommended.      Blunting of the costophrenic angles may be related to trace pleural effusions.      Atherosclerotic plaque.      Findings called to the covering clinician.            DX-ABDOMEN FOR TUBE PLACEMENT   Final Result      Enteric feeding tube tip terminates over the expected region of the first portion of the duodenum.      DX-ABDOMEN FOR TUBE PLACEMENT   Final Result          Feeding tube with tip projecting over the expected area of the second portion duodenum.      LG-ENVUAFY-3 VIEW   Final Result      NG tube tip overlies the gastric body.      DX-CHEST-PORTABLE (1 VIEW)   Final Result      1.  Interval extubation.   2.  Stable mild left basilar atelectasis and/or consolidation.      DX-CHEST-PORTABLE (1 VIEW)   Final Result         1. Interval intubation, with well-positioned lines and tubes.   2. Mild left basilar atelectasis. No new consolidation or significant pleural effusions.         CT-ABDOMEN-PELVIS W/O   Final Result      1.  Dilated small intestine with decompression distally and decompression of the colon consistent with small bowel obstruction. There is pneumatosis involving dilated loops of small intestine consistent with bowel necrosis/ischemia.      2.  Small amount of ascites.      3.  Right lung base atelectasis/consolidation and small right pleural effusion.      This was discussed with ALIYAH TOSCANO at below the 6:00 AM on 3/21/2020.      DX-CHEST-PORTABLE (1 VIEW)   Final Result      1.  Interval placement of a right IJ central line without evidence of complication.      2.  Cardiomegaly.      DX-CHEST-PORTABLE (1 VIEW)   Final Result      Borderline cardiomegaly.           Assessment/Plan  * Comfort measures only status  Assessment & Plan  After an extensive discussion with the patient/ family by Palliative team----> patient/family decided to go for comfort measure. Palliative/Hospice team following. Likely going to Group home on [facility]  Comfort measure will be provided.        Hypernatremia- (present on admission)  Assessment & Plan   Resolved , on comfort measure    Bacteremia due to Escherichia coli- (present on admission)  Assessment & Plan  S/p 7 days zosyn  Source: Likely Urinary tract infection      Perforation of intestine (HCC)- (present on admission)  Assessment & Plan  - CT scan of abdominal pelvis suggested bowel obstruction with  "pneumatosis involving the small bowels concerning for ischemia or necrosis.    - S/p ex lap with small bowel resection 3/21/20 with Dr. Alfred   ----> Noted to have large output from rectal tube , thus c-dif study was obtained which has been -ve .   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      Now on comfort measure ,  Likely going to group home on [facility]      Anemia- (present on admission)  Assessment & Plan  Likely in setting of surgery, infection  Macrocytic  S/p 1 unit pRBC 3/29/20 for persistently low H/H (although stable, slightly low BPs)-->increased 8.4    Severe protein-calorie malnutrition (HCC)- (present on admission)  Assessment & Plan  Cortrak removed since patient is on comfort measure    Urinary retention- (present on admission)  Assessment & Plan  De La Garza in place    Thrombocytopenia (HCC)- (present on admission)  Assessment & Plan  I likely 2/2 sepsis, not actvely bleeding    No lab draw  , patient is on comfort measure    Hypokalemia- (present on admission)  Assessment & Plan  On comfort measure    Septic shock (HCC)- (present on admission)  Assessment & Plan  \"Resolved  This is Septic shock Present on admission  SIRS criteria identified on my evaluation include: Tachycardia, with heart rate greater than 90 BPM, Tachypnea, with respirations greater than 20 per minute and Leukopenia, with WBC less than 4,000  Source is Intra abdominal   Presentation includes: Severe sepsis present and initial Lactate level result is >= 4 mmol/L.   Despite appropriate fluid resuscitation with crystalloid given per sepsis guidelines, the patient remains hypotensive with systolic blood pressure less than 90 or MAP less than " "65  Hemodynamic support with additional fluids and IV vasopressors as needed to maintain a SBP of 90 or MAP of 65  IV antibiotics as appropriate for source of sepsis  Reassessment: I have reassessed the patient's hemodynamic status\"    S/p IV Zosyn for 7 days for E-coli bacteremia  Urine cx growing E-coli   Culture from bowel surgery NGTD    Now on comfort measure , going to [facility]      Pneumothorax  Assessment & Plan  Patient is noted to be short of breath, chest x-ray ordered---> noted to have right basilar pneumothorax.  Chest x-ray repeated after 12 hours--->  Noted to have stable pneumothorax. Will make sure patient oxygenate 100% .     ---> repeat chest X-ray showed  Resolution of pneumothorax    Sacral wound  Assessment & Plan  Large sacral wound  Wound following, rectal tube  For comfort as patient is noted to have e lots of BM   Q 2 turns    Pancytopenia (HCC)  Assessment & Plan  Likely in setting of acute severe illness    Respiratory failure following trauma and surgery (HCC)- (present on admission)  Assessment & Plan  On  2 L of O2    Acute kidney injury (HCC)- (present on admission)  Assessment & Plan  Resolved  Likely secondary to sepsis and septic shock.  Fluid and hemodynamics resuscitation  Avoid nephrotoxins  Renal dose all medication       VTE prophylaxis: Lovenox        "

## 2020-04-06 NOTE — DIETARY
Nutrition Services: Update    Pt transitioning to comfort care measures. Tube feeding discontinued, Cortrak removed. Diet per pt preference.    RD available PRN.

## 2020-04-06 NOTE — DISCHARGE PLANNING
This RN CM will continue to follow the case and assist with discharge planning as needed. Pt is now on Comfort Care.     This RN CM reached out to [facility] RN to verify how I can assist Pt for discharge. Per KODAK Ferguson , Pt does not qualify for [facility]. Will reach out to Pt's daughter, Nerissa Barksdale about discharge plan.     12:55 This RN CM spoke with Nerissa Barksdale, Pt's daughter about her Plan where to discharge Pt. Per Nerissa she will call me back as she is busy now.

## 2020-04-07 NOTE — WOUND TEAM
[facility]  Inpatient Services  Wound and Skin Care Progress Note    Admission Date: 3/21/2020     Last order of IP CONSULT TO WOUND CARE was found on 3/21/2020 from Hospital Encounter on 3/21/2020     HPI, PMH, SH: Reviewed    Unit where seen by Wound Team: [id number]     WOUND CONSULT/FOLLOW UP RELATED TO:  Buttocks wounds      Self Report / Pain Level:  No complaints of pain        OBJECTIVE:  On PRADIP bed, rectal tube still in place.  Dr. Interiano and Hospitalist OK'd rectal tube.  On comfort care now.      WOUND TYPE, LOCATION, CHARACTERISTICS (Pressure Injuries: location, stage, POA or date identified)        Wound 03/21/20 Radiation Buttocks;Rectum;Sacrum;Coccyx;Groin full thickness to sacrum/coccyx, partial thickness to buttocks (Active)   Wound Image      Site Assessment Yellow;Red;Pink \   Periwound Assessment Denuded    Margins Attached edges    Closure None    Drainage Amount Large    Drainage Description Yellow;Purulent    Treatments Cleansed;Site care    Wound Cleansing Normal Saline Irrigation    Periwound Protectant Stoma Powder;Skin Protectant Wipes to Periwound    Dressing Cleansing/Solutions Not Applicable    Dressing Options Hydrofiber Silver;Mepilex    Dressing Changed New    Dressing Status Clean;Dry;Intact    Dressing Change/Treatment Frequency Every 48 hrs, and As Needed    NEXT Dressing Change/Treatment Date 04/08/20    NEXT Weekly Photo (Inpatient Only) 04/13/20    Non-staged Wound Description Full thickness    Wound Length (cm) 11 cm    Wound Width (cm) 11 cm    Wound Surface Area (cm^2) 121 cm^2    WOUND NURSE ONLY - Time Spent with Patient (mins) 60        Vascular:    ARACELI:   No results found.    Lab Values:    Lab Results   Component Value Date/Time    WBC 2.3 (LL) 04/03/2020 02:40 AM    RBC 2.37 (L) 04/03/2020 02:40 AM    HEMOGLOBIN 7.2 (L) 04/03/2020 02:40 AM    HEMATOCRIT 23.7 (L) 04/03/2020 02:40 AM    CREACTPROT 14.08 (H) 03/30/2020 03:59 AM      Culture Results show:  Recent  Results (from the past 720 hour(s))   CULTURE WOUND W/ GRAM STAIN    Collection Time: 03/21/20  2:16 PM   Result Value Ref Range    Significant Indicator NEG     Source WND     Site Peritoneal Fluid     Culture Result No growth at 72 hours.     Gram Stain Result Few WBCs.  No organisms seen.          INTERVENTIONS BY WOUND TEAM:  Patient helped to turn self to right side with RN.  Removed previous dressing, cleansed with NS and gauze.  Nasal trumpet as rectal tube working well at this time.  Wrapped port with wash cloth.  Cleansed skin with warm wash cloths and barrier wipes.  Crusted wound and kody-wound with no sting and stoma powder.  Covered with whole sheet of aqaucel AG/hydrofiber silver and secured sacral mepilex.  Linens changed, patient supine at this time.  Heel float boots in place.           Interdisciplinary consultation: Patient, Bedside RN (Kylah)     EVALUATION: patient with rectal cancer.  Admitted with SBO, micro perf.  NG in place.  Having pain and issues with frequent watery stool.  Dr. Interiano ok'd rectal tube, hopefully will subside in 1-2 days.  Now comfort care.  Will follow weekly.      Goals: Steady decrease in wound area and depth weekly.    NURSING PLAN OF CARE ORDERS (X):    Dressing changes: See Dressing Care orders: X  Skin care: See Skin Care orders: X  Rectal tube care: See Rectal Tube Care orders:   Other orders:    WOUND TEAM PLAN OF CARE:   Dressing changes by wound team:          Follow up 1-2 times weekly:             X Sunday/monday   Follow up 3 times weekly:                NPWT change 3 times weekly:     Follow up as needed:       Other (explain):     Anticipated discharge plans:   LTACH:        SNF/Rehab:                  Home Care:         Will need ongoing wound care post DC/hospice   Outpatient Wound Center:            Self Care:

## 2020-04-07 NOTE — CARE PLAN
Problem: Communication  Goal: The ability to communicate needs accurately and effectively will improve  Outcome: PROGRESSING AS EXPECTED     Problem: Safety  Goal: Will remain free from injury  Outcome: PROGRESSING AS EXPECTED     Problem: Venous Thromboembolism (VTW)/Deep Vein Thrombosis (DVT) Prevention:  Goal: Patient will participate in Venous Thrombosis (VTE)/Deep Vein Thrombosis (DVT)Prevention Measures  Outcome: PROGRESSING AS EXPECTED     Problem: Pain Management  Goal: Pain level will decrease to patient's comfort goal  Outcome: PROGRESSING AS EXPECTED     Problem: Skin Integrity  Goal: Risk for impaired skin integrity will decrease  Outcome: PROGRESSING AS EXPECTED     Problem: Mobility  Goal: Risk for activity intolerance will decrease  Outcome: PROGRESSING AS EXPECTED

## 2020-04-07 NOTE — PROGRESS NOTES
2 RN skin assessment completed with KODAK Galvez wound team  Wound to sacrum- picture taken dressing changed per wound care team orders  Rectal tube in place  De La Garza catheter in place  Redness and excoriation to perineum and groin area- barrier paste applied  Lips scabbed, bleeding at times- lip balm and Vaseline applied per pt request  abd midline with steristrip  Blanchable redness to left elbow  Bilateral elbows elevated on pillows  Q2T in place patient does refuse at times  Patient on bariatric low air loss mattress  Heel protectors in place  Elbow meplix protectors in place  Silcone oxygen tubing in use with grey foam ear protectors when patient wears it.    Blanchable redness to right medial ankle/foreshin area  Redness under breasts bilaterally- interdry in place  Redness to ambominal skin fold- interdry in place    Patient is comfort measures all measures are made to keep patient comfortable while still encouraging mobilization and dressing changes

## 2020-04-07 NOTE — ASSESSMENT & PLAN NOTE
After an extensive discussion with the patient/ family by Palliative team----> patient/family decided to go for comfort measure. Palliative/Hospice team following. Likely going to Group home on [facility]  Comfort measure will be provided.

## 2020-04-07 NOTE — DISCHARGE PLANNING
Anticipated Discharge Disposition: TBD    Action: DICK spoke with Nerissa, patient's daughter.  Per Nerissa, she is not actively searching for a group home as it was her understanding the patient will remain at [facility] as [facility].    DICK spoke with KODAK Stacy with [facility].  Per Regis, at this time, this patient does not meet criteria for inpatient GIP. Regis states [facility] will inform Nerissa that the patient does not meet criteria for GIP.  Per Regis, [facility] is not actively searching for a GH; however, the list of group homes was left today in the patient's room for the patient's daughter to review it and decide if she wants to pursue GH.    Barriers to Discharge: Lack of family support at home.    Plan: As Above.  Possible GH and Hospice, pending the patient's daughter agreeing to GH.

## 2020-04-07 NOTE — PALLIATIVE CARE
Palliative Care follow-up  Spoke with Dr Zuniga who reviewed and signed pts POLST. POLST scanned into EMR, original placed on pts hard chart.     Thank you for allowing Palliative Care to support this patient and family. Contact [phone] for additional assistance, change in patient status, or with any questions/concerns.

## 2020-04-08 NOTE — THERAPY
Spoke w/ nsg.  Pt in significant pain.  Requires multiple staff assist for q2 turns.  Now on comfort care.  At this time, PT would not be indicated.  PT will not formally follow.  We will be available for d/c needs only.

## 2020-04-08 NOTE — H&P
"Hospice H&P  Author: Anila Lopes D.O.    Date & Time note created:    4/8/2020   2:05 PM       Date of Consult: 04/08/20  Requesting Physician:   Consulting Physician: Anila Lopes D.O. DO  Reason for Consult: Uncontrolled pain and high level of nursing care in a comfort-care patient    History of Present Illness:    Height: 5'3\"   Weight: 169   BMI: 29.9   PPS Score: 20%   FAST Scale: NA     Ms Elle Barksdale is an 80 year old female with rectal adenocarcinoma. Prior functional status was ambulatory receiving chemo & radiation. Current functional status is PPS 20%.  Patient was recently hospitalized 3/21/20 at [facility] for abdominal pain, distention, intractable nausea and vomiting. CT findings were consistent with obstruction. The patient underwent a laparotomy and lysis of adhesions and small bowel resection on that day. Since then, she has also developed a decubitus and has had severe diarrhea.  The diarrhea has not resolved with medications & currently has a rectal tube. C-diff negative.  Pt is no longer eating, & only taking sips of PO fluids. Patient has generalized edema with 2+ in lower extremities.  Daughter, Nerissa, has noticed an increase in pain from yesterday. This morning Elle was crying in pain after IV dose of morphine, then she got an oral dose of morphine, which was ineffective.  Pt then received another dose of IV morphine with some pain relief results. Nerissa & staff have noticed patient having a lot of pain when she is being turned in bed.         Daughter is a respiratory therapist at [employer].  She doesn't want her mom to suffer anymore.     Social History   Current Tobacco Use   Number Pack Years: 30   Quit Date: 30 years ago   Alcohol Consumption   Current: none   Past Use: none   Substance Use - Current/Past   Illicit Drugs: none   Marijuana:  none   Prescription Medications: none       Past Medical History:   Past Medical History:   Diagnosis Date   • Acute pain of left " "knee 10/26/2017   • Leg swelling 10/26/2017   • Osteoarthritis 2018   • Rectal cancer (HCC)    • Right knee injury 2013       Past Surgical History:  Past Surgical History:   Procedure Laterality Date   • PB EXPLORATORY OF ABDOMEN N/A 3/21/2020    Procedure: LAPAROTOMY, EXPLORATORY, lysis of adhesions, repair of perforation, stricteroplasty;  Surgeon: Pamela Interiano M.D.;  Location: SURGERY [facility];  Service: Gen Robotic   • CATARACT PHACO WITH IOL  2014    Performed by Samuel Vega M.D. at [facility]   • CATARACT PHACO WITH IOL  2013    Performed by Samuel Vega M.D. at [facility]   • APPENDECTOMY     • COLONOSCOPY WITH BIOPSY     • TONSILLECTOMY         Current Outpatient Medications:  No current facility-administered medications on file prior to encounter.      Current Outpatient Medications on File Prior to Encounter   Medication Sig Dispense Refill   • acetaminophen (TYLENOL) 325 MG Tab Take 650 mg by mouth every four hours as needed.         Medication Allergy/Sensitivities:  No Known Allergies    Family History:  Cancer-related family history includes Cancer in her father.    Social History:  Social History     Tobacco Use   • Smoking status: Former Smoker     Packs/day: 1.00     Years: 30.00     Pack years: 30.00     Types: Cigarettes     Last attempt to quit: 1990     Years since quittin.2   • Smokeless tobacco: Never Used   Substance Use Topics   • Alcohol use: Yes     Comment: rarely; 2 glasses of wine/month    • Drug use: No       Vitals:  Weight/BMI: Body mass index is 29.94 kg/m².  Ht 1.6 m (5' 2.99\")   Wt 76.7 kg (169 lb)   BMI 29.94 kg/m²   Vitals:    20 1300   Weight: 76.7 kg (169 lb)   Height: 1.6 m (5' 2.99\")       Lab Data Review:  No results found for this or any previous visit (from the past 24 hour(s)).    Imaging/Procedures Review:    No orders to display          Problem List:  1. Stage IIa rectal adenocarcinoma " with metastasis s/p chemo and radiation  2. Cancer related pain  3. Small bowel obstruction s/p ex lab with resection 3/21/20  4. Anemia  5. Severe protein-calorie malnutrition  6. Thrombocytopenia  7. Hypokalemia  8. Pneumothorax    Hospice general inpatient DNR/DNI      Discussion: This patient requires a high level of nursing care for pain control and symptom management. The patient is being admitted to hospice general inpatient to manage symptoms of pain, dyspnea and agitation.    We will start Morphine 5-10 mg IV Q1 hour PRN moderate to severe pain or SOB. We will also start Lorazepam 1 mg IV Q4 hours PRN anxiety/ agitation.     The patient will be followed by [facility] team on a daily basis to assess for symptom control as well as appropriateness for GIP level of care. All findings have been reviewed with hospice medical director.      Anila Lopes D.O.  Hospice and Palliative Medicine  04/08/20

## 2020-04-08 NOTE — DISCHARGE PLANNING
Per Chart, [facility] to speak to Pt's daughter, Nerissa and inform her that Pt does not meet criteria for In Patient GIP.    09:21This RN SEGUNDO left a VM to Pt's daughter, Nerissa Barksdale if she was able to review possible Group Homes for Pt to discharge. Pt remains on Comfort Care.    10:55 This RN SEGUNDO tried to call Pt's daughter again but she did not . Will follow up.    12:32 Per Regis CANDELARIO of [facility] , Pt has been approved for GIP by Dr Lopes. Left a message to Nerissa, Pt's daughter for the consent.    Per Regis, he can meet with Nerissa to get the consent signed.

## 2020-04-08 NOTE — PROGRESS NOTES
Assumed care of pt at 0745. Report received and bedside rounding completed with night RN. Pt in no  acute distress noted.  Comfort measures provided. Turning pt q2hrs.    Fall precautions in place, - Treaded non slip socks. Bed locked. Communication board updated with POC. Call light and pt belongings within reach - pt makes needs known - hourly rounding in place. See flowsheets for further assessment.

## 2020-04-08 NOTE — CARE PLAN
Problem: Pain Management  Goal: Pain level will decrease to patient's comfort goal  Outcome: PROGRESSING AS EXPECTED  Intervention: Follow pain managment plan developed in collaboration with patient and Interdisciplinary Team  Note: Iv PRN medication per MAR. Heat packs applied to abd.     Problem: Mobility  Goal: Risk for activity intolerance will decrease  Outcome: PROGRESSING SLOWER THAN EXPECTED  Intervention: Assess and monitor signs of activity intolerance  Note: Pt is total assist. On a low airloss bed. Q2 turning. Using extra pillows for floating.

## 2020-04-09 NOTE — PROGRESS NOTES
Shift [id number]    Pt Comfort Care, transitioned to inpatient hospice this afternoon. Pt grimacing and moaning in pain with repositioning, premedicating with IV morphine with effect. IV ativan available PRN. Turning q2hrs and PRN. Pressure ulcer to coccyx, mepilex foam in place. Rectal tube with mod amounts of brown liquid drainage. De La Garza catheter in place, draining adequate amounts of jovanni urine. Old midline abd ex lap site with steri strips JONATHAN, healing. Interdry to abd folds. Scopolamine patch behind L ear. All safety maintained.

## 2020-04-09 NOTE — PROGRESS NOTES
"Vitals this morning were 98/64; 108 pulse and 32 resp. Patient stated yes when asked if she wanted pain medication. Patient given 5 mg Morphine IV as ordered PRN by the MD. Patient expressed \"relief\" shortly after morphine was administered by RN. Respirations slowed to 24 and pt looked as RN and smiled. Will continue to monitor patient closely for any changes in condition and will pass on information to oncoming dayshift. Charge RN has been made aware throughout shift of patient's status, interventions done for patient by RN and plan of care for patient.  "

## 2020-04-10 ENCOUNTER — HOME CARE VISIT (OUTPATIENT)
Dept: HOSPICE | Facility: HOSPICE | Age: 80
End: 2020-04-10
Payer: MEDICARE

## 2020-04-10 NOTE — PROGRESS NOTES
Pt  with family at bedside. 2 RNs pronounced death. MD Dr. Lopes unable to reach called x2, voicemail left on secure line.   hospitalist Dr. Lyon on unit, , and tissue donation notified. Pt is not a  case. No autopsy requested. Belongings sent with family. Pt does have dentures in place. All lines and drains removed. postmortem care performed. Pt placed in body bag and [facility] called to take body to morgue.

## 2020-04-10 NOTE — PROGRESS NOTES
Pt Comfort Care/inpatient hospice. Pt grimacing and moaning in pain with repositioning this am, premedicating with IV morphine with effect. Started on Morphine gtt this afternoon with great effect. IV ativan given x1 for anxiety with effect. Pt somnolent this afternoon and apneic.  Pressure ulcer to coccyx, draining lrg amounts of foul smelling purulent green/yellow drainage. Hydrofera silver and mepilex foam dressing changed this shift. See 2 RN skin check for more details.  Turning q2hrs and PRN. Rectal tube with small amounts of brown liquid drainage. De La Garza catheter in place, draining small amounts of jovanni urine. Old midline abd ex lap site with steri strips JONATHAN, healing. Interdry to abd folds. Scopolamine patch behind L ear. All safety maintained.

## 2020-04-10 NOTE — CARE PLAN
Problem: Pain Management  Goal: Pain level will decrease to patient's comfort goal  Outcome: PROGRESSING AS EXPECTED  Note: Educated pt's family on indication for starting morphine PCA     Problem: Respiratory:  Goal: Respiratory status will improve  Outcome: PROGRESSING AS EXPECTED  Note: Educated pt's family on dying process and apnea

## 2020-04-10 NOTE — PROGRESS NOTES
2 RN skin assessment completed with KODAK Galvez       -Rectal tube in place  -De La Garza catheter in place  -Redness and excoriation to perineum and groin area- barrier paste applied  -Pressure ulcer to sacrum, bleeding, macerated, non blanchable erythema, with large amounts of yellow/green foul smelling purulent drainage. Site cleansed with NS and redressed with hydrofera silver and mepilex dressing. Turning and repositioning q2hrs and PRN.   -abd midline ex lap site, CDI. Closed with steristrips, JONATHAN.   -Blanchable redness to left elbow, ppx mepilex to charly elbows  -Bilateral elbows elevated on pillows  -Lips scabbed, bleeding at times- lip balm and Vaseline applied   -Heel protectors in place  -Blanchable redness to right medial ankle/foreshin area  -Redness to ambominal skin fold- interdry in place  -Patient on bariatric low air loss mattress     Patient is comfort measures all measures are made to keep patient comfortable while still encouraging mobilization and dressing changes

## 2020-04-12 NOTE — DISCHARGE SUMMARY
Death Summary    Cause of Death  Adenocarcinoma of the rectum    Comorbid Conditions at the Time of Death  1. Stage IIa rectal adenocarcinoma with metastasis s/p chemo and radiation  2. Cancer related pain  3. Small bowel obstruction s/p ex lab with resection 3/21/20  4. Anemia  5. Severe protein-calorie malnutrition  6. Thrombocytopenia  7. Hypokalemia  8. Pneumothorax    History of Presenting Illness and Hospital Course  Ms Elle Barksdale is an 80 year old female with rectal adenocarcinoma. Prior functional status was ambulatory receiving chemo & radiation. Current functional status is PPS 20%.  Patient was recently hospitalized 3/21/20 at [facility] for abdominal pain, distention, intractable nausea and vomiting. CT findings were consistent with obstruction. The patient underwent a laparotomy and lysis of adhesions and small bowel resection on that day. Since then, she has also developed a decubitus and has had severe diarrhea.  The diarrhea has not resolved with medications & currently has a rectal tube. C-diff negative.  Pt is no longer eating, & only taking sips of PO fluids. Patient has generalized edema with 2+ in lower extremities.  Daughter, Nerissa, has noticed an increase in pain from yesterday. This morning Elle was crying in pain after IV dose of morphine, then she got an oral dose of morphine, which was ineffective.  Pt then received another dose of IV morphine with some pain relief results. Nerissa & staff have noticed patient having a lot of pain when she is being turned in bed.         Patient's daughter elected Hospice and patient was admitted for [facility] hospice. She was started on a morphine infusion which was titrated for comfort. She passed away on 4/9/2020.    Death Date: 04/09/20   Death Time: 2040       Pronounced By (RN1): Yumiko Levin  Pronounced By (RN2): Fede Fowler     04-Feb-2019 18:50

## 2020-05-05 NOTE — DISCHARGE SUMMARY
Discharge Summary    CHIEF COMPLAINT ON ADMISSION  Chief Complaint   Patient presents with   • Shortness of Breath   • Abdominal Pain   • Unresponsive       Reason for Admission  EMS     Admission Date  3/21/2020    CODE STATUS  Comfort Care/DNR    HPI & HOSPITAL COURSE  This is a 80 y.o. female here with below medical issues.     80 y.o. female with a h/o rectal cancer s/p chemoradiation who presented 3/21/2020 with worsening abdominal pain.  CT of the abdomen and pelvis showed small bowel obstruction with pneumatosis involving dilated loops.   She was admitted to the ICU and started on pressors.  She underwent exploratory laparotomy and small bowel resection on 3/21/2020 with Dr. Interiano.  She was extubated on 3/22/20.  She had positive blood and urine cultures for E. Coli, pansensitive.  She was given zosyn x 8 days.     Patient then transferred to surgical floor, surgery continue to follow the patient.  She is noted to have increased amount of output from her rectal tube, C. difficile study has been checked and noted to be negative this is started on Imodium as per Dr. Interiano recommendation.      Chest X-ray  Initially showed Pneumothorax on 4/2/2020 but on 4/3/2020--> no pneumo     I went to bedside and discussed the CODE STATUS of the patient on 4/3/2020---> she expressed her wish to be DNR.  Palliative came and evaluated the patient, patient stated that he wanted to become comfortable, and she wanted to go home and meet her family. Thus comfort care orders has been initiated. Cortrak has been removed and patient will be provided whatever she would like to eat.     Patient is noted to be comfortable and all measure to make patient comfortable has been in place. Patient/family choose [facility] and patient will be likely going to Group home on home hospice.    She has been having lost of loose bowel movement and does have rectal tube for comfort.    She was transitioned to full comfort care and placed on  inpatient comfort care. She was transferred general inpatient hospice under their care.        Therefore, she is discharged in guarded and stable condition to hospice.    The patient met 2-midnight criteria for an inpatient stay at the time of discharge.    Discharge Date  4/8/2020    FOLLOW UP ITEMS POST DISCHARGE  F/U with hospice physicians    DISCHARGE DIAGNOSES  Principal Problem:    Comfort measures only status POA: Yes  Active Problems:    Rectal cancer (HCC) POA: Yes      Overview: IMO load March 2020    Perforation of intestine (HCC) POA: Yes    Bacteremia due to Escherichia coli POA: Yes    Hypernatremia POA: Yes    Septic shock (HCC) POA: Yes    Hypokalemia POA: Yes    Thrombocytopenia (HCC) POA: Yes    Urinary retention POA: Yes      Overview: IMO load March 2020    Severe protein-calorie malnutrition (HCC) POA: Yes    Anemia POA: Yes    Hypophosphatemia POA: Yes    Pancytopenia (HCC) POA: Yes    Sacral wound POA: Yes    Pneumothorax POA: Yes    Acute kidney injury (HCC) POA: Yes    Respiratory failure following trauma and surgery (HCC) POA: Yes  Resolved Problems:    Acute cystitis without hematuria POA: Yes      FOLLOW UP  No future appointments.  Pcp Pt States None          Sarah Hare M.D.  [address]  [address]  [facility] [id number] [phone]            MEDICATIONS ON DISCHARGE     Medication List      ASK your doctor about these medications      Instructions   acetaminophen 325 MG Tabs  Commonly known as:  TYLENOL   Take 650 mg by mouth every four hours as needed.  Dose:  650 mg            Allergies  No Known Allergies    DIET  As tolerated    ACTIVITY  As tolerated.  Weight bearing as tolerated    CONSULTATIONS  PC  Surgery    PROCEDURES  As noted above    DX-CHEST-LIMITED (1 VIEW)   Final Result      No pneumothorax is identified on the current exam.      DX-CHEST-LIMITED (1 VIEW)   Final Result      1.  Again seen lucency within the right lung base possibly representing a minimal basilar  pneumothorax versus linear atelectasis. This is unchanged.      2.  Stable cardiomegaly.      US-EXTREMITY VENOUS LOWER BILAT   Final Result      DX-CHEST-LIMITED (1 VIEW)   Final Result      Increased right basilar atelectasis.      Small right pneumothorax appears to be present. Follow-up in 2 hours is recommended.      Blunting of the costophrenic angles may be related to trace pleural effusions.      Atherosclerotic plaque.      Findings called to the covering clinician.            DX-ABDOMEN FOR TUBE PLACEMENT   Final Result      Enteric feeding tube tip terminates over the expected region of the first portion of the duodenum.      DX-ABDOMEN FOR TUBE PLACEMENT   Final Result         Feeding tube with tip projecting over the expected area of the second portion duodenum.      [id number] VIEW   Final Result      NG tube tip overlies the gastric body.      DX-CHEST-PORTABLE (1 VIEW)   Final Result      1.  Interval extubation.   2.  Stable mild left basilar atelectasis and/or consolidation.      DX-CHEST-PORTABLE (1 VIEW)   Final Result         1. Interval intubation, with well-positioned lines and tubes.   2. Mild left basilar atelectasis. No new consolidation or significant pleural effusions.         CT-ABDOMEN-PELVIS W/O   Final Result      1.  Dilated small intestine with decompression distally and decompression of the colon consistent with small bowel obstruction. There is pneumatosis involving dilated loops of small intestine consistent with bowel necrosis/ischemia.      2.  Small amount of ascites.      3.  Right lung base atelectasis/consolidation and small right pleural effusion.      This was discussed with ALIYAH TOSCANO at below the 6:00 AM on 3/21/2020.      DX-CHEST-PORTABLE (1 VIEW)   Final Result      1.  Interval placement of a right IJ central line without evidence of complication.      2.  Cardiomegaly.      DX-CHEST-PORTABLE (1 VIEW)   Final Result      Borderline cardiomegaly.             LABORATORY  Lab Results   Component Value Date    SODIUM 142 04/03/2020    POTASSIUM 3.9 04/03/2020    CHLORIDE 115 (H) 04/03/2020    CO2 21 04/03/2020    GLUCOSE 115 (H) 04/03/2020    BUN 20 04/03/2020    CREATININE 0.34 (L) 04/03/2020        Lab Results   Component Value Date    WBC 2.3 (LL) 04/03/2020    HEMOGLOBIN 7.2 (L) 04/03/2020    HEMATOCRIT 23.7 (L) 04/03/2020    PLATELETCT 160 (L) 04/03/2020        Total time of the discharge process exceeds 43 minutes.

## 2022-09-12 NOTE — PROGRESS NOTES
Hospital Medicine Daily Progress Note    Date of Service  4/7/2020    Chief Complaint  80 y.o. female admitted 3/21/2020 with abdominal pain    Hospital Course    80 y.o. female with a h/o rectal cancer s/p chemoradiation who presented 3/21/2020 with worsening abdominal pain.  CT of the abdomen and pelvis showed small bowel obstruction with pneumatosis involving dilated loops.   She was admitted to the ICU and started on pressors.  She underwent exploratory laparotomy and small bowel resection on 3/21/2020 with Dr. Interiano.  She was extubated on 3/22/20.  She had positive blood and urine cultures for E. Coli, pansensitive.  She was given zosyn x 8 days.     Patient then transferred to surgical floor, surgery continue to follow the patient.  She is noted to have increased amount of output from her rectal tube, C. difficile study has been checked and noted to be negative this is started on Imodium as per Dr. Interiano recommendation.      Chest X-ray  Initially showed Pneumothorax on 4/2/2020 but on 4/3/2020--> no pneumo     I went to bedside and discussed the CODE STATUS of the patient on 4/3/2020---> she expressed her wish to be DNR.  Palliative came and evaluated the patient, patient stated that he wanted to become comfortable, and she wanted to go home and meet her family. Thus comfort care orders has been initiated. Cortrak has been removed and patient will be provided whatever she would like to eat.     Patient is noted to be comfortable and all measure to make patient comfortable has been in place. Patient/family choose [facility] and patient will be likely going to Group home on home hospice.    She has been having lost of loose bowel movement and does have rectal tube for comfort          Interval Problem Update  Comfort care-patient is comfortable denies any change in pain medications today.  Still working on disposition      Consultants/Specialty  Internal Medicine  Surgery (primary)  Oncology     Code  Status  FULL    Disposition  Working on group home for hospice    Review of Systems  Review of Systems   Constitutional: Positive for malaise/fatigue. Negative for fever.        She remains about the same   HENT: Negative for hearing loss.         Dry lips/mouth   Eyes: Negative for blurred vision and photophobia.   Respiratory: Negative for cough.    Cardiovascular: Positive for leg swelling. Negative for chest pain.   Gastrointestinal: Negative for abdominal pain, diarrhea, nausea and vomiting.   Genitourinary: Negative for hematuria.   Musculoskeletal: Positive for myalgias. Negative for neck pain.        Pain levels are well controlled   Neurological: Positive for weakness. Negative for dizziness.   Endo/Heme/Allergies: Does not bruise/bleed easily.   Psychiatric/Behavioral: Negative for substance abuse.   All other systems reviewed and are negative.       Physical Exam  Temp:  [36.7 °C (98.1 °F)-37.5 °C (99.5 °F)] 37.1 °C (98.7 °F)  Pulse:  [] 109  Resp:  [16-19] 19  BP: (104-124)/(65-70) 104/65  SpO2:  [92 %-94 %] 94 %    Physical Exam  Nursing note reviewed.   Constitutional:       General: She is not in acute distress.     Appearance: Normal appearance. She is ill-appearing.      Comments: Appears comfortable   HENT:      Head: Normocephalic and atraumatic.      Mouth/Throat:      Mouth: Mucous membranes are dry.   Eyes:      General: No scleral icterus.  Neck:      Comments: Central line RIJ c/d/i, unchanged today  Cardiovascular:      Rate and Rhythm: Normal rate and regular rhythm.   Pulmonary:      Comments: Auscultated from front,  CTAB  Abdominal:      General: Bowel sounds are normal. There is no distension.      Palpations: Abdomen is soft.      Tenderness: There is abdominal tenderness.      Comments: Abdominal incision with suture clean/dry and intact   Musculoskeletal:         General: Swelling present.      Comments: anasarca   Neurological:      Mental Status: She is alert.      Motor: No  weakness.           Fluids    Intake/Output Summary (Last 24 hours) at 4/7/2020 1031  Last data filed at 4/7/2020 0439  Gross per 24 hour   Intake 560 ml   Output 800 ml   Net -240 ml       Laboratory                        Imaging  DX-CHEST-LIMITED (1 VIEW)   Final Result      No pneumothorax is identified on the current exam.      DX-CHEST-LIMITED (1 VIEW)   Final Result      1.  Again seen lucency within the right lung base possibly representing a minimal basilar pneumothorax versus linear atelectasis. This is unchanged.      2.  Stable cardiomegaly.      US-EXTREMITY VENOUS LOWER BILAT   Final Result      DX-CHEST-LIMITED (1 VIEW)   Final Result      Increased right basilar atelectasis.      Small right pneumothorax appears to be present. Follow-up in 2 hours is recommended.      Blunting of the costophrenic angles may be related to trace pleural effusions.      Atherosclerotic plaque.      Findings called to the covering clinician.            DX-ABDOMEN FOR TUBE PLACEMENT   Final Result      Enteric feeding tube tip terminates over the expected region of the first portion of the duodenum.      DX-ABDOMEN FOR TUBE PLACEMENT   Final Result         Feeding tube with tip projecting over the expected area of the second portion duodenum.      [id number] VIEW   Final Result      NG tube tip overlies the gastric body.      DX-CHEST-PORTABLE (1 VIEW)   Final Result      1.  Interval extubation.   2.  Stable mild left basilar atelectasis and/or consolidation.      DX-CHEST-PORTABLE (1 VIEW)   Final Result         1. Interval intubation, with well-positioned lines and tubes.   2. Mild left basilar atelectasis. No new consolidation or significant pleural effusions.         CT-ABDOMEN-PELVIS W/O   Final Result      1.  Dilated small intestine with decompression distally and decompression of the colon consistent with small bowel obstruction. There is pneumatosis involving dilated loops of small intestine consistent with  bowel necrosis/ischemia.      2.  Small amount of ascites.      3.  Right lung base atelectasis/consolidation and small right pleural effusion.      This was discussed with ALIYAH TOSCANO at below the 6:00 AM on 3/21/2020.      DX-CHEST-PORTABLE (1 VIEW)   Final Result      1.  Interval placement of a right IJ central line without evidence of complication.      2.  Cardiomegaly.      DX-CHEST-PORTABLE (1 VIEW)   Final Result      Borderline cardiomegaly.           Assessment/Plan  * Comfort measures only status- (present on admission)  Assessment & Plan  After an extensive discussion with the patient/ family by Palliative team----> patient/family decided to go for comfort measure. Palliative/Hospice team following. Likely going to Group home on [facility]  Comfort measure will be provided.        Hypernatremia- (present on admission)  Assessment & Plan   Resolved , on comfort measure    Bacteremia due to Escherichia coli- (present on admission)  Assessment & Plan  S/p 7 days zosyn  Source: Likely Urinary tract infection      Perforation of intestine (HCC)- (present on admission)  Assessment & Plan  - CT scan of abdominal pelvis suggested bowel obstruction with pneumatosis involving the small bowels concerning for ischemia or necrosis.    - S/p ex lap with small bowel resection 3/21/20 with Dr. Alfred   ----> Noted to have large output from rectal tube , thus c-dif study was obtained which has been -ve .   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      Now on comfort measure ,  Likely going to group home on [facility], working with case management on this issue  -Continue multimodal pain therapy with IV morphine oral  Roxanol and IV Ativan, no much use in the last 24 hours but adjust as needed      Anemia- (present on admission)  Assessment & Plan  Likely in setting of surgery, infection  -Comfort care stop checking labs    Severe protein-calorie malnutrition (HCC)- (present on admission)  Assessment & Plan  Cortrak removed since patient is on comfort measure    Urinary retention- (present on admission)  Assessment & Plan  [place] in place    Thrombocytopenia (HCC)- (present on admission)  Assessment & Plan  I likely 2/2 sepsis, not actvely bleeding    No lab draw  , patient is on comfort measure    Hypokalemia- (present on admission)  Assessment & Plan  On comfort measure    Septic shock (HCC)- (present on admission)  Assessment & Plan  -Resolved    S/p IV Zosyn for 7 days for E-coli bacteremia  Urine cx growing E-coli   Culture from bowel surgery NGTD    Now on comfort measure , going to [facility]      Pneumothorax- (present on admission)  Assessment & Plan  Patient is noted to be short of breath, chest x-ray ordered---> noted to have right basilar pneumothorax.  Chest x-ray repeated after 12 hours--->  Noted to have stable pneumothorax. Will make sure patient oxygenate 100% .     ---> repeat chest X-ray showed  Resolution of pneumothorax    Sacral wound- (present on admission)  Assessment & Plan  Large sacral wound  Wound following, rectal tube  For comfort as patient is noted to have e lots of BM   Q 2 turn, now on comfort care    Pancytopenia (HCC)- (present on admission)  Assessment & Plan  Likely in setting of acute severe illness    Respiratory failure following trauma and surgery (HCC)- (present on admission)  Assessment & Plan  On  2 L of O2    Acute kidney injury (HCC)- (present on admission)  Assessment & Plan  Resolved, comfort care       VTE prophylaxis: Lovenox         [Normal] : Developmental history within normal limits [Roll Over: ___ Months] : Roll Over: [unfilled] months [Sit Up: ___ Months] : Sit Up: [unfilled] months [Pull Self to Stand ___ Months] : Pull self to stand: [unfilled] months [Walk ___ Months] : Walk: [unfilled] months [Verbally] : verbally [Right] : right [FreeTextEntry2] : none [FreeTextEntry3] : none

## 2022-12-05 NOTE — WOUND TEAM
Checked in on patient.  Rectal tube working.  Dressing from yesterday still intact.  PRADIP mattress in place.  Will see patient tomorrow.    Detail Level: Detailed

## 2023-06-20 NOTE — CARE PLAN
Problem: Nutritional:  Goal: Achieve adequate nutritional intake  Description: Start PO diet as appropriate  When diet begins patient will consume 50% of meals and supplements   Outcome: NOT MET      This document is complete and the patient is ready for discharge.

## 2025-03-26 NOTE — ASSESSMENT & PLAN NOTE
Albumin low - 1.5  3/27 Failed swallow - Place cortrak, started trickle feeds  3/28 Adv TF     Offered staggered corticosteroid injections for the knees due to elevated A1c  Lab Results   Component Value Date    HGBA1C 7.7 (H) 03/26/2025

## (undated) DEVICE — NEPTUNE 4 PORT MANIFOLD - (20/PK)

## (undated) DEVICE — SODIUM CHL IRRIGATION 0.9% 1000ML (12EA/CA)

## (undated) DEVICE — CORETEMP DRAPE, FORM-FITTED, EASY DROPANDGO DRAPE FOR USE ON THE CORETEMP FLUID MANAGEMENT 56 X 56""

## (undated) DEVICE — DRESSING LEUKOMED STERILE 11.75X4IN - (50/CA)

## (undated) DEVICE — SENSOR SPO2 NEO LNCS ADHESIVE (20/BX) SEE USER NOTES

## (undated) DEVICE — SUCTION INSTRUMENT YANKAUER BULBOUS TIP W/O VENT (50EA/CA)

## (undated) DEVICE — SUTURE 1 VICRYL PLUS CT-1 - 18 INCH (12/BX)

## (undated) DEVICE — STAPLER 60MM OPEN GREEN 4.8 - W/STAPLE (3/BX)

## (undated) DEVICE — TOWELS CLOTH SURGICAL - (4/PK 20PK/CA)

## (undated) DEVICE — ELECTRODE 850 FOAM ADHESIVE - HYDROGEL RADIOTRNSPRNT (50/PK)

## (undated) DEVICE — PACK MAJOR BASIN - (2EA/CA)

## (undated) DEVICE — ELECTRODE DUAL RETURN W/ CORD - (50/PK)

## (undated) DEVICE — DETERGENT RENUZYME PLUS 10 OZ PACKET (50/BX)

## (undated) DEVICE — STAPLER SKIN DISP - (6/BX 10BX/CA) VISISTAT

## (undated) DEVICE — GOWN SURGEONS X-LARGE - DISP. (30/CA)

## (undated) DEVICE — SUTURE 3-0 VICRYL PLUS SH - 8X 18 INCH (12/BX)

## (undated) DEVICE — TUBING CLEARLINK DUO-VENT - C-FLO (48EA/CA)

## (undated) DEVICE — MASK ANESTHESIA ADULT  - (100/CA)

## (undated) DEVICE — LACTATED RINGERS INJ 1000 ML - (14EA/CA 60CA/PF)

## (undated) DEVICE — DRAPE MAYO STAND - (30/CA)

## (undated) DEVICE — PROTECTOR ULNA NERVE - (36PR/CA)

## (undated) DEVICE — SUTURE GENERAL

## (undated) DEVICE — BAG, SPONGE COUNT 50600

## (undated) DEVICE — STAPLE 75MM LINEAR (12EA/BX)

## (undated) DEVICE — SET LEADWIRE 5 LEAD BEDSIDE DISPOSABLE ECG (1SET OF 5/EA)

## (undated) DEVICE — SUTURE 0 COATED VICRYL 6-18IN - (12PK/BX)

## (undated) DEVICE — SUTURE 3-0 SILK SH C/R 18 IN - (12/BX)

## (undated) DEVICE — CHLORAPREP 26 ML APPLICATOR - ORANGE TINT(25/CA)

## (undated) DEVICE — BOVIE  BLADE 6 EXTENDED - (50/PK)

## (undated) DEVICE — SUTURE 3-0 VICRYL PLUS SH - 27 INCH (36/BX)

## (undated) DEVICE — GLOVE BIOGEL PI INDICATOR SZ 7.0 SURGICAL PF LF - (50/BX 4BX/CA)

## (undated) DEVICE — SET EXTENSION WITH 2 PORTS (48EA/CA) ***PART #2C8610 IS A SUBSTITUTE*****

## (undated) DEVICE — TUBE CONNECT SUCTION CLEAR 120 X 1/4" (50EA/CA)"

## (undated) DEVICE — PAD LAP STERILE 18 X 18 - (5/PK 40PK/CA)

## (undated) DEVICE — HEAD HOLDER JUNIOR/ADULT

## (undated) DEVICE — STAPLE 60MM GREEN 4.8MM (12EA/BX)

## (undated) DEVICE — GOWN SURGICAL X-LARGE ULTRA - FILM-REINFORCED (20/CA)

## (undated) DEVICE — CANISTER SUCTION 3000ML MECHANICAL FILTER AUTO SHUTOFF MEDI-VAC NONSTERILE LF DISP  (40EA/CA)

## (undated) DEVICE — SUTURE 2-0 COATED VICRYL PLUS - 12 X 18 INCH (12/BX)

## (undated) DEVICE — DRAPE LARGE 3 QUARTER - (20/CA)

## (undated) DEVICE — DRAPE LAPAROTOMY T SHEET - (12EA/CA)

## (undated) DEVICE — STAPLER 75MM LINEAR OPEN (3EA/BX)

## (undated) DEVICE — GLOVE SZ 6.5 BIOGEL PI MICRO - PF LF (50PR/BX)

## (undated) DEVICE — SUTURE 2-0 VICRYL PLUS SH - 8 X 18 INCH (12/BX)

## (undated) DEVICE — KIT ROOM DECONTAMINATION

## (undated) DEVICE — KIT ANESTHESIA W/CIRCUIT & 3/LT BAG W/FILTER (20EA/CA)